# Patient Record
Sex: FEMALE | Race: WHITE | NOT HISPANIC OR LATINO | Employment: OTHER | URBAN - METROPOLITAN AREA
[De-identification: names, ages, dates, MRNs, and addresses within clinical notes are randomized per-mention and may not be internally consistent; named-entity substitution may affect disease eponyms.]

---

## 2017-01-11 ENCOUNTER — TELEPHONE (OUTPATIENT)
Dept: NEUROSURGERY | Facility: CLINIC | Age: 62
End: 2017-01-11

## 2017-01-11 DIAGNOSIS — Z98.1 STATUS POST LUMBAR SPINAL FUSION: Primary | ICD-10-CM

## 2017-01-12 RX ORDER — GABAPENTIN 300 MG/1
300 CAPSULE ORAL 3 TIMES DAILY
Qty: 90 CAPSULE | Refills: 3 | Status: SHIPPED | OUTPATIENT
Start: 2017-01-12 | End: 2017-07-13

## 2017-01-23 ENCOUNTER — HOSPITAL ENCOUNTER (OUTPATIENT)
Dept: GENERAL RADIOLOGY | Facility: CLINIC | Age: 62
Discharge: HOME | End: 2017-01-23
Attending: NURSE PRACTITIONER | Admitting: NURSE PRACTITIONER
Payer: COMMERCIAL

## 2017-01-23 DIAGNOSIS — Z98.1 STATUS POST LUMBAR SPINAL FUSION: ICD-10-CM

## 2017-01-23 PROCEDURE — 72100 X-RAY EXAM L-S SPINE 2/3 VWS: CPT

## 2017-01-30 ENCOUNTER — OFFICE VISIT (OUTPATIENT)
Dept: NEUROSURGERY | Facility: CLINIC | Age: 62
End: 2017-01-30
Attending: NURSE PRACTITIONER
Payer: COMMERCIAL

## 2017-01-30 VITALS
WEIGHT: 179 LBS | SYSTOLIC BLOOD PRESSURE: 142 MMHG | HEART RATE: 68 BPM | TEMPERATURE: 98 F | BODY MASS INDEX: 31.72 KG/M2 | DIASTOLIC BLOOD PRESSURE: 78 MMHG | OXYGEN SATURATION: 94 %

## 2017-01-30 DIAGNOSIS — Z98.1 STATUS POST LUMBAR SPINAL FUSION: Primary | ICD-10-CM

## 2017-01-30 PROCEDURE — 99214 OFFICE O/P EST MOD 30 MIN: CPT | Performed by: NURSE PRACTITIONER

## 2017-01-30 PROCEDURE — 99211 OFF/OP EST MAY X REQ PHY/QHP: CPT | Performed by: NURSE PRACTITIONER

## 2017-01-30 ASSESSMENT — PAIN SCALES - GENERAL: PAINLEVEL: MILD PAIN (3)

## 2017-01-30 NOTE — NURSING NOTE
"Mckayla Grady is a 61 year old female who presents for:  Chief Complaint   Patient presents with     Neurologic Problem     6 month follow upstatus post lumbar fusion DOS 8/4/16, still having the left leg numbing and stabbing in the ankle         Initial Vitals:  Wt 179 lb (81.194 kg)  LMP 05/21/2002 Estimated body mass index is 31.72 kg/(m^2) as calculated from the following:    Height as of 10/26/16: 5' 3\" (1.6 m).    Weight as of this encounter: 179 lb (81.194 kg).. There is no height on file to calculate BSA. BP completed using cuff size: large  Mild Pain (3)    Do you feel safe in your environment?  Yes  Do you need any refills today? No    Nursing Comments: 6 month follow upstatus post lumbar fusion DOS 8/4/16, still having the left leg numbing and stabbing in the ankle.  Patient rates her pain today as 3      5 min. nursing intake time  Licha Barros MA       Discharge plan: - Increase activity using pain as your guide  - followup in 6 months with xray prior   - Call the clinic at [phone] for increased pain or any other questions and concerns.  2 min. nursing discharge time  Licha Barros MA          "

## 2017-01-30 NOTE — MR AVS SNAPSHOT
"              After Visit Summary   1/30/2017    Mckayla Grady    MRN: [id number]           Patient Information     Date Of Birth          1955        Visit Information        Provider Department      1/30/2017 2:00 PM Carmen Cerda APRN CNP [facility]        Today's Diagnoses     Status post lumbar spinal fusion    -  1       Care Instructions    Plan:     - Increase activity using pain as your guide  - followup in 6 months with xray prior   - Call the clinic at [phone] for increased pain or any other questions and concerns.            Follow-ups after your visit        Future tests that were ordered for you today     Open Future Orders        Priority Expected Expires Ordered    XR Lumbar Spine 2/3 Views Routine 7/28/2017 1/30/2018 1/30/2017            Who to contact     If you have questions or need follow up information about today's clinic visit or your schedule please contact [facility] directly at [phone].  Normal or non-critical lab and imaging results will be communicated to you by [employer], letter or phone within 4 business days after the clinic has received the results. If you do not hear from us within 7 days, please contact the clinic through [employer] or phone. If you have a critical or abnormal lab result, we will notify you by phone as soon as possible.  Submit refill requests through [employer] or call your pharmacy and they will forward the refill request to us. Please allow 3 business days for your refill to be completed.          Additional Information About Your Visit        [employer] Information     [employer] lets you send messages to your doctor, view your test results, renew your prescriptions, schedule appointments and more. To sign up, go to [email or web address] . Click on \"Log in\" on the left side of the screen, which will take you to the Welcome page. Then click on \"Sign up Now\" on the right side of the page.     You will be asked " to enter the access code listed below, as well as some personal information. Please follow the directions to create your username and password.     Your access code is: [email or web address]  Expires: 2017  2:14 PM     Your access code will  in 90 days. If you need help or a new code, please call your [facility] clinic or [phone].        Care EveryWhere ID     This is your Care EveryWhere ID. This could be used by other organizations to access your [facility] medical records  [id number]        Your Vitals Were     Pulse Temperature Pulse Oximetry Last Period           98  F (36.7  C) (Oral) 94% 2002         Blood Pressure from Last 3 Encounters:   17 142/78   10/26/16 128/83   16 121/68    Weight from Last 3 Encounters:   17 179 lb (81.194 kg)   10/26/16 170 lb 3.2 oz (77.202 kg)   16 172 lb (78.019 kg)               Primary Care Provider Office Phone # Fax #    Lucille Garcia PA-C [phone]       [facility] [place] [address]  [place] [address]        Thank you!     Thank you for choosing [facility]  for your care. Our goal is always to provide you with excellent care. Hearing back from our patients is one way we can continue to improve our services. Please take a few minutes to complete the written survey that you may receive in the mail after your visit with us. Thank you!             Your Updated Medication List - Protect others around you: Learn how to safely use, store and throw away your medicines at www.disposemymeds.org.          This list is accurate as of: 17  2:14 PM.  Always use your most recent med list.                   Brand Name Dispense Instructions for use    gabapentin 300 MG capsule    NEURONTIN    90 capsule    Take 1 capsule (300 mg) by mouth 3 times daily       omeprazole 20 MG tablet    priLOSEC OTC     Take 20 mg by mouth as needed       oxyCODONE 5 MG IR tablet    ROXICODONE     60 tablet    Take 1-2 tablets (5-10 mg) by mouth every 4 hours as needed for moderate to severe pain       TRAZODONE HCL PO

## 2017-01-30 NOTE — PATIENT INSTRUCTIONS
Plan:     - Increase activity using pain as your guide  - followup in 6 months with xray prior   - Call the clinic at [phone] for increased pain or any other questions and concerns.

## 2017-01-30 NOTE — PROGRESS NOTES
[facility]  Neurosurgery followup:    HPI: Mckayla is a 62yo who is s/p L4-S1 decompression; TLIF  with Dr. Smith on 8/4/16 .  She is 6 months post op.  She continues to note some numbness to her left LE. Post surgery she had a lot of pain. She feels that when she does more activity the pain is worse.  She notes increased symptoms with walking more than 25 minutes makes it worse.  Overall, she is better. She was educated that this may or may not fully resolve and it is recommended that she return to PT.     Exam:  Constitutional:  Alert, well nourished, NAD.  HEENT: Normocephalic, atraumatic.   Pulm:  Without shortness of breath   CV:  No pitting edema of BLE.      Neurological:  Awake  Alert  Oriented x 3  Motor exam:        IP Q DF PF EHL  R   5  5   5   5    5  L   5  5   5   5    5     Able to spontaneously move L/E bilaterally  Sensation intact throughout all L/E dermatomes     Imaging: lumbar xray shows fusion intact  A/P: Mckayla is a 62yo who is s/p L4-S1 decompression; TLIF  with Dr. Smiht on 8/4/16 .  She is 6 months post op.  She continues to note some numbness to her left LE. Post surgery she had a lot of pain. She feels that when she does more activity the pain is worse.  She notes increased symptoms with walking more than 25 minutes makes it worse.  Overall, she is better. She was educated that this may or may not fully resolve and it is recommended that she return to PT.   She noted that when she ran out of the Neurontin she had increased pain. She takes 2 at bedtime. It was explained that it is ok to continue this.      Plan:     - Increase activity using pain as your guide  - followup in 6 months with xray prior   - Call the clinic at [phone] for increased pain or any other questions and concerns.      Carmen Cerda [facility]  [facility]  [address]  [address] [place] [address]    Tel [phone]  Pager [phone]

## 2017-05-19 ENCOUNTER — OFFICE VISIT (OUTPATIENT)
Dept: FAMILY MEDICINE | Facility: CLINIC | Age: 62
End: 2017-05-19
Payer: COMMERCIAL

## 2017-05-19 VITALS
OXYGEN SATURATION: 98 % | DIASTOLIC BLOOD PRESSURE: 84 MMHG | TEMPERATURE: 98.3 F | HEART RATE: 55 BPM | SYSTOLIC BLOOD PRESSURE: 155 MMHG | HEIGHT: 67 IN | WEIGHT: 179 LBS | BODY MASS INDEX: 28.09 KG/M2

## 2017-05-19 DIAGNOSIS — L91.8 SKIN TAG: ICD-10-CM

## 2017-05-19 DIAGNOSIS — Z11.59 NEED FOR HEPATITIS C SCREENING TEST: ICD-10-CM

## 2017-05-19 DIAGNOSIS — R03.0 ELEVATED BLOOD PRESSURE READING WITHOUT DIAGNOSIS OF HYPERTENSION: ICD-10-CM

## 2017-05-19 DIAGNOSIS — Z02.89 HEALTH EXAMINATION OF DEFINED SUBPOPULATION: Primary | ICD-10-CM

## 2017-05-19 DIAGNOSIS — Z12.11 COLON CANCER SCREENING: ICD-10-CM

## 2017-05-19 DIAGNOSIS — Z80.0 FAMILY HISTORY OF COLON CANCER: ICD-10-CM

## 2017-05-19 DIAGNOSIS — Z12.31 ENCOUNTER FOR SCREENING MAMMOGRAM FOR BREAST CANCER: ICD-10-CM

## 2017-05-19 DIAGNOSIS — K63.5 COLON POLYPS: ICD-10-CM

## 2017-05-19 PROCEDURE — 99213 OFFICE O/P EST LOW 20 MIN: CPT | Mod: 25 | Performed by: PHYSICIAN ASSISTANT

## 2017-05-19 PROCEDURE — 86762 RUBELLA ANTIBODY: CPT | Performed by: PHYSICIAN ASSISTANT

## 2017-05-19 PROCEDURE — 11200 RMVL SKIN TAGS UP TO&INC 15: CPT | Performed by: PHYSICIAN ASSISTANT

## 2017-05-19 PROCEDURE — 36415 COLL VENOUS BLD VENIPUNCTURE: CPT | Performed by: PHYSICIAN ASSISTANT

## 2017-05-19 PROCEDURE — 86765 RUBEOLA ANTIBODY: CPT | Performed by: PHYSICIAN ASSISTANT

## 2017-05-19 PROCEDURE — 86735 MUMPS ANTIBODY: CPT | Performed by: PHYSICIAN ASSISTANT

## 2017-05-19 PROCEDURE — 86803 HEPATITIS C AB TEST: CPT | Performed by: PHYSICIAN ASSISTANT

## 2017-05-19 PROCEDURE — 86787 VARICELLA-ZOSTER ANTIBODY: CPT | Performed by: PHYSICIAN ASSISTANT

## 2017-05-19 NOTE — LETTER
[address] [place] [address]   [phone]      May 22, 2017    Mckayla Grady  [address] [facility] [address]              Jos Block,    I have reviewed your recent labs. Here are the results:    -Hepatitis C antibody screen test shows no signs of a previous hepatitis C infection.    -Labs show you ARE immune to - Varicella, mumps, measles (rubeloa), rubella.     If you have any questions please do not hesitate to contact our office via phone ([phone]) or [employer]zzui by clicking the contact my Care Team link.    If you have further questions about the interpretation of your lab results, [email or web address] is a great website to check out.    Thank you for allowing me to participate in your care!    YESSI Lynn, PA-C  [facility]

## 2017-05-19 NOTE — PROGRESS NOTES
SUBJECTIVE:                                                    Mckayla Grady is a 61 year old female who presents to clinic today for the following health issues:      HPI:  1- Mckayla needs titers for varicella. Was told d/t her age she doesn't need MMR titers, but she would like this run. UTD on tetanus.   2- skin tag right arm pit, gets irritated, would like removed.    3- Due for mammo and colonoscopy.     -------------------------------------    Problem list and histories reviewed & adjusted, as indicated.  Additional history: as documented    Patient Active Problem List   Diagnosis     Leiomyoma of uterus     Calculus of kidney     Esophageal reflux     Family history of colon cancer     Colon polyps     CARDIOVASCULAR SCREENING; LDL GOAL LESS THAN 160     Advanced directives, counseling/discussion     Hip pain     DDD (degenerative disc disease), lumbar     Lumbar disc herniation     Lumbar foraminal stenosis     Central spinal stenosis     Overweight     S/P complete hysterectomy     Anxiety state     Dense breasts     Left breast mass     Lumbago     Aftercare following surgery of the musculoskeletal system     Elevated TSH     S/P lumbar fusion     Chronic bilateral low back pain with left-sided sciatica     Elevated blood pressure reading without diagnosis of hypertension     Past Surgical History:   Procedure Laterality Date     APPENDECTOMY       C EXPLORE/TREAT ANKLE JOINT  1989     C TOTAL ABDOM HYSTERECTOMY  2002    with bilat ovary removal     COLONOSCOPY  4/2009     COLONOSCOPY  4/2012    Repeat in 5 years     DISCECTOMY LUMBAR POSTERIOR MICROSCOPIC ONE LEVEL Right 1/8/2015    Procedure: DISCECTOMY LUMBAR POSTERIOR MICROSCOPIC ONE LEVEL;  Surgeon: Román Smith MD;  Location: [facility] OR     DISKECTOMY, LUMBAR, SINGLE SP  2001    X 2     DISKECTOMY, LUMBAR, SINGLE SP  2006      HC REVISE MEDIAN N/CARPAL TUNNEL SURG  2002     HYSTERECTOMY, PAP NO LONGER INDICATED       OPTICAL TRACKING SYSTEM  FUSION SPINE POSTERIOR LUMBAR TWO LEVELS N/A 8/4/2016    Procedure: OPTICAL TRACKING SYSTEM FUSION SPINE POSTERIOR LUMBAR TWO LEVELS;  Surgeon: Román Smith MD;  Location: [facility] OR     ORTHOPEDIC SURGERY  9/2015    GT TOE JOINT FUSION - BILATERAL     SURGICAL HISTORY OF -       C-secs x3     SURGICAL HISTORY OF -   1976    laparoscopy benign enlarged lymph node     SURGICAL HISTORY OF -   1963    appendectomy       Social History   Substance Use Topics     Smoking status: Never Smoker     Smokeless tobacco: Never Used     Alcohol use 0.0 oz/week     0 Standard drinks or equivalent per week      Comment: 6/week     Family History   Problem Relation Age of Onset     HEART DISEASE Father      CHF     Arthritis Father      Eye Disorder Father      glaucoma     C.A.D. Father      CABG     Genetic Disorder Father      Cancer - colorectal Mother      CEREBROVASCULAR DISEASE Maternal Grandmother      Eye Disorder Paternal Grandmother      glaucoma     DIABETES No family hx of      Hypertension No family hx of      Breast Cancer No family hx of      Alzheimer Disease No family hx of      Anesthesia Reaction No family hx of      Blood Disease No family hx of      Neurologic Disorder No family hx of      Thyroid Disease No family hx of      OSTEOPOROSIS No family hx of          Current Outpatient Prescriptions   Medication Sig Dispense Refill     TRAZODONE HCL PO Reported on 5/19/2017       gabapentin (NEURONTIN) 300 MG capsule Take 1 capsule (300 mg) by mouth 3 times daily 90 capsule 3     omeprazole (PRILOSEC OTC) 20 MG tablet Take 20 mg by mouth as needed        Allergies   Allergen Reactions     Ampicillin      GI upset     BP Readings from Last 3 Encounters:   05/19/17 155/84   01/30/17 142/78   10/26/16 128/83    Wt Readings from Last 3 Encounters:   05/19/17 179 lb (81.2 kg)   01/30/17 179 lb (81.2 kg)   10/26/16 170 lb 3.2 oz (77.2 kg)                  Labs reviewed in EPIC    Reviewed and updated as needed  "this visit by clinical staff       Reviewed and updated as needed this visit by Provider         Social History     Social History     Marital status:      Spouse name: Carlos     Number of children: 3     Years of education: 14     Occupational History      Other     Social History Main Topics     Smoking status: Never Smoker     Smokeless tobacco: Never Used     Alcohol use 0.0 oz/week     0 Standard drinks or equivalent per week      Comment: 6/week     Drug use: No     Sexual activity: Yes     Partners: Male     Other Topics Concern      Service No     Blood Transfusions No     Caffeine Concern No     Occupational Exposure No     Hobby Hazards No     Sleep Concern Yes     insomnia and interrupted sleep     Stress Concern Yes     personal     Weight Concern Yes     Special Diet No     Back Care No     Exercise No     Bike Helmet No     Seat Belt Yes     Self-Exams Yes     Social History Narrative    Healthy diet: eats fruits and vegetables every day. The patient takes calcium supplement.       10 point review of systems negative other than symptoms noted above.   Constitutional, HEENT, CV, pulmonary, GI, , MS, Endo, Psych systems are all negative, except as otherwise noted.       OBJECTIVE:  /84 (BP Location: Left arm, Cuff Size: Adult Regular)  Pulse 55  Temp 98.3  F (36.8  C) (Oral)  Ht 5' 7\" (1.702 m)  Wt 179 lb (81.2 kg)  LMP 05/21/2002  SpO2 98%  BMI 28.04 kg/m2  CONSTITUTIONAL: Alert, well-nourished, well-groomed, NAD  RESP: Lungs CTA. No wheeze, rhonchi, rales. Normal effort on room air. Equal lung sounds bilaterally.   CV: HRRR, normal S1, S2. No MRG. No peripheral edema.  DERM: Right armpit with one large fleshy skin tag.   PSYCH: Alert and oriented. Thought process is clear and goal-directed. Adequate insight and judgement. Mood - normal. Affect - normal.    PROCEDURE: Verbal consent obtained. Skin tag cleansed with alcohol prep. With cautery pen and " forceps, skin tag was removed without trouble. Patient tolerated well. Discussed home cares.     Diagnostic Tests:  PENDING-    ASSESSMENT/PLAN:  (Z02.89) Health examination of defined subpopulation  (primary encounter diagnosis)  Comment:   Plan: Varicella Zoster Virus Antibody IgG, Rubella         Antibody IgG Quantitative, Rubeola Antibody         IgG, Mumps Antibody IgG            (Z12.11) Colon cancer screening  Comment:   Plan: GASTROENTEROLOGY ADULT REF PROCEDURE ONLY            (Z80.0) Family history of colon cancer  Comment:   Plan: GASTROENTEROLOGY ADULT REF PROCEDURE ONLY            (K63.5) Colon polyps  Comment:   Plan: GASTROENTEROLOGY ADULT REF PROCEDURE ONLY            (Z12.31) Encounter for screening mammogram for breast cancer  Comment:   Plan: MA Screening Digital Bilateral            (Z11.59) Need for hepatitis C screening test  Comment:   Plan: Hepatitis C antibody            (L91.8) Skin tag  Comment:   Plan: REMOVAL OF SKIN TAGS, FIRST 15            (R03.0) Elevated blood pressure reading without diagnosis of hypertension  Comment:   Plan: Has been high last couple visits. High still on recheck. Advised LSMs and she can check at work. She has been walking a lot for exercise - limited on what she can do , d/t spinal fusion last yr. Will bike this summer. Diet can be improved, so she will work on that. Also, drinks a lot of coffee. She will come soon for px and we can recheck. If still high, we will start medication. She is in agreement with plan.       FOLLOW-UP: Soon for px.   The patient agrees with this assessment and plan and agrees to call or return to the clinic with any questions or concerns or if their condition worsens.    YESSI Sheikh, PA-C  [facility]

## 2017-05-19 NOTE — MR AVS SNAPSHOT
After Visit Summary   5/19/2017    Mckayla Grady    MRN: [id number]           Patient Information     Date Of Birth          1955        Visit Information        Provider Department      5/19/2017 3:40 PM Lucille Garcia PA-C [facility] [place]        Today's Diagnoses     Health examination of defined subpopulation    -  1    Colon cancer screening        Family history of colon cancer        Colon polyps        Encounter for screening mammogram for breast cancer        Need for hepatitis C screening test           Follow-ups after your visit        Additional Services     GASTROENTEROLOGY ADULT REF PROCEDURE ONLY       Last Lab Result: Creatinine (mg/dL)       Date                     Value                 07/12/2016               0.70             ----------  Body mass index is 28.04 kg/(m^2).      Patient will be contacted to schedule procedure.     Please be aware that coverage of these services is subject to the terms and limitations of your health insurance plan.  Call member services at your health plan with any benefit or coverage questions.  Any procedures must be performed at a [facility] facility OR coordinated by your clinic's referral office.    Please bring the following with you to your appointment:    (1) Any X-Rays, CTs or MRIs which have been performed.  Contact the facility where they were done to arrange for  prior to your scheduled appointment.    (2) List of current medications   (3) This referral request   (4) Any documents/labs given to you for this referral                  Future tests that were ordered for you today     Open Future Orders        Priority Expected Expires Ordered    MA Screening Digital Bilateral Routine  5/19/2018 5/19/2017            Who to contact     If you have questions or need follow up information about today's clinic visit or your schedule please contact [facility] [place] directly at [phone].  Normal or  "non-critical lab and imaging results will be communicated to you by MyChart, letter or phone within 4 business days after the clinic has received the results. If you do not hear from us within 7 days, please contact the clinic through [employer] or phone. If you have a critical or abnormal lab result, we will notify you by phone as soon as possible.  Submit refill requests through [employer] or call your pharmacy and they will forward the refill request to us. Please allow 3 business days for your refill to be completed.          Additional Information About Your Visit        [employer] Information     [employer] lets you send messages to your doctor, view your test results, renew your prescriptions, schedule appointments and more. To sign up, go to [email or web address] . Click on \"Log in\" on the left side of the screen, which will take you to the Welcome page. Then click on \"Sign up Now\" on the right side of the page.     You will be asked to enter the access code listed below, as well as some personal information. Please follow the directions to create your username and password.     Your access code is: [email or web address]  Expires: 2017  4:03 PM     Your access code will  in 90 days. If you need help or a new code, please call your [facility] clinic or [phone].        Care EveryWhere ID     This is your Care EveryWhere ID. This could be used by other organizations to access your [facility] medical records  [id number]        Your Vitals Were     Pulse Temperature Height Last Period Pulse Oximetry BMI (Body Mass Index)    55 98.3  F (36.8  C) (Oral) 5' 7\" (1.702 m) 2002 98% 28.04 kg/m2       Blood Pressure from Last 3 Encounters:   17 155/84   17 142/78   10/26/16 128/83    Weight from Last 3 Encounters:   17 179 lb (81.2 kg)   17 179 lb (81.2 kg)   10/26/16 170 lb 3.2 oz (77.2 kg)              We Performed the Following     GASTROENTEROLOGY ADULT REF PROCEDURE ONLY     Hepatitis C " antibody     Mumps Antibody IgG     Rubella Antibody IgG Quantitative     Rubeola Antibody IgG     Varicella Zoster Virus Antibody IgG        Primary Care Provider Office Phone # Fax #    Lucille Garcia PA-C [phone]       [facility] [place] [address] [place] [address] [address]  [place] [address]        Thank you!     Thank you for choosing [facility]  for your care. Our goal is always to provide you with excellent care. Hearing back from our patients is one way we can continue to improve our services. Please take a few minutes to complete the written survey that you may receive in the mail after your visit with us. Thank you!             Your Updated Medication List - Protect others around you: Learn how to safely use, store and throw away your medicines at www.disposemymeds.org.          This list is accurate as of: 5/19/17  4:03 PM.  Always use your most recent med list.                   Brand Name Dispense Instructions for use    gabapentin 300 MG capsule    NEURONTIN    90 capsule    Take 1 capsule (300 mg) by mouth 3 times daily       omeprazole 20 MG tablet    priLOSEC OTC     Take 20 mg by mouth as needed       TRAZODONE HCL PO      Reported on 5/19/2017

## 2017-05-22 LAB
HCV AB SERPL QL IA: NORMAL
MEV IGG SER QL IA: 3.6 AI (ref 0–0.8)
MUV IGG SER QL IA: 3.8 AI (ref 0–0.8)
RUBV IGG SERPL IA-ACNC: 52 IU/ML
VZV IGG SER QL IA: 3.4 AI (ref 0–0.8)

## 2017-05-22 ASSESSMENT — ANXIETY QUESTIONNAIRES
7. FEELING AFRAID AS IF SOMETHING AWFUL MIGHT HAPPEN: NOT AT ALL
6. BECOMING EASILY ANNOYED OR IRRITABLE: NOT AT ALL
IF YOU CHECKED OFF ANY PROBLEMS ON THIS QUESTIONNAIRE, HOW DIFFICULT HAVE THESE PROBLEMS MADE IT FOR YOU TO DO YOUR WORK, TAKE CARE OF THINGS AT HOME, OR GET ALONG WITH OTHER PEOPLE: NOT DIFFICULT AT ALL
GAD7 TOTAL SCORE: 2
2. NOT BEING ABLE TO STOP OR CONTROL WORRYING: NOT AT ALL
3. WORRYING TOO MUCH ABOUT DIFFERENT THINGS: SEVERAL DAYS
5. BEING SO RESTLESS THAT IT IS HARD TO SIT STILL: NOT AT ALL
1. FEELING NERVOUS, ANXIOUS, OR ON EDGE: NOT AT ALL

## 2017-05-22 ASSESSMENT — PATIENT HEALTH QUESTIONNAIRE - PHQ9: 5. POOR APPETITE OR OVEREATING: SEVERAL DAYS

## 2017-05-23 ASSESSMENT — ANXIETY QUESTIONNAIRES: GAD7 TOTAL SCORE: 2

## 2017-05-23 ASSESSMENT — PATIENT HEALTH QUESTIONNAIRE - PHQ9: SUM OF ALL RESPONSES TO PHQ QUESTIONS 1-9: 5

## 2017-07-01 ENCOUNTER — HEALTH MAINTENANCE LETTER (OUTPATIENT)
Age: 62
End: 2017-07-01

## 2017-07-13 DIAGNOSIS — Z98.1 STATUS POST LUMBAR SPINAL FUSION: ICD-10-CM

## 2017-07-13 RX ORDER — GABAPENTIN 300 MG/1
300 CAPSULE ORAL 3 TIMES DAILY
Qty: 90 CAPSULE | Refills: 3 | Status: SHIPPED | OUTPATIENT
Start: 2017-07-13 | End: 2018-01-12

## 2017-07-26 ENCOUNTER — HOSPITAL ENCOUNTER (OUTPATIENT)
Dept: MAMMOGRAPHY | Facility: CLINIC | Age: 62
Discharge: HOME | End: 2017-07-26
Attending: PHYSICIAN ASSISTANT | Admitting: PHYSICIAN ASSISTANT
Payer: COMMERCIAL

## 2017-07-26 DIAGNOSIS — Z12.31 ENCOUNTER FOR SCREENING MAMMOGRAM FOR BREAST CANCER: ICD-10-CM

## 2017-07-26 PROCEDURE — G0202 SCR MAMMO BI INCL CAD: HCPCS

## 2017-07-26 PROCEDURE — 77063 BREAST TOMOSYNTHESIS BI: CPT

## 2017-09-11 ENCOUNTER — TELEPHONE (OUTPATIENT)
Dept: FAMILY MEDICINE | Facility: CLINIC | Age: 62
End: 2017-09-11

## 2017-09-11 NOTE — TELEPHONE ENCOUNTER
Patient went to [facility] on Saturday.  She was Dx'd with Pneumonia. She was wheezing and she had crackles.   She is on an antibiotic, inhaler, steroids.  She is wondering if she should stay home from work.  She had a fever Friday night.  But fever gone now.  Ok to go back to work when she doesn't have a fever and she has been on antibiotics x 24 hours and is able to drink/eat and have energy.  There is some construction at work and dust and smells.  I said this may trigger coughing and she will contact her work about if she can sit somewhere dust free etc.   Call back prn  Berna Price RN- Triage [employer]

## 2017-11-06 ENCOUNTER — TRANSFERRED RECORDS (OUTPATIENT)
Dept: HEALTH INFORMATION MANAGEMENT | Facility: CLINIC | Age: 62
End: 2017-11-06

## 2017-11-14 ENCOUNTER — TELEPHONE (OUTPATIENT)
Dept: FAMILY MEDICINE | Facility: CLINIC | Age: 62
End: 2017-11-14

## 2017-11-14 NOTE — TELEPHONE ENCOUNTER
"11/14/17    Patient is due for a Colonoscopy screening.    Patient Communication Preferences indicate  Do not contact  and/or communication by \"Phone\" is not preferred. Call not required per Outreach team.    Carrol Jackson    "

## 2017-11-29 DIAGNOSIS — F51.04 PSYCHOPHYSIOLOGICAL INSOMNIA: ICD-10-CM

## 2017-11-29 DIAGNOSIS — F41.9 ANXIETY: ICD-10-CM

## 2017-11-29 RX ORDER — TRAZODONE HYDROCHLORIDE 50 MG/1
TABLET, FILM COATED ORAL
Qty: 90 TABLET | Refills: 0 | Status: SHIPPED | OUTPATIENT
Start: 2017-11-29 | End: 2018-02-21

## 2017-11-29 NOTE — TELEPHONE ENCOUNTER
Requested Prescriptions   Pending Prescriptions Disp Refills     traZODone (DESYREL) 50 MG tablet 90 tablet 0    Serotonin Modulators Failed    11/29/2017 11:03 AM       Failed - Recent or future visit with authorizing provider's specialty    Patient had office visit in the last year or has a visit in the next 30 days with authorizing provider.  See chart review.              Passed - Patient is age 18 or older       Passed - No active pregnancy on record       Passed - No positive pregnancy test in past 12 months

## 2017-11-29 NOTE — TELEPHONE ENCOUNTER
traZODone (DESYREL) 50 MG       Last Written Prescription Date: 8/21/17  Last Fill Quantity: 90; # refills: 0  Last Office Visit with [facility], [facility] or [facility] prescribing provider:  5/19/17        Last PHQ-9 score on record=   PHQ-9 SCORE 5/22/2017   Total Score -   Total Score 5       Lab Results   Component Value Date    AST 27 06/22/2011     Lab Results   Component Value Date    ALT 24 06/22/2011

## 2018-01-12 DIAGNOSIS — Z98.1 STATUS POST LUMBAR SPINAL FUSION: ICD-10-CM

## 2018-01-12 RX ORDER — GABAPENTIN 300 MG/1
300 CAPSULE ORAL 3 TIMES DAILY
Qty: 90 CAPSULE | Refills: 0 | Status: SHIPPED | OUTPATIENT
Start: 2018-01-12 | End: 2018-02-22

## 2018-02-21 DIAGNOSIS — F51.04 PSYCHOPHYSIOLOGICAL INSOMNIA: ICD-10-CM

## 2018-02-21 DIAGNOSIS — F41.9 ANXIETY: ICD-10-CM

## 2018-02-21 RX ORDER — TRAZODONE HYDROCHLORIDE 50 MG/1
TABLET, FILM COATED ORAL
Qty: 90 TABLET | Refills: 0 | Status: SHIPPED | OUTPATIENT
Start: 2018-02-21 | End: 2018-06-07

## 2018-02-21 NOTE — TELEPHONE ENCOUNTER
Prescription approved per [facility], [facility] or [facility] refill protocol.  Bharti Resendiz RN - Triage  [facility]

## 2018-02-21 NOTE — TELEPHONE ENCOUNTER
"Requested Prescriptions   Pending Prescriptions Disp Refills     traZODone (DESYREL) 50 MG tablet [Pharmacy Med Name: TRAZODONE 50MG      TAB]  Last Written Prescription Date:  11/29/17  Last Fill Quantity: 90 TABLET,  # refills: 0   Last office visit: 5/19/2017 with prescribing provider:  5/19/17   Future Office Visit:     90 tablet 0     Sig: TAKE 1-2 TABLETS BY MOUTH BEFORE BED FOR SLEEP AS NEEDED    Serotonin Modulators Passed    2/21/2018 12:06 PM       Passed - Recent or future visit with authorizing provider's specialty    Patient had office visit in the last year or has a visit in the next 30 days with authorizing provider.  See \"Patient Info\" tab in inbasket, or \"Choose Columns\" in Meds & Orders section of the refill encounter.            Passed - Patient is age 18 or older       Passed - No active pregnancy on record       Passed - No positive pregnancy test in past 12 months          "

## 2018-02-22 DIAGNOSIS — Z98.1 STATUS POST LUMBAR SPINAL FUSION: ICD-10-CM

## 2018-02-22 RX ORDER — GABAPENTIN 300 MG/1
300 CAPSULE ORAL 3 TIMES DAILY
Qty: 90 CAPSULE | Refills: 3 | Status: SHIPPED | OUTPATIENT
Start: 2018-02-22 | End: 2018-02-22

## 2018-02-22 RX ORDER — GABAPENTIN 300 MG/1
300 CAPSULE ORAL 3 TIMES DAILY
Qty: 90 CAPSULE | Refills: 3 | Status: SHIPPED | OUTPATIENT
Start: 2018-02-22 | End: 2018-09-11

## 2018-03-21 ENCOUNTER — OFFICE VISIT (OUTPATIENT)
Dept: FAMILY MEDICINE | Facility: CLINIC | Age: 63
End: 2018-03-21
Payer: COMMERCIAL

## 2018-03-21 VITALS
WEIGHT: 175 LBS | HEART RATE: 53 BPM | DIASTOLIC BLOOD PRESSURE: 80 MMHG | OXYGEN SATURATION: 97 % | BODY MASS INDEX: 27.41 KG/M2 | SYSTOLIC BLOOD PRESSURE: 130 MMHG | TEMPERATURE: 98.8 F

## 2018-03-21 DIAGNOSIS — Z01.818 PREOP GENERAL PHYSICAL EXAM: Primary | ICD-10-CM

## 2018-03-21 PROCEDURE — 99214 OFFICE O/P EST MOD 30 MIN: CPT | Performed by: INTERNAL MEDICINE

## 2018-03-21 NOTE — NURSING NOTE
"Chief Complaint   Patient presents with     Pre-Op Exam       Initial /80 (BP Location: Left arm, Patient Position: Chair, Cuff Size: Adult Regular)  Pulse 53  Temp 98.8  F (37.1  C) (Tympanic)  Wt 175 lb (79.4 kg)  LMP 05/21/2002  SpO2 97%  BMI 27.41 kg/m2 Estimated body mass index is 27.41 kg/(m^2) as calculated from the following:    Height as of 5/19/17: 5' 7\" (1.702 m).    Weight as of this encounter: 175 lb (79.4 kg).  Medication Reconciliation: complete  "

## 2018-03-21 NOTE — MR AVS SNAPSHOT
After Visit Summary   3/21/2018    Mckayla Grady    MRN: [id number]           Patient Information     Date Of Birth          1955        Visit Information        Provider Department      3/21/2018 4:40 PM Jennifer Vargas MD [facility]        Today's Diagnoses     Preop general physical exam    -  1      Care Instructions      Before Your Surgery      Call your surgeon if there is any change in your health. This includes signs of a cold or flu (such as a sore throat, runny nose, cough, rash or fever).    Do not smoke, drink alcohol or take over the counter medicine (unless your surgeon or primary care doctor tells you to) for the 24 hours before and after surgery.    If you take prescribed drugs: Follow your doctor s orders about which medicines to take and which to stop until after surgery.    Eating and drinking prior to surgery: follow the instructions from your surgeon    Take a shower or bath the night before surgery. Use the soap your surgeon gave you to gently clean your skin. If you do not have soap from your surgeon, use your regular soap. Do not shave or scrub the surgery site.  Wear clean pajamas and have clean sheets on your bed.           Follow-ups after your visit        Follow-up notes from your care team     Return if symptoms worsen or fail to improve.      Who to contact     If you have questions or need follow up information about today's clinic visit or your schedule please contact [facility] directly at [phone].  Normal or non-critical lab and imaging results will be communicated to you by MyChart, letter or phone within 4 business days after the clinic has received the results. If you do not hear from us within 7 days, please contact the clinic through MyChart or phone. If you have a critical or abnormal lab result, we will notify you by phone as soon as possible.  Submit refill requests through [employer] or call your pharmacy  and they will forward the refill request to us. Please allow 3 business days for your refill to be completed.          Additional Information About Your Visit        [employer] Information     [employer] gives you secure access to your electronic health record. If you see a primary care provider, you can also send messages to your care team and make appointments. If you have questions, please call your primary care clinic.  If you do not have a primary care provider, please call [phone] and they will assist you.        Care EveryWhere ID     This is your Care EveryWhere ID. This could be used by other organizations to access your [place] medical records  [id number]        Your Vitals Were     Pulse Temperature Last Period Pulse Oximetry BMI (Body Mass Index)       53 98.8  F (37.1  C) (Tympanic) 05/21/2002 97% 27.41 kg/m2        Blood Pressure from Last 3 Encounters:   03/21/18 130/80   05/19/17 155/84   01/30/17 142/78    Weight from Last 3 Encounters:   03/21/18 175 lb (79.4 kg)   05/19/17 179 lb (81.2 kg)   01/30/17 179 lb (81.2 kg)              Today, you had the following     No orders found for display         Today's Medication Changes          These changes are accurate as of 3/21/18  4:59 PM.  If you have any questions, ask your nurse or doctor.               These medicines have changed or have updated prescriptions.        Dose/Directions    gabapentin 300 MG capsule   Commonly known as:  NEURONTIN   This may have changed:    - how much to take  - when to take this   Used for:  Status post lumbar spinal fusion        Dose:  300 mg   Take 1 capsule (300 mg) by mouth 3 times daily   Quantity:  90 capsule   Refills:  3       traZODone 50 MG tablet   Commonly known as:  DESYREL   This may have changed:  Another medication with the same name was removed. Continue taking this medication, and follow the directions you see here.   Used for:  Anxiety, Psychophysiological insomnia   Changed by:  Jennifer Vargas  MD Yuliana        TAKE 1-2 TABLETS BY MOUTH BEFORE BED FOR SLEEP AS NEEDED   Quantity:  90 tablet   Refills:  0                Primary Care Provider Fax #    Physician No Ref-Primary [phone]       No address on file        Equal Access to Services     SHELLI CARYN : Christen kellie lee simone Bhakta, waaxda luqadaha, qaybta kaalmada adegiovannayaemleyn, [place] andujardennis lamar. So [facility] [phone].    ATENCIÓN: Si habla español, tiene a patterson disposición servicios gratuitos de asistencia lingüística. Llame al [phone].    We comply with applicable federal civil rights laws and [place] laws. We do not discriminate on the basis of race, color, national origin, age, disability, sex, sexual orientation, or gender identity.            Thank you!     Thank you for choosing [facility]  for your care. Our goal is always to provide you with excellent care. Hearing back from our patients is one way we can continue to improve our services. Please take a few minutes to complete the written survey that you may receive in the mail after your visit with us. Thank you!             Your Updated Medication List - Protect others around you: Learn how to safely use, store and throw away your medicines at www.disposemymeds.org.          This list is accurate as of 3/21/18  4:59 PM.  Always use your most recent med list.                   Brand Name Dispense Instructions for use Diagnosis    gabapentin 300 MG capsule    NEURONTIN    90 capsule    Take 1 capsule (300 mg) by mouth 3 times daily    Status post lumbar spinal fusion       omeprazole 20 MG tablet    priLOSEC OTC     Take 20 mg by mouth as needed        traZODone 50 MG tablet    DESYREL    90 tablet    TAKE 1-2 TABLETS BY MOUTH BEFORE BED FOR SLEEP AS NEEDED    Anxiety, Psychophysiological insomnia

## 2018-06-07 DIAGNOSIS — F41.9 ANXIETY: ICD-10-CM

## 2018-06-07 DIAGNOSIS — F51.04 PSYCHOPHYSIOLOGICAL INSOMNIA: ICD-10-CM

## 2018-06-07 RX ORDER — TRAZODONE HYDROCHLORIDE 50 MG/1
TABLET, FILM COATED ORAL
Qty: 90 TABLET | Refills: 2 | Status: SHIPPED | OUTPATIENT
Start: 2018-06-07 | End: 2019-03-08

## 2018-06-07 NOTE — TELEPHONE ENCOUNTER
"Requested Prescriptions   Pending Prescriptions Disp Refills     traZODone (DESYREL) 50 MG tablet [Pharmacy Med Name: TRAZODONE 50MG      TAB]  Last Written Prescription Date:  2/21/18  Last Fill Quantity: 90,  # refills: 0   Last office visit: 3/21/2018 with prescribing provider:  Alicia   Future Office Visit:     90 tablet 0     Sig: TAKE 1-2 TABLETS BY MOUTH BEFORE BED FOR SLEEP AS NEEDED    Serotonin Modulators Passed    6/7/2018  6:59 AM       Passed - Recent (12 mo) or future (30 days) visit within the authorizing provider's specialty    Patient had office visit in the last 12 months or has a visit in the next 30 days with authorizing provider or within the authorizing provider's specialty.  See \"Patient Info\" tab in inbasket, or \"Choose Columns\" in Meds & Orders section of the refill encounter.           Passed - Patient is age 18 or older       Passed - No active pregnancy on record       Passed - No positive pregnancy test in past 12 months          "

## 2018-06-25 ENCOUNTER — TELEPHONE (OUTPATIENT)
Dept: FAMILY MEDICINE | Facility: CLINIC | Age: 63
End: 2018-06-25

## 2018-06-25 DIAGNOSIS — J30.1 SEASONAL ALLERGIC RHINITIS DUE TO POLLEN, UNSPECIFIED CHRONICITY: Primary | ICD-10-CM

## 2018-06-25 RX ORDER — ALBUTEROL SULFATE 0.83 MG/ML
SOLUTION RESPIRATORY (INHALATION) EVERY 6 HOURS PRN
Qty: 25 VIAL | Refills: 0 | Status: CANCELLED | OUTPATIENT
Start: 2018-06-25

## 2018-06-25 NOTE — TELEPHONE ENCOUNTER
Reason for Call:  Medication or medication refill:    Do you use a [facility]?  Name of the pharmacy and phone number for the current request:      [id number]  [place] [facility] [place] [place] [facility] [address] [facility]             Name of the medication requested: ALBUTEROL SULFATE (2.5 MG/3ML) 0.083% IN NEBU    Other request: Patient went to [facility] and wanted a refill from us.    Can we leave a detailed message on this number? NO    Phone number patient can be reached at: Work number on file:  [phone] (work)    Best Time: anytime    Call taken on 6/25/2018 at 11:33 AM by Jade Olivarez

## 2018-06-25 NOTE — TELEPHONE ENCOUNTER
"Requested Prescriptions   Pending Prescriptions Disp Refills     albuterol (2.5 MG/3ML) 0.083% neb solution  Last Written Prescription Date:  ?? Don't see previous RX  Last Fill Quantity: ,  # refills:    Last office visit: 3/21/2018 with prescribing provider:  Alicia   Future Office Visit:     25 vial 0     Sig: Take by nebulization every 6 hours as needed for shortness of breath / dyspnea or wheezing    Asthma Maintenance Inhalers - Anticholinergics Failed    6/25/2018 11:35 AM       Failed - Recent (12 mo) or future (30 days) visit within the authorizing provider's specialty    Patient had office visit in the last 12 months or has a visit in the next 30 days with authorizing provider or within the authorizing provider's specialty.  See \"Patient Info\" tab in inbasket, or \"Choose Columns\" in Meds & Orders section of the refill encounter.           Passed - Patient is age 12 years or older          "

## 2018-06-26 RX ORDER — ALBUTEROL SULFATE 0.83 MG/ML
2.5 SOLUTION RESPIRATORY (INHALATION) EVERY 6 HOURS PRN
Qty: 25 VIAL | Refills: 1 | Status: SHIPPED | OUTPATIENT
Start: 2018-06-26 | End: 2019-07-19

## 2018-06-26 NOTE — TELEPHONE ENCOUNTER
"Pt received  this new ALBUTEROL SULFATE (2.5 MG/3ML) [facility]083[facility] and was wondering if we could refill it at [facility].  Called patient: [facility] - had wheezing/pneumonia last year and they \"might have said it was asthma induced\"   she has noticed that when the pollen counts get high or grass/trees she will wheeze  Advised that she really needs to be seen to establish care and to be evaluated for asthma  Patient states that she will just use her husbands inhaler if needed-states that she still has the albuterol neb solution but did not want to run out  Advised to schedule an appointment - refuses to make an appointment for this  Said to just forget about it and not send this to anyone.  Advised that I still need to send to a provider      Routing refill request to provider for review/approval because:  Medication is reported/historical    Анна JohnsonRN BSN  [facility]  [phone]              "

## 2018-06-26 NOTE — TELEPHONE ENCOUNTER
Patient notified with information noted below from provider and agrees with plan.  Bharti Resendiz RN - Triage  [facility]

## 2018-06-28 RX ORDER — ALBUTEROL SULFATE 90 UG/1
2 AEROSOL, METERED RESPIRATORY (INHALATION) EVERY 4 HOURS PRN
Qty: 1 INHALER | Refills: 3 | Status: SHIPPED | OUTPATIENT
Start: 2018-06-28 | End: 2019-07-05

## 2018-06-28 NOTE — TELEPHONE ENCOUNTER
[facility] EP states the pt does not have a nebulizer machine and she would like an order for the inhaler. Please advise or send in the rx to [facility] - CONNIE Garicas,

## 2018-09-07 ENCOUNTER — TELEPHONE (OUTPATIENT)
Dept: NEUROSURGERY | Facility: CLINIC | Age: 63
End: 2018-09-07

## 2018-09-07 DIAGNOSIS — Z98.1 STATUS POST LUMBAR SPINAL FUSION: ICD-10-CM

## 2018-09-07 NOTE — TELEPHONE ENCOUNTER
Refill on Gabapentin requested a refill through pharmacy. She does use the [facility] at [place]. DOS 08/04/16 s/p lumbar fusion

## 2018-09-10 NOTE — TELEPHONE ENCOUNTER
LVM requesting a call back to discuss how she is taking the gabapentin and if it is still effective.

## 2018-09-11 DIAGNOSIS — Z98.1 STATUS POST LUMBAR SPINAL FUSION: ICD-10-CM

## 2018-09-11 RX ORDER — GABAPENTIN 300 MG/1
600 CAPSULE ORAL AT BEDTIME
Qty: 60 CAPSULE | Refills: 3 | Status: SHIPPED | OUTPATIENT
Start: 2018-09-11 | End: 2019-01-15

## 2018-09-11 NOTE — PROGRESS NOTES
Spoke with patient she is taking 600 mg of gabapentin at night, she has been unable to tolerate taking it during the day. She does report that it does help with her LE N/T, Rx escribed to pharmacy.

## 2019-01-15 DIAGNOSIS — Z98.1 STATUS POST LUMBAR SPINAL FUSION: ICD-10-CM

## 2019-01-15 RX ORDER — GABAPENTIN 300 MG/1
600 CAPSULE ORAL AT BEDTIME
Qty: 60 CAPSULE | Refills: 3 | Status: SHIPPED | OUTPATIENT
Start: 2019-01-15 | End: 2019-07-05

## 2019-03-08 DIAGNOSIS — F41.9 ANXIETY: ICD-10-CM

## 2019-03-08 DIAGNOSIS — F51.04 PSYCHOPHYSIOLOGICAL INSOMNIA: ICD-10-CM

## 2019-03-08 RX ORDER — TRAZODONE HYDROCHLORIDE 50 MG/1
TABLET, FILM COATED ORAL
Qty: 90 TABLET | Refills: 0 | Status: SHIPPED | OUTPATIENT
Start: 2019-03-08 | End: 2019-07-05

## 2019-03-08 NOTE — TELEPHONE ENCOUNTER
"Requested Prescriptions   Pending Prescriptions Disp Refills     traZODone (DESYREL) 50 MG tablet [Pharmacy Med Name: TRAZODONE 50MG      TAB]  Last Written Prescription Date:  6/7/18  Last Fill Quantity: 90,  # refills: 2   Last office visit: 3/21/2018 with prescribing provider:  Alicia   Future Office Visit:     90 tablet 2     Sig: TAKE 1-2 TABLETS BY MOUTH BEFORE BED FOR SLEEP AS NEEDED    Serotonin Modulators Passed - 3/8/2019  7:03 AM       Passed - Recent (12 mo) or future (30 days) visit within the authorizing provider's specialty    Patient had office visit in the last 12 months or has a visit in the next 30 days with authorizing provider or within the authorizing provider's specialty.  See \"Patient Info\" tab in inbasket, or \"Choose Columns\" in Meds & Orders section of the refill encounter.             Passed - Medication is active on med list       Passed - Patient is age 18 or older       Passed - No active pregnancy on record       Passed - No positive pregnancy test in past 12 months          "

## 2019-03-08 NOTE — TELEPHONE ENCOUNTER
Medication is being filled for 1 time refill only due to:  due for px   Berna Price RN- Triage [employer]

## 2019-05-22 ENCOUNTER — TELEPHONE (OUTPATIENT)
Dept: NEUROSURGERY | Facility: CLINIC | Age: 64
End: 2019-05-22

## 2019-05-22 NOTE — TELEPHONE ENCOUNTER
Received a fax for a refill request on Neurontin 300 mg 2 tablets at bedtime # 60 prescribed by Carmen Cerda CNP.    LVM with patient requesting a call back to discuss. Per Amanda MONAHAN okay to fill one more time then would have patient get further refills through PCP. Will await return call to discuss above plan.

## 2019-07-05 ENCOUNTER — OFFICE VISIT (OUTPATIENT)
Dept: FAMILY MEDICINE | Facility: CLINIC | Age: 64
End: 2019-07-05
Payer: COMMERCIAL

## 2019-07-05 VITALS
OXYGEN SATURATION: 96 % | RESPIRATION RATE: 16 BRPM | TEMPERATURE: 97.7 F | HEART RATE: 70 BPM | HEIGHT: 63 IN | DIASTOLIC BLOOD PRESSURE: 76 MMHG | BODY MASS INDEX: 31.89 KG/M2 | SYSTOLIC BLOOD PRESSURE: 134 MMHG | WEIGHT: 180 LBS

## 2019-07-05 DIAGNOSIS — F51.04 PSYCHOPHYSIOLOGICAL INSOMNIA: ICD-10-CM

## 2019-07-05 DIAGNOSIS — R06.2 WHEEZING: ICD-10-CM

## 2019-07-05 DIAGNOSIS — Z98.1 STATUS POST LUMBAR SPINAL FUSION: ICD-10-CM

## 2019-07-05 DIAGNOSIS — L29.9 PRURITIC DISORDER: Primary | ICD-10-CM

## 2019-07-05 PROCEDURE — 99214 OFFICE O/P EST MOD 30 MIN: CPT | Performed by: INTERNAL MEDICINE

## 2019-07-05 RX ORDER — HYDROXYZINE HYDROCHLORIDE 25 MG/1
25-50 TABLET, FILM COATED ORAL 3 TIMES DAILY PRN
Qty: 60 TABLET | Refills: 3 | Status: SHIPPED | OUTPATIENT
Start: 2019-07-05 | End: 2019-08-26

## 2019-07-05 RX ORDER — GABAPENTIN 300 MG/1
600 CAPSULE ORAL AT BEDTIME
Qty: 180 CAPSULE | Refills: 3 | Status: SHIPPED | OUTPATIENT
Start: 2019-07-05 | End: 2019-08-26

## 2019-07-05 RX ORDER — TRAZODONE HYDROCHLORIDE 50 MG/1
TABLET, FILM COATED ORAL
Qty: 90 TABLET | Refills: 1 | Status: SHIPPED | OUTPATIENT
Start: 2019-07-05 | End: 2019-08-26

## 2019-07-05 RX ORDER — ALBUTEROL SULFATE 90 UG/1
2 AEROSOL, METERED RESPIRATORY (INHALATION) EVERY 4 HOURS PRN
Qty: 1 INHALER | Refills: 3 | Status: SHIPPED | OUTPATIENT
Start: 2019-07-05

## 2019-07-05 ASSESSMENT — MIFFLIN-ST. JEOR: SCORE: 1325.6

## 2019-07-05 NOTE — PROGRESS NOTES
"Subjective     Mckayla Grady is a 64 year old female who presents to clinic today for the following health issues:    HPI   Itchy Skin      Duration: 3 days    Description (location/character/radiation): palms itching after taking prednisone    Intensity:  severe    Accompanying signs and symptoms:     History (similar episodes/previous evaluation): None    Precipitating or alleviating factors: None    Therapies tried and outcome: None     Mckayla was seen at an urgent care last week for URI and persistent cough.  She was treated with a 5-day course of prednisone.  The past 2 days she developed itching on her palms, abdomen, and soles of her feet.  There has been no rash.  Itching is pretty intense, was keeping her up last night.  She recalls having some similar itching in her legs after she was treated with prednisone for back pain.        Medication Followup of trazodone, gabapentin, albuterol    Taking Medication as prescribed: yes    Side Effects:  None    Medication Helping Symptoms:  yes     Uses trazodone most every night to help her fall sleep.  Takes gabapentin which helps her sleep and helps control the pain from her lumbar disc disease.  Got a prescription a while ago for albuterol to use with a respiratory infection.  She has been using it with this current respiratory infection, needs a refill.          Reviewed and updated as needed this visit by Provider         Review of Systems   Const, HENT, pulm, gi, msk, skin reviewed,  otherwise negative unless noted above.        Objective    /76   Pulse 70   Temp 97.7  F (36.5  C) (Tympanic)   Resp 16   Ht 1.6 m (5' 3\")   Wt 81.6 kg (180 lb)   LMP 05/21/2002   SpO2 96%   BMI 31.89 kg/m    Body mass index is 31.89 kg/m .  Physical Exam   Gen: well appearing, pleasant woman, no distress  HEENT: PERRL, sclera nonicteric, MMM  Neck: supple, no LAD  Pulm: breathing comfortably, CTAB, no wheezes or rales  CV: RRR, normal S1 and S2, no murmurs  Skin: no " apparent rashes              Assessment & Plan     1. Pruritic disorder  Pruritis not a common or anticipated side effect from prednisone, but it sounds like she has maybe had this reaction before.  Recommended hydroxyzine as needed for bad itching, start daily cetirizine or Allegra.  If symptoms not improving over the next week or 2, consider evaluation for underlying disorders which might cause pruritus.  - hydrOXYzine (ATARAX) 25 MG tablet; Take 1-2 tablets (25-50 mg) by mouth 3 times daily as needed for itching  Dispense: 60 tablet; Refill: 3    2. Status post lumbar spinal fusion  Refill ordered.  This is helping her pain.  - gabapentin (NEURONTIN) 300 MG capsule; Take 2 capsules (600 mg) by mouth At Bedtime  Dispense: 180 capsule; Refill: 3    3. Psychophysiological insomnia  Refill ordered.  - traZODone (DESYREL) 50 MG tablet; TAKE 1-2 TABLETS BY MOUTH BEFORE BED FOR SLEEP AS NEEDED  Dispense: 90 tablet; Refill: 1    4. Wheezing  Refill ordered.  She is still using this for her current illness.  The cough has improved after the prednisone.  She has no wheezing on exam.  - albuterol (PROAIR HFA/PROVENTIL HFA/VENTOLIN HFA) 108 (90 Base) MCG/ACT inhaler; Inhale 2 puffs into the lungs every 4 hours as needed for shortness of breath / dyspnea or wheezing  Dispense: 1 Inhaler; Refill: 3           Return in about 2 months (around 9/5/2019) for Physical Exam.    Jennifer Vargas MD  [facility]

## 2019-07-10 ENCOUNTER — OFFICE VISIT (OUTPATIENT)
Dept: FAMILY MEDICINE | Facility: CLINIC | Age: 64
End: 2019-07-10
Payer: COMMERCIAL

## 2019-07-10 VITALS
HEART RATE: 71 BPM | SYSTOLIC BLOOD PRESSURE: 130 MMHG | DIASTOLIC BLOOD PRESSURE: 72 MMHG | OXYGEN SATURATION: 97 % | TEMPERATURE: 98.6 F | BODY MASS INDEX: 31.18 KG/M2 | WEIGHT: 176 LBS

## 2019-07-10 DIAGNOSIS — R19.7 DIARRHEA OF PRESUMED INFECTIOUS ORIGIN: ICD-10-CM

## 2019-07-10 DIAGNOSIS — G47.01 INSOMNIA DUE TO MEDICAL CONDITION: Primary | ICD-10-CM

## 2019-07-10 DIAGNOSIS — L29.9 PRURITIC DISORDER: ICD-10-CM

## 2019-07-10 LAB
ALBUMIN UR-MCNC: 30 MG/DL
AMORPH CRY #/AREA URNS HPF: ABNORMAL /HPF
APPEARANCE UR: CLEAR
BACTERIA #/AREA URNS HPF: ABNORMAL /HPF
BASOPHILS # BLD AUTO: 0 10E9/L (ref 0–0.2)
BASOPHILS NFR BLD AUTO: 0.4 %
BILIRUB UR QL STRIP: ABNORMAL
CAOX CRY #/AREA URNS HPF: ABNORMAL /HPF
COLOR UR AUTO: ABNORMAL
DIFFERENTIAL METHOD BLD: ABNORMAL
EOSINOPHIL # BLD AUTO: 0.1 10E9/L (ref 0–0.7)
EOSINOPHIL NFR BLD AUTO: 1.5 %
ERYTHROCYTE [DISTWIDTH] IN BLOOD BY AUTOMATED COUNT: 12.7 % (ref 10–15)
ERYTHROCYTE [SEDIMENTATION RATE] IN BLOOD BY WESTERGREN METHOD: 16 MM/H (ref 0–30)
GLUCOSE UR STRIP-MCNC: NEGATIVE MG/DL
HCT VFR BLD AUTO: 39.5 % (ref 35–47)
HGB BLD-MCNC: 13.2 G/DL (ref 11.7–15.7)
HGB UR QL STRIP: ABNORMAL
KETONES UR STRIP-MCNC: NEGATIVE MG/DL
LEUKOCYTE ESTERASE UR QL STRIP: NEGATIVE
LYMPHOCYTES # BLD AUTO: 1.6 10E9/L (ref 0.8–5.3)
LYMPHOCYTES NFR BLD AUTO: 23.8 %
MCH RBC QN AUTO: 33.2 PG (ref 26.5–33)
MCHC RBC AUTO-ENTMCNC: 33.4 G/DL (ref 31.5–36.5)
MCV RBC AUTO: 99 FL (ref 78–100)
MONOCYTES # BLD AUTO: 1.2 10E9/L (ref 0–1.3)
MONOCYTES NFR BLD AUTO: 17.1 %
MUCOUS THREADS #/AREA URNS LPF: PRESENT /LPF
NEUTROPHILS # BLD AUTO: 3.9 10E9/L (ref 1.6–8.3)
NEUTROPHILS NFR BLD AUTO: 57.2 %
NITRATE UR QL: NEGATIVE
NON-SQ EPI CELLS #/AREA URNS LPF: ABNORMAL /LPF
PH UR STRIP: 5.5 PH (ref 5–7)
PLATELET # BLD AUTO: 257 10E9/L (ref 150–450)
RBC # BLD AUTO: 3.98 10E12/L (ref 3.8–5.2)
RBC #/AREA URNS AUTO: ABNORMAL /HPF
SOURCE: ABNORMAL
SP GR UR STRIP: 1.02 (ref 1–1.03)
UROBILINOGEN UR STRIP-ACNC: 0.2 EU/DL (ref 0.2–1)
WBC # BLD AUTO: 6.9 10E9/L (ref 4–11)
WBC #/AREA URNS AUTO: ABNORMAL /HPF

## 2019-07-10 PROCEDURE — 84443 ASSAY THYROID STIM HORMONE: CPT | Performed by: NURSE PRACTITIONER

## 2019-07-10 PROCEDURE — 81001 URINALYSIS AUTO W/SCOPE: CPT | Performed by: NURSE PRACTITIONER

## 2019-07-10 PROCEDURE — 36415 COLL VENOUS BLD VENIPUNCTURE: CPT | Performed by: NURSE PRACTITIONER

## 2019-07-10 PROCEDURE — 80053 COMPREHEN METABOLIC PANEL: CPT | Performed by: NURSE PRACTITIONER

## 2019-07-10 PROCEDURE — 85025 COMPLETE CBC W/AUTO DIFF WBC: CPT | Performed by: NURSE PRACTITIONER

## 2019-07-10 PROCEDURE — 99214 OFFICE O/P EST MOD 30 MIN: CPT | Performed by: NURSE PRACTITIONER

## 2019-07-10 PROCEDURE — 85652 RBC SED RATE AUTOMATED: CPT | Performed by: NURSE PRACTITIONER

## 2019-07-10 RX ORDER — LORAZEPAM 1 MG/1
1 TABLET ORAL EVERY 6 HOURS PRN
Qty: 20 TABLET | Refills: 0 | Status: ON HOLD | OUTPATIENT
Start: 2019-07-10 | End: 2019-07-24

## 2019-07-10 NOTE — PATIENT INSTRUCTIONS
Try Atarax .    Keep going with Claritin    Use lorazepam sparingly for sleep    I'll follow up with any leads based on our labs today    Keep your skin moisturized well    Could try capsaicin on problematic area

## 2019-07-10 NOTE — PROGRESS NOTES
Subjective     Mckayla Grady is a 64 year old female who presents to clinic today for the following health issues:    HPI   Concern - Pt is here to f/u from her OV on 7/5. States that her sx's have not  Resolved at all. Is not sleeping. Diarrhea is lasting, notes it started after she completed the Prednisone ( a week ago). Has tried Atarax, claritin, benadryl, nyquil, ice pack, trazadone, immodium.     HPI: Mckayla presents today with the complaint of whole body pruritis (without rash / hives) and diarrhea. She notes that her diarrhea has been ongoing for about three weeks. She notes that it is quite watery, and she continues to have frequent BMs throughout the day (especially after meals). Pruritis seems to have started after taking a course of prednisone (prescribed for URI on 6/28). She reports that she has developed itch after prednisone therapy in the past, as well. She states that she is unable to sleep due to the incessant itching. She was seen here last week and prescribed Atarax. She was also asked to use Claritin or Allegra in addition to Atarax. She notes that she has experienced no improvement. The itchiness is present over her hands (dorsum and palms), arms, back, abdomen, belt line, legs, and feet. No face or scalp involvement. Denies fever, chills, night sweats, unintentional weight loss. Denies palpable swollen lymph node(s). Has not had labs drawn in quite some time. No known kidney, liver, or endocrine disorders. Family history of colon cancer.     Patient Active Problem List   Diagnosis     Calculus of kidney     Esophageal reflux     Family history of colon cancer     Colon polyps     CARDIOVASCULAR SCREENING; LDL GOAL LESS THAN 160     Hip pain     DDD (degenerative disc disease), lumbar     Lumbar disc herniation     Lumbar foraminal stenosis     Central spinal stenosis     Overweight     S/P complete hysterectomy     Anxiety state     Dense breasts     Left breast mass     Elevated TSH     S/P  lumbar fusion     Chronic bilateral low back pain with left-sided sciatica     Elevated blood pressure reading without diagnosis of hypertension     Past Surgical History:   Procedure Laterality Date     APPENDECTOMY       C EXPLORE/TREAT ANKLE JOINT  1989     C TOTAL ABDOM HYSTERECTOMY  2002    with bilat ovary removal     COLONOSCOPY  4/2009     COLONOSCOPY  4/2012    Repeat in 5 years     DISCECTOMY LUMBAR POSTERIOR MICROSCOPIC ONE LEVEL Right 1/8/2015    Procedure: DISCECTOMY LUMBAR POSTERIOR MICROSCOPIC ONE LEVEL;  Surgeon: Román Smith MD;  Location: [facility]     DISKECTOMY, LUMBAR, SINGLE SP  2001    X 2     DISKECTOMY, LUMBAR, SINGLE SP  2006      HC REVISE MEDIAN N/CARPAL TUNNEL SURG  2002     HYSTERECTOMY, PAP NO LONGER INDICATED       OPTICAL TRACKING SYSTEM FUSION SPINE POSTERIOR LUMBAR TWO LEVELS N/A 8/4/2016    Procedure: OPTICAL TRACKING SYSTEM FUSION SPINE POSTERIOR LUMBAR TWO LEVELS;  Surgeon: Román Smith MD;  Location:  OR     ORTHOPEDIC SURGERY  9/2015    GT TOE JOINT FUSION - BILATERAL     SURGICAL HISTORY OF -       C-secs x3     SURGICAL HISTORY OF -   1976    laparoscopy benign enlarged lymph node     SURGICAL HISTORY OF -   1963    appendectomy       Social History     Tobacco Use     Smoking status: Never Smoker     Smokeless tobacco: Never Used   Substance Use Topics     Alcohol use: Yes     Alcohol/week: 0.0 oz     Comment: 6/week     Family History   Problem Relation Age of Onset     Heart Disease Father         CHF     Arthritis Father      Eye Disorder Father         glaucoma     C.A.D. Father         CABG     Genetic Disorder Father      Cancer - colorectal Mother      Cerebrovascular Disease Maternal Grandmother      Eye Disorder Paternal Grandmother         glaucoma     Diabetes No family hx of      Hypertension No family hx of      Breast Cancer No family hx of      Alzheimer Disease No family hx of      Anesthesia Reaction No family hx of      Blood  Disease No family hx of      Neurologic Disorder No family hx of      Thyroid Disease No family hx of      Osteoporosis No family hx of            Reviewed and updated as needed this visit by Provider  Tobacco  Allergies  Meds  Problems  Med Hx  Surg Hx  Fam Hx         Review of Systems   ROS COMP: Constitutional, HEENT, GI, musculoskeletal, neuro, skin, endocrine and psych systems are negative, except as otherwise noted.      Objective    /72 (BP Location: Right arm, Patient Position: Chair, Cuff Size: Adult Regular)   Pulse 71   Temp 98.6  F (37  C) (Tympanic)   Wt 79.8 kg (176 lb)   LMP 05/21/2002   SpO2 97%   BMI 31.18 kg/m    Body mass index is 31.18 kg/m .  Physical Exam   GENERAL: healthy, alert and no distress  RESP: lungs clear to auscultation - no rales, rhonchi or wheezes  CV: regular rate and rhythm, normal S1 S2, no S3 or S4, no murmur, click or rub, no peripheral edema and peripheral pulses strong  SKIN: Excoriative stigmata present over areas of complaint, but skin rash / lesions not evident.   NEURO: Normal strength and tone, mentation intact and speech normal    Diagnostic Test Results:  Results for orders placed or performed in visit on 07/10/19 (from the past 24 hour(s))   CBC with platelets and differential   Result Value Ref Range    WBC 6.9 4.0 - 11.0 10e9/L    RBC Count 3.98 3.8 - 5.2 10e12/L    Hemoglobin 13.2 11.7 - 15.7 g/dL    Hematocrit 39.5 35.0 - 47.0 %    MCV 99 78 - 100 fl    MCH 33.2 (H) 26.5 - 33.0 pg    MCHC 33.4 31.5 - 36.5 g/dL    RDW 12.7 10.0 - 15.0 %    Platelet Count 257 150 - 450 10e9/L    % Neutrophils 57.2 %    % Lymphocytes 23.8 %    % Monocytes 17.1 %    % Eosinophils 1.5 %    % Basophils 0.4 %    Absolute Neutrophil 3.9 1.6 - 8.3 10e9/L    Absolute Lymphocytes 1.6 0.8 - 5.3 10e9/L    Absolute Monocytes 1.2 0.0 - 1.3 10e9/L    Absolute Eosinophils 0.1 0.0 - 0.7 10e9/L    Absolute Basophils 0.0 0.0 - 0.2 10e9/L    Diff Method Automated Method    ESR:  Erythrocyte sedimentation rate   Result Value Ref Range    Sed Rate 16 0 - 30 mm/h   *UA reflex to Microscopic   Result Value Ref Range    Color Urine Brown     Appearance Urine Clear     Glucose Urine Negative NEG^Negative mg/dL    Bilirubin Urine Large (A) NEG^Negative    Ketones Urine Negative NEG^Negative mg/dL    Specific Gravity Urine 1.025 1.003 - 1.035    Blood Urine Small (A) NEG^Negative    pH Urine 5.5 5.0 - 7.0 pH    Protein Albumin Urine 30 (A) NEG^Negative mg/dL    Urobilinogen Urine 0.2 0.2 - 1.0 EU/dL    Nitrite Urine Negative NEG^Negative    Leukocyte Esterase Urine Negative NEG^Negative    Source Midstream Urine    Urine Microscopic   Result Value Ref Range    WBC Urine 5-10 (A) OTO5^0 - 5 /HPF    RBC Urine 2-5 (A) OTO2^O - 2 /HPF    Squamous Epithelial /LPF Urine Many (A) FEW^Few /LPF    Bacteria Urine Few (A) NEG^Negative /HPF    Amorphous Crystals Many (A) NEG^Negative /HPF    Calcium Oxalate Many (A) NEG^Negative /HPF    Mucous Urine Present (A) NEG^Negative /LPF           Assessment & Plan     Mckayla was seen today for recheck. Concerning that there is no obvious etiology for her symptoms (both diarrhea and itch). No apparent primary skin issue. The differential for her pruritic disorder at this point is broad and includes: kidney disease, liver disease, lymphoma, thyroid dysfunction, polycythemia, diabetes, carcinoid syndrome (especially given diarrhea), autoimmune disorder, and MS. Will start with CMP, UA, TSH, ESR, and CBC. Ideally, one or more of these will help guide further workup. Will also check stool for Cryptosporidium / Cyclospora, as this has become prevalent in the community recently. In the meantime, while we wait for results of these studies, I will order lorazepam to help her sleep (this has worked well for her in the past). Suggest continuing Atarax (use  mg) and Claritin. Also recommended capsaicin and attention to good skin moisturizing practices. Discussed reasons to  call or return to clinic. Mckayla acknowledges and demonstrates understanding of circumstances under which care should be sought urgently or emergently. Follow up as discussed. Discussed risks, benefits, alternatives, potential side effects, and proper administration of new medication / treatment. Agrees with plan of care. All questions answered.     Diagnoses and all orders for this visit:    Insomnia due to medical condition  -     LORazepam (ATIVAN) 1 MG tablet; Take 1 tablet (1 mg) by mouth every 6 hours as needed for anxiety  -     Urine Microscopic    Pruritic disorder  -     Comprehensive metabolic panel  -     TSH with free T4 reflex  -     *UA reflex to Microscopic  -     CBC with platelets and differential  -     LORazepam (ATIVAN) 1 MG tablet; Take 1 tablet (1 mg) by mouth every 6 hours as needed for anxiety  -     ESR: Erythrocyte sedimentation rate    Diarrhea of presumed infectious origin  -     Cryptosporidium in stool, stain; Future         See Patient Instructions    Return in about 3 days (around 7/13/2019) for persistent or worsening symptoms.    Gael Jacobson, JAYJAY  [facility] [place]

## 2019-07-11 ENCOUNTER — TELEPHONE (OUTPATIENT)
Dept: FAMILY MEDICINE | Facility: CLINIC | Age: 64
End: 2019-07-11

## 2019-07-11 DIAGNOSIS — R79.89 ELEVATED LFTS: Primary | ICD-10-CM

## 2019-07-11 LAB
ALBUMIN SERPL-MCNC: 3.8 G/DL (ref 3.4–5)
ALP SERPL-CCNC: 328 U/L (ref 40–150)
ALT SERPL W P-5'-P-CCNC: 214 U/L (ref 0–50)
ANION GAP SERPL CALCULATED.3IONS-SCNC: 9 MMOL/L (ref 3–14)
AST SERPL W P-5'-P-CCNC: 54 U/L (ref 0–45)
BILIRUB SERPL-MCNC: 4.6 MG/DL (ref 0.2–1.3)
BUN SERPL-MCNC: 10 MG/DL (ref 7–30)
CALCIUM SERPL-MCNC: 9 MG/DL (ref 8.5–10.1)
CHLORIDE SERPL-SCNC: 110 MMOL/L (ref 94–109)
CO2 SERPL-SCNC: 22 MMOL/L (ref 20–32)
CREAT SERPL-MCNC: 0.63 MG/DL (ref 0.52–1.04)
GFR SERPL CREATININE-BSD FRML MDRD: >90 ML/MIN/{1.73_M2}
GLUCOSE SERPL-MCNC: 159 MG/DL (ref 70–99)
POTASSIUM SERPL-SCNC: 3.8 MMOL/L (ref 3.4–5.3)
PROT SERPL-MCNC: 7.1 G/DL (ref 6.8–8.8)
SODIUM SERPL-SCNC: 141 MMOL/L (ref 133–144)
TSH SERPL DL<=0.005 MIU/L-ACNC: 1.85 MU/L (ref 0.4–4)

## 2019-07-17 ENCOUNTER — HOSPITAL ENCOUNTER (OUTPATIENT)
Dept: ULTRASOUND IMAGING | Facility: CLINIC | Age: 64
Discharge: HOME | End: 2019-07-17
Attending: NURSE PRACTITIONER | Admitting: NURSE PRACTITIONER
Payer: COMMERCIAL

## 2019-07-17 ENCOUNTER — TELEPHONE (OUTPATIENT)
Dept: FAMILY MEDICINE | Facility: CLINIC | Age: 64
End: 2019-07-17

## 2019-07-17 DIAGNOSIS — R79.89 ELEVATED LFTS: ICD-10-CM

## 2019-07-17 DIAGNOSIS — L29.9 PRURITIC DISORDER: ICD-10-CM

## 2019-07-17 DIAGNOSIS — R79.89 ELEVATED LFTS: Primary | ICD-10-CM

## 2019-07-17 DIAGNOSIS — K83.8 DILATED BILE DUCT: ICD-10-CM

## 2019-07-17 PROCEDURE — 76705 ECHO EXAM OF ABDOMEN: CPT

## 2019-07-17 NOTE — TELEPHONE ENCOUNTER
Called patient and related results of ultrasound study.    Ordered MRCP (recommended by reading radiologist).    Will call and schedule this imminently.     Gael Jacobson NP on 7/17/2019 at 11:35 AM

## 2019-07-18 ENCOUNTER — TELEPHONE (OUTPATIENT)
Dept: FAMILY MEDICINE | Facility: CLINIC | Age: 64
End: 2019-07-18

## 2019-07-18 NOTE — TELEPHONE ENCOUNTER
Reason for Call:   call back    Detailed comments:  Tomorrow PT is having a MRCP @ 7 am . She is wondering why she is not having the ERCP done.  Still in lots of pain.   Phone Number Patient can be reached at: Work number on file:  [phone] (work)    Best Time: any    Can we leave a detailed message on this number?   Yes    Call taken on 7/18/2019 at 11:12 AM by Loly Correa

## 2019-07-19 ENCOUNTER — TELEPHONE (OUTPATIENT)
Dept: FAMILY MEDICINE | Facility: CLINIC | Age: 64
End: 2019-07-19

## 2019-07-19 ENCOUNTER — APPOINTMENT (OUTPATIENT)
Dept: GENERAL RADIOLOGY | Facility: CLINIC | Age: 64
DRG: 445 | End: 2019-07-19
Attending: EMERGENCY MEDICINE
Payer: COMMERCIAL

## 2019-07-19 ENCOUNTER — HOSPITAL ENCOUNTER (OUTPATIENT)
Dept: MRI IMAGING | Facility: CLINIC | Age: 64
Discharge: HOME | DRG: 445 | End: 2019-07-19
Attending: NURSE PRACTITIONER | Admitting: NURSE PRACTITIONER
Payer: COMMERCIAL

## 2019-07-19 ENCOUNTER — HOSPITAL ENCOUNTER (INPATIENT)
Facility: CLINIC | Age: 64
LOS: 2 days | Discharge: HOME | DRG: 445 | End: 2019-07-21
Attending: EMERGENCY MEDICINE | Admitting: HOSPITALIST
Payer: COMMERCIAL

## 2019-07-19 DIAGNOSIS — L29.9 PRURITIC DISORDER: ICD-10-CM

## 2019-07-19 DIAGNOSIS — K80.50 CHOLEDOCHOLITHIASIS: Primary | ICD-10-CM

## 2019-07-19 DIAGNOSIS — K80.50 CHOLEDOCHOLITHIASIS: ICD-10-CM

## 2019-07-19 DIAGNOSIS — K83.8 DILATED BILE DUCT: ICD-10-CM

## 2019-07-19 DIAGNOSIS — R79.89 ELEVATED LFTS: ICD-10-CM

## 2019-07-19 LAB
ALBUMIN SERPL-MCNC: 3.6 G/DL (ref 3.4–5)
ALP SERPL-CCNC: 382 U/L (ref 40–150)
ALT SERPL W P-5'-P-CCNC: 64 U/L (ref 0–50)
ANION GAP SERPL CALCULATED.3IONS-SCNC: 5 MMOL/L (ref 3–14)
AST SERPL W P-5'-P-CCNC: 37 U/L (ref 0–45)
BASOPHILS # BLD AUTO: 0 10E9/L (ref 0–0.2)
BASOPHILS NFR BLD AUTO: 0.5 %
BILIRUB SERPL-MCNC: 6.2 MG/DL (ref 0.2–1.3)
BUN SERPL-MCNC: 19 MG/DL (ref 7–30)
CALCIUM SERPL-MCNC: 9.5 MG/DL (ref 8.5–10.1)
CHLORIDE SERPL-SCNC: 109 MMOL/L (ref 94–109)
CO2 SERPL-SCNC: 27 MMOL/L (ref 20–32)
CREAT SERPL-MCNC: 0.71 MG/DL (ref 0.52–1.04)
DIFFERENTIAL METHOD BLD: ABNORMAL
EOSINOPHIL # BLD AUTO: 0.1 10E9/L (ref 0–0.7)
EOSINOPHIL NFR BLD AUTO: 1.2 %
ERYTHROCYTE [DISTWIDTH] IN BLOOD BY AUTOMATED COUNT: 13.4 % (ref 10–15)
GFR SERPL CREATININE-BSD FRML MDRD: 89 ML/MIN/{1.73_M2}
GLUCOSE SERPL-MCNC: 126 MG/DL (ref 70–99)
HCT VFR BLD AUTO: 37.1 % (ref 35–47)
HGB BLD-MCNC: 12.8 G/DL (ref 11.7–15.7)
IMM GRANULOCYTES # BLD: 0 10E9/L (ref 0–0.4)
IMM GRANULOCYTES NFR BLD: 0.3 %
LIPASE SERPL-CCNC: 506 U/L (ref 73–393)
LYMPHOCYTES # BLD AUTO: 1.8 10E9/L (ref 0.8–5.3)
LYMPHOCYTES NFR BLD AUTO: 25 %
MCH RBC QN AUTO: 33.2 PG (ref 26.5–33)
MCHC RBC AUTO-ENTMCNC: 34.5 G/DL (ref 31.5–36.5)
MCV RBC AUTO: 96 FL (ref 78–100)
MONOCYTES # BLD AUTO: 1 10E9/L (ref 0–1.3)
MONOCYTES NFR BLD AUTO: 13.7 %
NEUTROPHILS # BLD AUTO: 4.4 10E9/L (ref 1.6–8.3)
NEUTROPHILS NFR BLD AUTO: 59.3 %
NRBC # BLD AUTO: 0 10*3/UL
NRBC BLD AUTO-RTO: 0 /100
PLATELET # BLD AUTO: 253 10E9/L (ref 150–450)
POTASSIUM SERPL-SCNC: 3.8 MMOL/L (ref 3.4–5.3)
PROT SERPL-MCNC: 7.5 G/DL (ref 6.8–8.8)
RBC # BLD AUTO: 3.85 10E12/L (ref 3.8–5.2)
SODIUM SERPL-SCNC: 141 MMOL/L (ref 133–144)
WBC # BLD AUTO: 7.4 10E9/L (ref 4–11)

## 2019-07-19 PROCEDURE — 99285 EMERGENCY DEPT VISIT HI MDM: CPT | Mod: 25

## 2019-07-19 PROCEDURE — 96374 THER/PROPH/DIAG INJ IV PUSH: CPT

## 2019-07-19 PROCEDURE — 25000132 ZZH RX MED GY IP 250 OP 250 PS 637: Performed by: HOSPITALIST

## 2019-07-19 PROCEDURE — 25500064 ZZH RX 255 OP 636: Performed by: NURSE PRACTITIONER

## 2019-07-19 PROCEDURE — 85025 COMPLETE CBC W/AUTO DIFF WBC: CPT | Performed by: EMERGENCY MEDICINE

## 2019-07-19 PROCEDURE — 99222 1ST HOSP IP/OBS MODERATE 55: CPT | Mod: AI | Performed by: HOSPITALIST

## 2019-07-19 PROCEDURE — 83690 ASSAY OF LIPASE: CPT | Performed by: EMERGENCY MEDICINE

## 2019-07-19 PROCEDURE — 74183 MRI ABD W/O CNTR FLWD CNTR: CPT

## 2019-07-19 PROCEDURE — 25800030 ZZH RX IP 258 OP 636: Performed by: EMERGENCY MEDICINE

## 2019-07-19 PROCEDURE — 71046 X-RAY EXAM CHEST 2 VIEWS: CPT

## 2019-07-19 PROCEDURE — A9585 GADOBUTROL INJECTION: HCPCS | Performed by: NURSE PRACTITIONER

## 2019-07-19 PROCEDURE — 25000128 H RX IP 250 OP 636: Performed by: HOSPITALIST

## 2019-07-19 PROCEDURE — 80053 COMPREHEN METABOLIC PANEL: CPT | Performed by: EMERGENCY MEDICINE

## 2019-07-19 PROCEDURE — 25000128 H RX IP 250 OP 636: Performed by: EMERGENCY MEDICINE

## 2019-07-19 PROCEDURE — 96376 TX/PRO/DX INJ SAME DRUG ADON: CPT

## 2019-07-19 PROCEDURE — 25800030 ZZH RX IP 258 OP 636: Performed by: HOSPITALIST

## 2019-07-19 PROCEDURE — 12000000 ZZH R&B MED SURG/OB

## 2019-07-19 RX ORDER — MULTIPLE VITAMINS W/ MINERALS TAB 9MG-400MCG
1 TAB ORAL DAILY
COMMUNITY

## 2019-07-19 RX ORDER — NALOXONE HYDROCHLORIDE 0.4 MG/ML
.1-.4 INJECTION, SOLUTION INTRAMUSCULAR; INTRAVENOUS; SUBCUTANEOUS
Status: DISCONTINUED | OUTPATIENT
Start: 2019-07-19 | End: 2019-07-21 | Stop reason: HOSPADM

## 2019-07-19 RX ORDER — ONDANSETRON 2 MG/ML
4 INJECTION INTRAMUSCULAR; INTRAVENOUS EVERY 30 MIN PRN
Status: DISCONTINUED | OUTPATIENT
Start: 2019-07-19 | End: 2019-07-19

## 2019-07-19 RX ORDER — SODIUM CHLORIDE 9 MG/ML
1000 INJECTION, SOLUTION INTRAVENOUS CONTINUOUS
Status: DISCONTINUED | OUTPATIENT
Start: 2019-07-19 | End: 2019-07-19

## 2019-07-19 RX ORDER — DIPHENHYDRAMINE HYDROCHLORIDE 50 MG/ML
25-50 INJECTION INTRAMUSCULAR; INTRAVENOUS EVERY 6 HOURS PRN
Status: DISCONTINUED | OUTPATIENT
Start: 2019-07-19 | End: 2019-07-21 | Stop reason: HOSPADM

## 2019-07-19 RX ORDER — ONDANSETRON 4 MG/1
4 TABLET, ORALLY DISINTEGRATING ORAL EVERY 6 HOURS PRN
Status: DISCONTINUED | OUTPATIENT
Start: 2019-07-19 | End: 2019-07-21 | Stop reason: HOSPADM

## 2019-07-19 RX ORDER — ALBUTEROL SULFATE 90 UG/1
2 AEROSOL, METERED RESPIRATORY (INHALATION) EVERY 4 HOURS PRN
Status: DISCONTINUED | OUTPATIENT
Start: 2019-07-19 | End: 2019-07-21 | Stop reason: HOSPADM

## 2019-07-19 RX ORDER — GADOBUTROL 604.72 MG/ML
8 INJECTION INTRAVENOUS ONCE
Status: COMPLETED | OUTPATIENT
Start: 2019-07-19 | End: 2019-07-19

## 2019-07-19 RX ORDER — TRAZODONE HYDROCHLORIDE 50 MG/1
50-100 TABLET, FILM COATED ORAL
Status: DISCONTINUED | OUTPATIENT
Start: 2019-07-19 | End: 2019-07-21 | Stop reason: HOSPADM

## 2019-07-19 RX ORDER — ONDANSETRON 2 MG/ML
4 INJECTION INTRAMUSCULAR; INTRAVENOUS EVERY 6 HOURS PRN
Status: DISCONTINUED | OUTPATIENT
Start: 2019-07-19 | End: 2019-07-21 | Stop reason: HOSPADM

## 2019-07-19 RX ORDER — GABAPENTIN 300 MG/1
600 CAPSULE ORAL AT BEDTIME
Status: DISCONTINUED | OUTPATIENT
Start: 2019-07-19 | End: 2019-07-21 | Stop reason: HOSPADM

## 2019-07-19 RX ORDER — SODIUM CHLORIDE, SODIUM LACTATE, POTASSIUM CHLORIDE, CALCIUM CHLORIDE 600; 310; 30; 20 MG/100ML; MG/100ML; MG/100ML; MG/100ML
INJECTION, SOLUTION INTRAVENOUS CONTINUOUS
Status: ACTIVE | OUTPATIENT
Start: 2019-07-19 | End: 2019-07-20

## 2019-07-19 RX ORDER — LIDOCAINE 40 MG/G
CREAM TOPICAL
Status: DISCONTINUED | OUTPATIENT
Start: 2019-07-19 | End: 2019-07-21 | Stop reason: HOSPADM

## 2019-07-19 RX ADMIN — SODIUM CHLORIDE 1000 ML: 9 INJECTION, SOLUTION INTRAVENOUS at 18:39

## 2019-07-19 RX ADMIN — GADOBUTROL 8 ML: 604.72 INJECTION INTRAVENOUS at 07:06

## 2019-07-19 RX ADMIN — TRAZODONE HYDROCHLORIDE 100 MG: 50 TABLET ORAL at 22:02

## 2019-07-19 RX ADMIN — SODIUM CHLORIDE, POTASSIUM CHLORIDE, SODIUM LACTATE AND CALCIUM CHLORIDE: 600; 310; 30; 20 INJECTION, SOLUTION INTRAVENOUS at 20:09

## 2019-07-19 RX ADMIN — DIPHENHYDRAMINE HYDROCHLORIDE 50 MG: 50 INJECTION, SOLUTION INTRAMUSCULAR; INTRAVENOUS at 18:39

## 2019-07-19 RX ADMIN — SODIUM CHLORIDE 1000 ML: 9 INJECTION, SOLUTION INTRAVENOUS at 16:48

## 2019-07-19 RX ADMIN — Medication 1 MG: at 22:02

## 2019-07-19 ASSESSMENT — MIFFLIN-ST. JEOR: SCORE: 1302.92

## 2019-07-19 ASSESSMENT — ENCOUNTER SYMPTOMS
CHILLS: 0
APPETITE CHANGE: 1
NAUSEA: 0
DIARRHEA: 1
COUGH: 1
FEVER: 0
COLOR CHANGE: 1
VOMITING: 0

## 2019-07-19 ASSESSMENT — ACTIVITIES OF DAILY LIVING (ADL): ADLS_ACUITY_SCORE: 11

## 2019-07-19 NOTE — TELEPHONE ENCOUNTER
Called patient at work and she stated she is itching and cant sleep, patient states coworkers say she is jaundice patient states she will ask one of the doctors at her work if she looks more jaundice than yesterday. Patient would like a nurse to contact her at her work number [phone]. TC will fax results and referral to patient as requested by patient and TC will fax referral to [facility]. TC gave patient number to [facility] and stated they would call the patient Monday.      .Yaneli MCGARRY    [facility] [place]

## 2019-07-19 NOTE — ED PROVIDER NOTES
History     Chief Complaint:  Pruritis      HPI   Mckayla Grady is a 64 year old female who presents to the emergency department for evaluation of pruritis. The patient reports that for the last couple of weeks she has had intense pruritis that made it difficult to sleep. She saw her PCP who then ordered an abdomen US that was abnormal so MRI was obtained today, findings below. After the scan, they told the patient to come to the ED if she noticed any jaundice, and her coworkers today told her she looked yellow and the patient noticed it earlier this week as well which prompted her evaluation. She has had a poor appetite all week and has therefore lost a few pounds. She's also had watery diarrhea for a few days and a cough for six weeks, and notes that her and her  were sick with URI symptoms last month. The patient denies chills, nausea, vomiting, shortness of breath, abdominal pain, or fever.    MAGNETIC RESONANCE CHOLANGIOPANCREATOGRAPHY  7/19/2019 8:01 AM   IMPRESSION: Obstructing choledocholithiasis with biliary and  pancreatic ductal dilatation. ERCP recommended.    Allergies:  Ampicillin      Medications:    Albuterol  Neurontin  Atarax  Ativan  Prilosec  Desyrel      Past Medical History:    Esophageal reflux  Hallux rigidus  Insomnia  Numbness   Chronic bilateral low back pain  Anxiety  DDD  Lumbar foraminal stenosis     Past Surgical History:    Appendectomy  Ankle joint exploration  Hysterectomy  Colonoscopy  Discectomy lumbar  Carpal tunnel revision  Bilateral toe fusion  C section x 3    Family History:    CHF: father  Arthritis  Glaucoma  CAD: father (CABG)  Genetic disorder  Colorectal cancer  Cerebrovascular disease     Social History:  Tobacco Use: Never  Alcohol Use: Yes  PCP: Physician No Ref-Primary  Marital Status:        Review of Systems   Constitutional: Positive for appetite change. Negative for chills and fever.   Respiratory: Positive for cough.    Gastrointestinal:  "Positive for diarrhea. Negative for nausea and vomiting.   Skin: Positive for color change.   All other systems reviewed and are negative.    Physical Exam     Patient Vitals for the past 24 hrs:   BP Temp Temp src Heart Rate Resp SpO2 Height Weight   07/19/19 1612 144/68 98.3  F (36.8  C) Oral 71 16 95 % 1.6 m (5' 3\") 79.4 kg (175 lb)     Physical Exam    General: Resting comfortably on the gurney  Eyes:  The pupils are equal and round    Scleral injection  ENT:    Moist mucous membranes  Neck:  Normal range of motion  CV:  Regular rate and rhythm    Skin warm and well perfused   Resp:  Lungs are clear    Non-labored    No rales    No wheezing   GI:  Abdomen is soft, there is no rigidity    No distension    No rebound tenderness     No abdominal tenderness  MS:  Normal muscular tone  Skin:  Jaundice  Neuro:   Awake, alert.      Speech is normal and fluent.    Face is symmetric.     Moves all extremities equally  Psych: Normal affect.  Appropriate interactions.    Emergency Department Course   Imaging:   XR Chest 2 Views    (Results Pending)     Laboratory:  CBC: WBC: 7.4, HGB: 12.8, PLT: 253  CMP: Glucose 126 (H), Bilirubin total: 6.2 (H), ALKPHOS: 382 (H), ALT: 64 (H), o/w WNL (Creatinine: 0.71)    Lipase: 506 (H)    Interventions:  1648 0.9% Sodium Chloride BOLUS 1000 mLs IV     Emergency Department Course:  1625 Nursing notes and vitals reviewed. I performed an exam of the patient as documented above.     IV inserted. Medicine administered as documented above. Blood drawn. This was sent to the lab for further testing, results above.    Patient was sent for a chest XR while here in the emergency department, findings above.     1730 I rechecked the patient and discussed the results of her workup thus far.     1743  I consulted with Dr. Steward of the hospitalist services. They are in agreement to accept the patient for admission.    Findings and plan explained to the Patient who consents to admission. Discussed the " patient with Dr. Steward, who will admit the patient to an inpatient medical bed for further monitoring, evaluation, and treatment.    Impression & Plan      Medical Decision Making:  Mckayla Grady is a 64 year old female who presented to the ED with pruritis.  Reviewed her ultrasound and MRCP that was done today.  Findings consistent with choledocholithiasis on MRCP.  Thought to have a stone at sphincter of Oddi.  She does appear mildly jaundiced in the room.  She has no abdominal tenderness and looks well and doubt ascending cholangitis.  Cholecystitis also seems unlikely.  Labs show worsening bilirubin, alk phos and lipase. Will admit patient to hospital for GI consult and surgical consult.     Diagnosis:    ICD-10-CM    1. Choledocholithiasis K80.50 CBC with platelets differential     Comprehensive metabolic panel     Lipase     Disposition:  Admitted to Dr. Steward    Scribe Disclosure:  Judd SHEIKH, am serving as a scribe on 7/19/2019 at 4:25 PM to personally document services performed by Tiesha Moffett MD based on my observations and the provider's statements to me.     Judd Barreto  7/19/2019    EMERGENCY DEPARTMENT       Tiesha Diallo MD  07/19/19 0462

## 2019-07-19 NOTE — PHARMACY-ADMISSION MEDICATION HISTORY
Admission medication history interview status for the 7/19/2019  admission is complete. See EPIC admission navigator for prior to admission medications     Medication history source reliability:Good    Actions taken by pharmacist (provider contacted, etc):Reviewed SureScripts     Additional medication history information not noted on PTA med list :  - Patient reported that she stopped taking Gabapentin 300 mg about 1 month ago because she did not want to take it while having a cold for more than 6 weeks and other health problems.     Medication reconciliation/reorder completed by provider prior to medication history? No    Time spent in this activity: 5 min    Prior to Admission medications    Medication Sig Last Dose Taking? Auth Provider   albuterol (PROAIR HFA/PROVENTIL HFA/VENTOLIN HFA) 108 (90 Base) MCG/ACT inhaler Inhale 2 puffs into the lungs every 4 hours as needed for shortness of breath / dyspnea or wheezing prn Yes Jennifer Vargas MD   gabapentin (NEURONTIN) 300 MG capsule Take 2 capsules (600 mg) by mouth At Bedtime Past Month at Unknown time Yes Jennifer Vargas MD   hydrOXYzine (ATARAX) 25 MG tablet Take 1-2 tablets (25-50 mg) by mouth 3 times daily as needed for itching prn Yes Jennifer Vargas MD   LORazepam (ATIVAN) 1 MG tablet Take 1 tablet (1 mg) by mouth every 6 hours as needed for anxiety prn Yes Gael Jacobson, NP   multivitamin w/minerals (THERA-VIT-M) tablet Take 1 tablet by mouth daily 7/18/2019 at Unknown time Yes Unknown, Entered By History   omeprazole (PRILOSEC OTC) 20 MG tablet Take 20 mg by mouth as needed  prn Yes Reported, Patient   traZODone (DESYREL) 50 MG tablet TAKE 1-2 TABLETS BY MOUTH BEFORE BED FOR SLEEP AS NEEDED prn Yes Jennifer Vargas MD

## 2019-07-19 NOTE — TELEPHONE ENCOUNTER
Routing to TC to advise, please read note below. Will call patient once we are able to find out if this is done by interventional radiology or gastroenterology.     Please route back to EC Triage when you have found out. Thanks.    Kellie Whitten RN, BSN  [facility] [place]

## 2019-07-19 NOTE — TELEPHONE ENCOUNTER
I called around and found out that gastroenterology does these. It sounds like it can be done as an outpatient (in the hospital) through [facility]. Unfortunately these don't normally get done over the weekend. If the patient is not having significant pain or vomiting then I think it would be safe to wait. [facility] will likely call her on Monday to schedule an outpatient procedure. If she is having significant pain, fever, jaundice, or any concerning symptoms she needs to go to the ER as she could be developing cholecystitis.    I've placed a referral to [facility]. TC - please fax referral to [phone]

## 2019-07-19 NOTE — H&P
[facility]    History and Physical - Hospitalist Service       Date of Admission:  7/19/2019    Assessment & Plan   Mckayla Grady is a 64 year old female with history of GERD, insomnia, numbness, lower back pain, and anxiety who presented to the ED with worsening itching and jaundice.  She saw her PCP earlier this week where her bilirubin and alkaline phosphatase was elevated and subsequently completed an ultrasound which was abnormal.  MRCP was completed today and indicated obstructing choledocholithiasis.  Patient will be admitted for GI evaluation and likely ERCP.    Obstructing choledocholithiasis  Ongoing pruritus for 1-2 weeks, worsening BR and increasing alkaline phosphatase - cholestatic picture.  No pain but does endorse loose BM and decreased appetite. She is afebrile without leukocytosis.   - NPO, IVF  - GI consultation for ERCP   - prn diphenhydramine for itch    Cough, nonproductive  Likely post viral cough  - cold symptoms about 6 weeks ago, was seen in urgent care, given short steroid course; remains afebrile, no leukocytosis  - CXR ordered; if concern of infiltrate, will add on procalcitonin  - monitor    GERD  - stable, no recent PPI use at home  - although unusual, will continue PPI prn as she does at home    Insomnia  - PTA regimen to continue    Anxiety  - stable  - PTA regimen of ativan po prn will be converted to IV prn for now    Diet: npo  DVT Prophylaxis: Pneumatic Compression Devices  Negro Catheter: not present  Code Status: Full code confirmed on admission    Disposition Plan   Expected discharge: 2 - 3 days, recommended to prior living arrangement pending clinical course and ERCP.   Entered: Guero Steward MD 07/19/2019, 6:20 PM     The patient's care was discussed with the Patient and ED MD.    Guero Steward MD  [facility]    ______________________________________________________________________    Chief Complaint   Itching    History is obtained  from the patient    History of Present Illness   Mckayla Grady is a 64 year old female with history of GERD, insomnia, numbness, lower back pain, and anxiety who presented to the ED with worsening itching and jaundice.  This is been going on for last couple weeks and patient had seen her regular doctor who ordered an abdominal ultrasound a few days ago.  Ultrasound was abnormal and MRCP was recommended.  MRCP was completed today indicating obstructing choledocholithiasis with biliary and pancreatic ductal dilation with the ERCP recommended.  Of note on the 10th of this month her total bilirubin was 4.6 which is increased today to 6.2, her alkaline phosphatase was 328 which is now 382 lipase today is 506.  She denies any fevers, chills, abdominal pain, vomiting but does endorse decreased appetite and some loose bowel movements.  Otherwise she denies any shortness of breath or chest pain.  She has had ongoing nonproductive cough for the past month and a half that initially started with cold symptoms.  Her  was treated for pneumonia at that time she was given short course of steroids.  Given this history chest x-ray was ordered in the ED will be completed soon.    In the ED patient was seen by Dr. Diallo with whom I discussed the case.  Patient is afebrile and him dynamically stable saturating normally on room air.  CMP is notable for bilirubin of 6.2, alk phos 382 ALT of 64 and a lipase of 506.  WBC 7.4 hemoglobin 12.8 platelets 253.  MRI completed today showed obstructing choledocholithiasis with biliary and pancreatic ductal dilatation.  Patient will be admitted for GI evaluation and likely ERCP.  She has not been covered with antibiotics at this time as she is afebrile, without leukocytosis, and her abdominal exam is benign.    Review of Systems    The 10 point Review of Systems is negative other than noted in the HPI or here.     Past Medical History    I have reviewed this patient's medical history and  updated it with pertinent information if needed.   Past Medical History:   Diagnosis Date     Esophageal reflux      Hallux rigidus     bilatateral     Insomnia, unspecified      Numbness and tingling     localized to LLE; > TOP OF FOOT & CALF AREAS       Past Surgical History   I have reviewed this patient's surgical history and updated it with pertinent information if needed.  Past Surgical History:   Procedure Laterality Date     APPENDECTOMY       C EXPLORE/TREAT ANKLE JOINT  1989     C TOTAL ABDOM HYSTERECTOMY  2002    with bilat ovary removal     COLONOSCOPY  4/2009     COLONOSCOPY  4/2012    Repeat in 5 years     DISCECTOMY LUMBAR POSTERIOR MICROSCOPIC ONE LEVEL Right 1/8/2015    Procedure: DISCECTOMY LUMBAR POSTERIOR MICROSCOPIC ONE LEVEL;  Surgeon: Román Smith MD;  Location: [facility]     DISKECTOMY, LUMBAR, SINGLE SP  2001    X 2     DISKECTOMY, LUMBAR, SINGLE SP  2006      HC REVISE MEDIAN N/CARPAL TUNNEL SURG  2002     HYSTERECTOMY, PAP NO LONGER INDICATED       OPTICAL TRACKING SYSTEM FUSION SPINE POSTERIOR LUMBAR TWO LEVELS N/A 8/4/2016    Procedure: OPTICAL TRACKING SYSTEM FUSION SPINE POSTERIOR LUMBAR TWO LEVELS;  Surgeon: Román Smith MD;  Location:  OR     ORTHOPEDIC SURGERY  9/2015    GT TOE JOINT FUSION - BILATERAL     SURGICAL HISTORY OF -       C-secs x3     SURGICAL HISTORY OF -   1976    laparoscopy benign enlarged lymph node     SURGICAL HISTORY OF -   1963    appendectomy       Social History   I have reviewed this patient's social history and updated it with pertinent information if needed.  Social History     Tobacco Use     Smoking status: Never Smoker     Smokeless tobacco: Never Used   Substance Use Topics     Alcohol use: Yes     Alcohol/week: 0.0 oz     Comment: 6/week     Drug use: No       Family History   I have reviewed this patient's family history and updated it with pertinent information if needed.   Family History   Problem Relation Age of Onset      Heart Disease Father         CHF     Arthritis Father      Eye Disorder Father         glaucoma     C.A.D. Father         CABG     Genetic Disorder Father      Cancer - colorectal Mother      Cerebrovascular Disease Maternal Grandmother      Eye Disorder Paternal Grandmother         glaucoma     Diabetes No family hx of      Hypertension No family hx of      Breast Cancer No family hx of      Alzheimer Disease No family hx of      Anesthesia Reaction No family hx of      Blood Disease No family hx of      Neurologic Disorder No family hx of      Thyroid Disease No family hx of      Osteoporosis No family hx of        Prior to Admission Medications   Prior to Admission Medications   Prescriptions Last Dose Informant Patient Reported? Taking?   LORazepam (ATIVAN) 1 MG tablet prn Self No Yes   Sig: Take 1 tablet (1 mg) by mouth every 6 hours as needed for anxiety   albuterol (PROAIR HFA/PROVENTIL HFA/VENTOLIN HFA) 108 (90 Base) MCG/ACT inhaler prn Self No Yes   Sig: Inhale 2 puffs into the lungs every 4 hours as needed for shortness of breath / dyspnea or wheezing   gabapentin (NEURONTIN) 300 MG capsule Past Month at Unknown time Self No Yes   Sig: Take 2 capsules (600 mg) by mouth At Bedtime   hydrOXYzine (ATARAX) 25 MG tablet prn Self No Yes   Sig: Take 1-2 tablets (25-50 mg) by mouth 3 times daily as needed for itching   multivitamin w/minerals (THERA-VIT-M) tablet 7/18/2019 at Unknown time Self Yes Yes   Sig: Take 1 tablet by mouth daily   omeprazole (PRILOSEC OTC) 20 MG tablet prn Self Yes Yes   Sig: Take 20 mg by mouth as needed    traZODone (DESYREL) 50 MG tablet prn Self No Yes   Sig: TAKE 1-2 TABLETS BY MOUTH BEFORE BED FOR SLEEP AS NEEDED      Facility-Administered Medications: None     Allergies   Allergies   Allergen Reactions     Ampicillin      GI upset       Physical Exam   Vital Signs: Temp: 98.3  F (36.8  C) Temp src: Oral BP: 144/68   Heart Rate: 71 Resp: 16 SpO2: 95 %      Weight: 175 lbs 0  oz    Gen: NAD, pleasant, jaundiced diffusely, scleral icterus noted  HEENT: Normocephalic, EOMI, MMM  Resp: no crackles,  no wheezes, no increased work of resp  CV: S1S2 heard, reg rhythm, reg rate, no pedal edema  Abdo: soft, nontender, nondistended, bowel sounds present  Ext: calves nontender, well perfused  Neuro: AAOx3, CN grossly intact, no facial asymmetry      Data   Data reviewed today: I reviewed all medications, new labs and imaging results over the last 24 hours. I personally reviewed no images or EKG's today.    Recent Labs   Lab 07/19/19  1649   WBC 7.4   HGB 12.8   MCV 96         POTASSIUM 3.8   CHLORIDE 109   CO2 27   BUN 19   CR 0.71   ANIONGAP 5   CARMEN 9.5   *   ALBUMIN 3.6   PROTTOTAL 7.5   BILITOTAL 6.2*   ALKPHOS 382*   ALT 64*   AST 37   LIPASE 506*     Recent Results (from the past 24 hour(s))   MR Abdomen MRCP w/o & w Contrast    Narrative    MAGNETIC RESONANCE CHOLANGIOPANCREATOGRAPHY  7/19/2019 8:01 AM     HISTORY: Abnormal liver function tests (LFTs), elevated. Symptomatic  (pruritis). Dilated bile duct by ultrasound.    COMPARISON: Ultrasound from July 17, 2019.    TECHNIQUE: Multiplanar, multisequence images of the abdomen acquired  before and after administration of 8 mL Gadavist intravenous contrast.  MRCP images and coronal 3-D thin section T2-weighted MRCP images  through the biliary tree. 3D image reformatting was performed on the  acquisition scanner.    FINDINGS: There are filling defects in the common bile duct measuring  up to 9 mm in diameter compatible with choledocholithiasis and/or  sludge. There is likely an obstructing stone at the sphincter of Oddi.  There is proximal intra- and extrahepatic biliary dilatation and  gallbladder distention. No definite gallbladder wall thickening.  Pancreatic duct is dilated up to 6 mm, likely related to an  obstructing stone at the sphincter of Oddi. Kidneys, adrenals, and  spleen unremarkable.      Impression     IMPRESSION: Obstructing choledocholithiasis with biliary and  pancreatic ductal dilatation. ERCP recommended.    PALAK NIETO MD

## 2019-07-19 NOTE — TELEPHONE ENCOUNTER
Please let Mckayla know that the MRCP that was ordered for her shows obstruction of her common bile duct with distal dilation and gallbladder distention. She needs an ERCP to have this corrected.     TC - I need to know if this is done by Interventional radiology or gastroenterology. Please call to clarify and then I can place the appropriate order.

## 2019-07-19 NOTE — ED NOTES
"[facility]  ED Nurse Handoff Report    ED Chief complaint: Pruritis      ED Diagnosis:   Final diagnoses:   Choledocholithiasis       Code Status: Full Code    Allergies:   Allergies   Allergen Reactions     Ampicillin      GI upset       Activity level - Baseline/Home:  Independent  Activity Level - Current:   Independent    Patient's Preferred language: English   Needed?: No    Isolation: No  Infection: Not Applicable  Bariatric?: No    Vital Signs:   Vitals:    07/19/19 1612   BP: 144/68   Resp: 16   Temp: 98.3  F (36.8  C)   TempSrc: Oral   SpO2: 95%   Weight: 79.4 kg (175 lb)   Height: 1.6 m (5' 3\")       Cardiac Rhythm: ,        Pain level:      Is this patient confused?: No   Does this patient have a guardian?  No         If yes, is there guardianship documents in the Epic \"Code/ACP\" activity?  N/A         Guardian Notified?  N/A  [place] - Suicide Severity Rating Scale Completed?  Yes  If yes, what color did the patient score?  White    Patient Report: Initial Complaint: Jaundice and itching throughout  Focused Assessment: Pt here after getting a MRCP and waiting for ERCP. Pt was at home but told if she had further symptoms to come to ED. Pt became very jaundice and increased itching. Pt denies nausea or stomach pain. Pt has a history of gull stones  Tests Performed: labs  Abnormal Results: see results  Treatments provided: fluids    Family Comments: no available    OBS brochure/video discussed/provided to patient/family: N/A              Name of person given brochure if not patient: na              Relationship to patient: na    ED Medications:   Medications   0.9% sodium chloride BOLUS (1,000 mLs Intravenous New Bag 7/19/19 9931)     Followed by   sodium chloride 0.9% infusion (has no administration in time range)   ondansetron (ZOFRAN) injection 4 mg (has no administration in time range)       Drips infusing?:  Yes    For the majority of the shift this patient was Green. "   Interventions performed were none.    Severe Sepsis OR Septic Shock Diagnosis Present: No    To be done/followed up on inpatient unit:  Monitor labs and itching. Waiting on ERCP.    ED NURSE PHONE NUMBER: [phone]

## 2019-07-19 NOTE — ED TRIAGE NOTES
Pt had MRCP today and was told needed to have ERCP but to go to ER if she was having other symptoms. Pt is itching all over, denies abd pain, and c/o of being jaundice.

## 2019-07-19 NOTE — TELEPHONE ENCOUNTER
"Pt called back and states she is in the car with her son and wondering if she can come to the clinic and have someone look at her real quick rather than go to the er?  Wondering what they will do for her in the er?    Advised we don't have any appts in the clinic or anyone that can see her \"real quick\".  Recommend the ER since they can evaluate her and will call in a GI specialist for evaluation.    Pt states will go to the er.    Cecy HOLLOWAY RN  EP Triage    "

## 2019-07-19 NOTE — PROGRESS NOTES
RECEIVING UNIT ED HANDOFF REVIEW    ED Nurse Handoff Report was reviewed by: Iraida Ross on July 19, 2019 at 6:25 PM

## 2019-07-19 NOTE — TELEPHONE ENCOUNTER
Patient given message from Dr. Dickerson. States that she is quite jaundice. States that her skin is yellow and itching is keeping her awake at night.  Advised the patient to go to the ER. She was hesitant initially but stressed that she go.  States that she will make a few calls and then go to ER.  Brittanie Jiang RN

## 2019-07-20 ENCOUNTER — APPOINTMENT (OUTPATIENT)
Dept: GENERAL RADIOLOGY | Facility: CLINIC | Age: 64
DRG: 445 | End: 2019-07-20
Attending: INTERNAL MEDICINE
Payer: COMMERCIAL

## 2019-07-20 ENCOUNTER — ANESTHESIA (OUTPATIENT)
Dept: SURGERY | Facility: CLINIC | Age: 64
DRG: 445 | End: 2019-07-20
Payer: COMMERCIAL

## 2019-07-20 ENCOUNTER — ANESTHESIA EVENT (OUTPATIENT)
Dept: SURGERY | Facility: CLINIC | Age: 64
DRG: 445 | End: 2019-07-20
Payer: COMMERCIAL

## 2019-07-20 LAB
ALBUMIN SERPL-MCNC: 2.9 G/DL (ref 3.4–5)
ALP SERPL-CCNC: 325 U/L (ref 40–150)
ALT SERPL W P-5'-P-CCNC: 48 U/L (ref 0–50)
ANION GAP SERPL CALCULATED.3IONS-SCNC: 4 MMOL/L (ref 3–14)
AST SERPL W P-5'-P-CCNC: 31 U/L (ref 0–45)
BILIRUB SERPL-MCNC: 5.8 MG/DL (ref 0.2–1.3)
BUN SERPL-MCNC: 12 MG/DL (ref 7–30)
CALCIUM SERPL-MCNC: 8.6 MG/DL (ref 8.5–10.1)
CHLORIDE SERPL-SCNC: 113 MMOL/L (ref 94–109)
CO2 SERPL-SCNC: 26 MMOL/L (ref 20–32)
CREAT SERPL-MCNC: 0.67 MG/DL (ref 0.52–1.04)
GFR SERPL CREATININE-BSD FRML MDRD: >90 ML/MIN/{1.73_M2}
GLUCOSE SERPL-MCNC: 93 MG/DL (ref 70–99)
INR PPP: 0.96 (ref 0.86–1.14)
POTASSIUM SERPL-SCNC: 3.7 MMOL/L (ref 3.4–5.3)
PROT SERPL-MCNC: 5.9 G/DL (ref 6.8–8.8)
SODIUM SERPL-SCNC: 143 MMOL/L (ref 133–144)

## 2019-07-20 PROCEDURE — BF141ZZ FLUOROSCOPY OF GALLBLADDER, BILE DUCTS AND PANCREATIC DUCTS USING LOW OSMOLAR CONTRAST: ICD-10-PCS | Performed by: INTERNAL MEDICINE

## 2019-07-20 PROCEDURE — 37000009 ZZH ANESTHESIA TECHNICAL FEE, EACH ADDTL 15 MIN: Performed by: INTERNAL MEDICINE

## 2019-07-20 PROCEDURE — 25000128 H RX IP 250 OP 636: Performed by: HOSPITALIST

## 2019-07-20 PROCEDURE — 74330 X-RAY BILE/PANC ENDOSCOPY: CPT

## 2019-07-20 PROCEDURE — 25000128 H RX IP 250 OP 636: Performed by: NURSE ANESTHETIST, CERTIFIED REGISTERED

## 2019-07-20 PROCEDURE — 37000008 ZZH ANESTHESIA TECHNICAL FEE, 1ST 30 MIN: Performed by: INTERNAL MEDICINE

## 2019-07-20 PROCEDURE — 25000125 ZZHC RX 250: Performed by: NURSE ANESTHETIST, CERTIFIED REGISTERED

## 2019-07-20 PROCEDURE — 12000000 ZZH R&B MED SURG/OB

## 2019-07-20 PROCEDURE — 25800030 ZZH RX IP 258 OP 636: Performed by: HOSPITALIST

## 2019-07-20 PROCEDURE — 25000132 ZZH RX MED GY IP 250 OP 250 PS 637: Performed by: HOSPITALIST

## 2019-07-20 PROCEDURE — 80053 COMPREHEN METABOLIC PANEL: CPT | Performed by: HOSPITALIST

## 2019-07-20 PROCEDURE — 36415 COLL VENOUS BLD VENIPUNCTURE: CPT | Performed by: INTERNAL MEDICINE

## 2019-07-20 PROCEDURE — 40000170 ZZH STATISTIC PRE-PROCEDURE ASSESSMENT II: Performed by: INTERNAL MEDICINE

## 2019-07-20 PROCEDURE — 85610 PROTHROMBIN TIME: CPT | Performed by: INTERNAL MEDICINE

## 2019-07-20 PROCEDURE — 0F798DZ DILATION OF COMMON BILE DUCT WITH INTRALUMINAL DEVICE, VIA NATURAL OR ARTIFICIAL OPENING ENDOSCOPIC: ICD-10-PCS | Performed by: INTERNAL MEDICINE

## 2019-07-20 PROCEDURE — 36000056 ZZH SURGERY LEVEL 3 1ST 30 MIN: Performed by: INTERNAL MEDICINE

## 2019-07-20 PROCEDURE — 36415 COLL VENOUS BLD VENIPUNCTURE: CPT | Performed by: HOSPITALIST

## 2019-07-20 PROCEDURE — 88305 TISSUE EXAM BY PATHOLOGIST: CPT | Performed by: INTERNAL MEDICINE

## 2019-07-20 PROCEDURE — 0FBC8ZX EXCISION OF AMPULLA OF VATER, VIA NATURAL OR ARTIFICIAL OPENING ENDOSCOPIC, DIAGNOSTIC: ICD-10-PCS | Performed by: INTERNAL MEDICINE

## 2019-07-20 PROCEDURE — 71000012 ZZH RECOVERY PHASE 1 LEVEL 1 FIRST HR: Performed by: INTERNAL MEDICINE

## 2019-07-20 PROCEDURE — 88305 TISSUE EXAM BY PATHOLOGIST: CPT | Mod: 26 | Performed by: INTERNAL MEDICINE

## 2019-07-20 PROCEDURE — 36000058 ZZH SURGERY LEVEL 3 EA 15 ADDTL MIN: Performed by: INTERNAL MEDICINE

## 2019-07-20 PROCEDURE — 99232 SBSQ HOSP IP/OBS MODERATE 35: CPT | Performed by: HOSPITALIST

## 2019-07-20 PROCEDURE — 25800030 ZZH RX IP 258 OP 636: Performed by: NURSE ANESTHETIST, CERTIFIED REGISTERED

## 2019-07-20 PROCEDURE — 25000132 ZZH RX MED GY IP 250 OP 250 PS 637: Performed by: INTERNAL MEDICINE

## 2019-07-20 RX ORDER — LIDOCAINE 40 MG/G
CREAM TOPICAL
Status: DISCONTINUED | OUTPATIENT
Start: 2019-07-20 | End: 2019-07-20 | Stop reason: HOSPADM

## 2019-07-20 RX ORDER — ONDANSETRON 4 MG/1
4 TABLET, ORALLY DISINTEGRATING ORAL EVERY 30 MIN PRN
Status: DISCONTINUED | OUTPATIENT
Start: 2019-07-20 | End: 2019-07-20 | Stop reason: HOSPADM

## 2019-07-20 RX ORDER — NALOXONE HYDROCHLORIDE 0.4 MG/ML
.1-.4 INJECTION, SOLUTION INTRAMUSCULAR; INTRAVENOUS; SUBCUTANEOUS
Status: DISCONTINUED | OUTPATIENT
Start: 2019-07-20 | End: 2019-07-20

## 2019-07-20 RX ORDER — NALOXONE HYDROCHLORIDE 0.4 MG/ML
.1-.4 INJECTION, SOLUTION INTRAMUSCULAR; INTRAVENOUS; SUBCUTANEOUS
Status: DISCONTINUED | OUTPATIENT
Start: 2019-07-20 | End: 2019-07-21

## 2019-07-20 RX ORDER — PROPOFOL 10 MG/ML
INJECTION, EMULSION INTRAVENOUS CONTINUOUS PRN
Status: DISCONTINUED | OUTPATIENT
Start: 2019-07-20 | End: 2019-07-20

## 2019-07-20 RX ORDER — INDOMETHACIN 50 MG/1
100 SUPPOSITORY RECTAL
Status: DISCONTINUED | OUTPATIENT
Start: 2019-07-20 | End: 2019-07-20 | Stop reason: HOSPADM

## 2019-07-20 RX ORDER — LORAZEPAM 1 MG/1
1 TABLET ORAL ONCE
Status: COMPLETED | OUTPATIENT
Start: 2019-07-20 | End: 2019-07-20

## 2019-07-20 RX ORDER — LORAZEPAM 0.5 MG/1
.5-1 TABLET ORAL 2 TIMES DAILY PRN
Status: DISCONTINUED | OUTPATIENT
Start: 2019-07-20 | End: 2019-07-21 | Stop reason: HOSPADM

## 2019-07-20 RX ORDER — HYDROXYZINE HYDROCHLORIDE 25 MG/1
25-50 TABLET, FILM COATED ORAL 3 TIMES DAILY PRN
Status: DISCONTINUED | OUTPATIENT
Start: 2019-07-20 | End: 2019-07-21 | Stop reason: HOSPADM

## 2019-07-20 RX ORDER — FLUMAZENIL 0.1 MG/ML
0.2 INJECTION, SOLUTION INTRAVENOUS
Status: DISCONTINUED | OUTPATIENT
Start: 2019-07-20 | End: 2019-07-20

## 2019-07-20 RX ORDER — FENTANYL CITRATE 50 UG/ML
INJECTION, SOLUTION INTRAMUSCULAR; INTRAVENOUS PRN
Status: DISCONTINUED | OUTPATIENT
Start: 2019-07-20 | End: 2019-07-20

## 2019-07-20 RX ORDER — GLYCOPYRROLATE 0.2 MG/ML
INJECTION, SOLUTION INTRAMUSCULAR; INTRAVENOUS PRN
Status: DISCONTINUED | OUTPATIENT
Start: 2019-07-20 | End: 2019-07-20

## 2019-07-20 RX ORDER — SODIUM CHLORIDE, SODIUM LACTATE, POTASSIUM CHLORIDE, CALCIUM CHLORIDE 600; 310; 30; 20 MG/100ML; MG/100ML; MG/100ML; MG/100ML
INJECTION, SOLUTION INTRAVENOUS CONTINUOUS
Status: DISCONTINUED | OUTPATIENT
Start: 2019-07-20 | End: 2019-07-20 | Stop reason: HOSPADM

## 2019-07-20 RX ORDER — PROPOFOL 10 MG/ML
INJECTION, EMULSION INTRAVENOUS PRN
Status: DISCONTINUED | OUTPATIENT
Start: 2019-07-20 | End: 2019-07-20

## 2019-07-20 RX ORDER — HYDROMORPHONE HYDROCHLORIDE 1 MG/ML
.3-.5 INJECTION, SOLUTION INTRAMUSCULAR; INTRAVENOUS; SUBCUTANEOUS EVERY 5 MIN PRN
Status: DISCONTINUED | OUTPATIENT
Start: 2019-07-20 | End: 2019-07-20 | Stop reason: HOSPADM

## 2019-07-20 RX ORDER — FENTANYL CITRATE 50 UG/ML
25-50 INJECTION, SOLUTION INTRAMUSCULAR; INTRAVENOUS
Status: DISCONTINUED | OUTPATIENT
Start: 2019-07-20 | End: 2019-07-20 | Stop reason: HOSPADM

## 2019-07-20 RX ORDER — ONDANSETRON 2 MG/ML
4 INJECTION INTRAMUSCULAR; INTRAVENOUS EVERY 30 MIN PRN
Status: DISCONTINUED | OUTPATIENT
Start: 2019-07-20 | End: 2019-07-20 | Stop reason: HOSPADM

## 2019-07-20 RX ORDER — FLUMAZENIL 0.1 MG/ML
0.2 INJECTION, SOLUTION INTRAVENOUS
Status: ACTIVE | OUTPATIENT
Start: 2019-07-20 | End: 2019-07-21

## 2019-07-20 RX ADMIN — TRAZODONE HYDROCHLORIDE 50 MG: 50 TABLET ORAL at 21:30

## 2019-07-20 RX ADMIN — DIPHENHYDRAMINE HYDROCHLORIDE 50 MG: 50 INJECTION, SOLUTION INTRAMUSCULAR; INTRAVENOUS at 11:22

## 2019-07-20 RX ADMIN — DEXMEDETOMIDINE HYDROCHLORIDE 8 MCG: 100 INJECTION, SOLUTION INTRAVENOUS at 13:21

## 2019-07-20 RX ADMIN — FENTANYL CITRATE 50 MCG: 50 INJECTION, SOLUTION INTRAMUSCULAR; INTRAVENOUS at 13:18

## 2019-07-20 RX ADMIN — PROPOFOL 200 MCG/KG/MIN: 10 INJECTION, EMULSION INTRAVENOUS at 13:23

## 2019-07-20 RX ADMIN — DEXMEDETOMIDINE HYDROCHLORIDE 8 MCG: 100 INJECTION, SOLUTION INTRAVENOUS at 13:23

## 2019-07-20 RX ADMIN — SODIUM CHLORIDE, POTASSIUM CHLORIDE, SODIUM LACTATE AND CALCIUM CHLORIDE: 600; 310; 30; 20 INJECTION, SOLUTION INTRAVENOUS at 06:16

## 2019-07-20 RX ADMIN — LORAZEPAM 1 MG: 1 TABLET ORAL at 05:05

## 2019-07-20 RX ADMIN — DIPHENHYDRAMINE HYDROCHLORIDE 50 MG: 50 INJECTION, SOLUTION INTRAMUSCULAR; INTRAVENOUS at 02:59

## 2019-07-20 RX ADMIN — PROPOFOL 50 MG: 10 INJECTION, EMULSION INTRAVENOUS at 13:23

## 2019-07-20 RX ADMIN — LORAZEPAM 1 MG: 0.5 TABLET ORAL at 21:30

## 2019-07-20 RX ADMIN — PROPOFOL 20 MG: 10 INJECTION, EMULSION INTRAVENOUS at 13:48

## 2019-07-20 RX ADMIN — PROPOFOL 10 MG: 10 INJECTION, EMULSION INTRAVENOUS at 13:50

## 2019-07-20 RX ADMIN — Medication 1 MG: at 21:31

## 2019-07-20 RX ADMIN — MIDAZOLAM 2 MG: 1 INJECTION INTRAMUSCULAR; INTRAVENOUS at 13:18

## 2019-07-20 RX ADMIN — GLYCOPYRROLATE 0.2 MG: 0.2 INJECTION, SOLUTION INTRAMUSCULAR; INTRAVENOUS at 13:25

## 2019-07-20 ASSESSMENT — ACTIVITIES OF DAILY LIVING (ADL)
ADLS_ACUITY_SCORE: 11

## 2019-07-20 ASSESSMENT — MIFFLIN-ST. JEOR: SCORE: 1303.83

## 2019-07-20 ASSESSMENT — LIFESTYLE VARIABLES: TOBACCO_USE: 0

## 2019-07-20 NOTE — PROGRESS NOTES
[facility]    Medicine Progress Note - Hospitalist Service       Date of Admission:  7/19/2019  Assessment & Plan    Mckayla Grady is a 64 year old female with history of GERD, insomnia, numbness, lower back pain, and anxiety who presented to the ED with worsening itching and jaundice.  She saw her PCP earlier this week where her bilirubin and alkaline phosphatase was elevated and subsequently completed an ultrasound which was abnormal.  MRCP was completed today and indicated obstructing choledocholithiasis.  Patient will be admitted for GI evaluation and likely ERCP.    Obstructing choledocholithiasis  Ongoing pruritus for 1-2 weeks, worsening BR and increasing alkaline phosphatase - cholestatic picture.  No pain but does endorse loose BM and decreased appetite. She is afebrile without leukocytosis.   - IVF can saline lock when taking adequate PO - clear liquid diet per GI  - GI consultation for ERCP - planning for today 7/20  - prn diphenhydramine for itch  - 7/20 transaminases normal, alk phos calming - 325 from 382, bilirubin 5.8 from 6.2    Cough, nonproductive  Likely post viral cough  - cold symptoms about 6 weeks ago, was seen in urgent care, given short steroid course; remains afebrile, no leukocytosis  - CXR showed no infiltrates/effusions 7/19  - monitor    GERD  - stable, no recent PPI use at home  - although unusual, will continue PPI prn as she does at home    Insomnia  - PTA regimen to continue    Anxiety  - stable  - PTA regimen of ativan po prn will be available at decreased regimen if possible      Diet: NPO for Medical/Clinical Reasons Except for: Meds, Ice Chips  -- after ERCP diet per GI/Surg - clear liquids started  DVT Prophylaxis: Pneumatic Compression Devices  Negro Catheter: not present  Code Status: Full Code      Disposition Plan   Expected discharge: likely 1-2 days, pending ERCP and GI recs.  Entered: Guero Steward MD 07/20/2019, 9:05 AM       The patient's care was  discussed with the Bedside Nurse and Patient.    Guero Steward MD  Hospitalist Service  [facility]    ______________________________________________________________________    Interval History   Seen and examined after ERCP completed. Uneventful night - continues to have significant itching diffusely. Denied f/c. No abdo pain. Ampulla biopsies taken - concern of mass in the distal duct noted. General surgery consulted. Sphincterotomy also completed and temp straight plastic stent in place.    Data reviewed today: I reviewed all medications, new labs and imaging results over the last 24 hours. I personally reviewed no images or EKG's today.    Physical Exam   Vital Signs: Temp: 97.5  F (36.4  C) Temp src: Oral BP: (!) 102/37   Heart Rate: 51 Resp: 18 SpO2: 91 % O2 Device: None (Room air)    Weight: 175 lbs 3.2 oz    Gen: NAD, pleasant  HEENT: Normocephalic, EOMI, MMM  Resp: no crackles,  no wheezes, no increased work of resp  CV: S1S2 heard, reg rhythm, reg rate, no pedal edema  Abdo: soft, nontender, nondistended, bowel sounds present  Ext: calves nontender, well perfused  Neuro: AAOx3, CN grossly intact, no facial asymmetry      Data   Recent Labs   Lab 07/20/19  0839 07/20/19  0645 07/19/19  1649   WBC  --   --  7.4   HGB  --   --  12.8   MCV  --   --  96   PLT  --   --  253   INR 0.96  --   --    NA  --  143 141   POTASSIUM  --  3.7 3.8   CHLORIDE  --  113* 109   CO2  --  26 27   BUN  --  12 19   CR  --  0.67 0.71   ANIONGAP  --  4 5   CARMEN  --  8.6 9.5   GLC  --  93 126*   ALBUMIN  --  2.9* 3.6   PROTTOTAL  --  5.9* 7.5   BILITOTAL  --  5.8* 6.2*   ALKPHOS  --  325* 382*   ALT  --  48 64*   AST  --  31 37   LIPASE  --   --  506*     Recent Results (from the past 24 hour(s))   XR Chest 2 Views    Narrative    XR CHEST TWO VIEWS   7/19/2019 5:51 PM     HISTORY: Cough.    COMPARISON: None.      Impression    IMPRESSION: PA and lateral views of the chest. Lungs are clear. Heart  is normal in  size. No effusions are evident. No pneumothorax.    CHRISTIANE MAYEN MD

## 2019-07-20 NOTE — OR NURSING
Dr.s Michelle Leahy and Hansel Tatum in to see patient.  No new orders.   at bedside.Wedding ring given to  in labeled bag.

## 2019-07-20 NOTE — ANESTHESIA POSTPROCEDURE EVALUATION
Patient: Mckayla Grady    Procedure(s):  ENDOSCOPIC RETROGRADE CHOLANGIOPANCREATPGRAPHY, SPINCTEROTOMY, BALLOON SWEEP, AMPILLARY BIOPSY, STENT PLACEMENT, SPHINCTEROTOMY    Diagnosis:UNKNOWN  Diagnosis Additional Information: No value filed.    Anesthesia Type:  MAC    Note:  Anesthesia Post Evaluation    Patient location during evaluation: PACU  Patient participation: Able to fully participate in evaluation  Level of consciousness: awake and alert  Pain management: adequate  Airway patency: patent  Cardiovascular status: acceptable  Respiratory status: acceptable  Hydration status: acceptable  PONV: none     Anesthetic complications: None          Last vitals:  Vitals:    07/20/19 1440 07/20/19 1450 07/20/19 1500   BP: 103/58 102/56 111/51   Pulse: 54 54 57   Resp: 15 15 15   Temp: 36.9  C (98.4  F) 37  C (98.6  F) 37.1  C (98.8  F)   SpO2: 97% 97% 97%         Electronically Signed By: Shane Messer MD  July 20, 2019  3:15 PM

## 2019-07-20 NOTE — ANESTHESIA PREPROCEDURE EVALUATION
Anesthesia Pre-Procedure Evaluation    Patient: Mckayla Grady   MRN: [id number] : 1955          Preoperative Diagnosis: UNKNOWN    Procedure(s):  ERCP FOR COMMON BILE DUCT STONES    Past Medical History:   Diagnosis Date     Esophageal reflux      Hallux rigidus     bilatateral     Insomnia, unspecified      Numbness and tingling     localized to LLE; > TOP OF FOOT & CALF AREAS     Past Surgical History:   Procedure Laterality Date     APPENDECTOMY       C EXPLORE/TREAT ANKLE JOINT       C TOTAL ABDOM HYSTERECTOMY      with bilat ovary removal     COLONOSCOPY  2009     COLONOSCOPY  2012    Repeat in 5 years     DISCECTOMY LUMBAR POSTERIOR MICROSCOPIC ONE LEVEL Right 2015    Procedure: DISCECTOMY LUMBAR POSTERIOR MICROSCOPIC ONE LEVEL;  Surgeon: Román Smith MD;  Location: [facility] OR     DISKECTOMY, LUMBAR, SINGLE SP  2001    X 2     DISKECTOMY, LUMBAR, SINGLE SP  2006      HC REVISE MEDIAN N/CARPAL TUNNEL SURG       HYSTERECTOMY, PAP NO LONGER INDICATED       OPTICAL TRACKING SYSTEM FUSION SPINE POSTERIOR LUMBAR TWO LEVELS N/A 2016    Procedure: OPTICAL TRACKING SYSTEM FUSION SPINE POSTERIOR LUMBAR TWO LEVELS;  Surgeon: Román Smith MD;  Location:  OR     ORTHOPEDIC SURGERY  2015    GT TOE JOINT FUSION - BILATERAL     SURGICAL HISTORY OF -       C-secs x3     SURGICAL HISTORY OF -       laparoscopy benign enlarged lymph node     SURGICAL HISTORY OF -       appendectomy       Anesthesia Evaluation     .             ROS/MED HX    ENT/Pulmonary:      (-) tobacco use and sleep apnea   Neurologic:     (+)neuropathy - LLE,     Cardiovascular:         METS/Exercise Tolerance:     Hematologic:         Musculoskeletal:         GI/Hepatic:     (+) GERD Asymptomatic on medication, cholecystitis/cholelithiasis,       Renal/Genitourinary:  - ROS Renal section negative    (-) renal disease   Endo:  - neg endo ROS       Psychiatric:         Infectious Disease:     "     Malignancy:         Other:                          Physical Exam  Normal systems: cardiovascular, pulmonary and dental    Airway   Mallampati: III  TM distance: >3 FB  Neck ROM: full    Dental     Cardiovascular       Pulmonary             Lab Results   Component Value Date    WBC 7.4 07/19/2019    HGB 12.8 07/19/2019    HCT 37.1 07/19/2019     07/19/2019    SED 16 07/10/2019     07/20/2019    POTASSIUM 3.7 07/20/2019    CHLORIDE 113 (H) 07/20/2019    CO2 26 07/20/2019    BUN 12 07/20/2019    CR 0.67 07/20/2019    GLC 93 07/20/2019    CARMEN 8.6 07/20/2019    ALBUMIN 2.9 (L) 07/20/2019    PROTTOTAL 5.9 (L) 07/20/2019    ALT 48 07/20/2019    AST 31 07/20/2019    ALKPHOS 325 (H) 07/20/2019    BILITOTAL 5.8 (H) 07/20/2019    LIPASE 506 (H) 07/19/2019    INR 0.96 07/20/2019    TSH 1.85 07/10/2019    T4 1.09 07/12/2016       Preop Vitals  BP Readings from Last 3 Encounters:   07/20/19 (!) 102/37   07/10/19 130/72   07/05/19 134/76    Pulse Readings from Last 3 Encounters:   07/10/19 71   07/05/19 70   03/21/18 53      Resp Readings from Last 3 Encounters:   07/20/19 18   07/05/19 16   08/09/16 16    SpO2 Readings from Last 3 Encounters:   07/20/19 91%   07/10/19 97%   07/05/19 96%      Temp Readings from Last 1 Encounters:   07/20/19 36.4  C (97.5  F) (Oral)    Ht Readings from Last 1 Encounters:   07/19/19 1.6 m (5' 3\")      Wt Readings from Last 1 Encounters:   07/20/19 79.5 kg (175 lb 3.2 oz)    Estimated body mass index is 31.04 kg/m  as calculated from the following:    Height as of this encounter: 1.6 m (5' 3\").    Weight as of this encounter: 79.5 kg (175 lb 3.2 oz).       Anesthesia Plan      History & Physical Review  History and physical reviewed and following examination; no interval change.    ASA Status:  2 .    NPO Status:  > 8 hours    Plan for MAC   PONV prophylaxis:  Ondansetron (or other 5HT-3) and Dexamethasone or Solumedrol       Postoperative Care  Postoperative pain management:  IV " analgesics.      Consents  Anesthetic plan, risks, benefits and alternatives discussed with:  Patient..                 Shane Messer MD

## 2019-07-20 NOTE — PROVIDER NOTIFICATION
MD Notification    Notified Person: MD    Notified Person Name: Dr. Jenkins    Notification Date/Time: 7/20/19 0500    Notification Interaction: Phone    Purpose of Notification: Pt requesting home PRN Ativan dose for anxiety d/t itching    Orders Received: One time dose 1 mg Ativan    Comments:

## 2019-07-20 NOTE — PROGRESS NOTES
GI    Will see formally later this am. OR control desk is working on scheduling ERCP today with Dr. Tatum.    Michelle Leahy MD  [facility]  Pager [phone]  Office [phone]

## 2019-07-20 NOTE — ANESTHESIA CARE TRANSFER NOTE
Patient: Mckayla Grady    Procedure(s):  ENDOSCOPIC RETROGRADE CHOLANGIOPANCREATPGRAPHY, SPINCTEROTOMY, BALLOON SWEEP, AMPILLARY BIOPSY, STENT PLACEMENT, SPHINCTEROTOMY    Diagnosis: UNKNOWN  Diagnosis Additional Information: No value filed.    Anesthesia Type:   MAC     Note:  Airway :Nasal Cannula  Patient transferred to:PACU  Handoff Report: Identifed the Patient, Identified the Reponsible Provider, Reviewed the pertinent medical history, Discussed the surgical course, Reviewed Intra-OP anesthesia mangement and issues during anesthesia, Set expectations for post-procedure period and Allowed opportunity for questions and acknowledgement of understanding      Vitals: (Last set prior to Anesthesia Care Transfer)    CRNA VITALS  7/20/2019 1331 - 7/20/2019 1407      7/20/2019             Resp Rate (set):  10    EKG:  Sinus rhythm                Electronically Signed By: CLAIR Peoples CRNA  July 20, 2019  2:07 PM

## 2019-07-20 NOTE — PROCEDURES
PROCEDURE:    The patient and  had the risks, benefits and alternatives carefully explained and gave informed consent.    ANESTHESIA:  MAC anesthesia -- see anesthesia notes.    Procedure:  After induction, a standard time out was performed.  Thereafter the  side-view scope was passed into the upper esophagus w/o difficulty and passed to the second portion of the duodenum.  The ampulla appeared very bulging.  Initial cannulation with a Tri-tome and JAG wire resulted in brief cannulation and filling of the PD.  Minimal dilatation was noted.  The common duct was then freely cannulated with the help of the wire.  Cholangiogram confirmed dilated duct with distal irregularity.  A 11 mm sphincterotomy was cut w/o heme production.  A tumor was seen to exude from the ampulla as the cut was made.   Several balloon pull-throughs were performed with additional adenomatous tissue pulled into the duodenum, suggesting distal ductal tumor.  A temporary straight plastic(10 Fr x 9 cm)  Stent was deployed in good position.  Multiple ampullary biopsies were obtained.    The patient tolerated this procedure w/o apparent complication.    PLAN:  Follow clinical course and pending biopsies  Probable EUS for further evaluation    Hansel Tatum MD  [phone]

## 2019-07-20 NOTE — PLAN OF CARE
A&Ox4. VSS. Denies pain and nausea. IV Benadryl given x2 for pruritis. ERCP and stent placement performed today. Clear liquid diet, well tolerated. Independent. Active BS, passing flatus. PIV inf LR @ 75mL/hr. Plan for surgical consult. Discharge pending. Nursing will continue to monitor.

## 2019-07-20 NOTE — CONSULTS
Consult Date:  07/20/2019      GASTROENTEROLOGY CONSULTATION      REASON FOR CONSULTATION:  Choledocholithiasis.      REQUESTING PHYSICIAN:  Guero Steward MD      HISTORY OF PRESENT ILLNESS:  This is a 64-year-old female with history of reflux, whom I saw in consultation for choledocholithiasis.  She has been having itching and fatigue since the beginning of the month.  Her urine has been darker.  She denies any abdominal pain, nausea, vomiting, fevers or chills.  She also had a poor appetite.  She was admitted to [facility] yesterday.  On admission, she was found to have a total bilirubin of 6.2, alkaline phosphatase 382, ALT 64, AST 37 and lipase 56.  White count was 7.4.  She had an ultrasound done on 07/17, which showed biliary dilatation and gallbladder wall thickening.  Subsequently, she had an MRCP done last night that showed multiple filling defects in the common bile duct measuring up to 9 mm in diameter consistent with sludge or stones.  She had proximal intrahepatic and extrahepatic biliary dilatation.  The pancreatic duct was 6 mm.      PAST MEDICAL HISTORY:   1.  Reflux.   2.  Anxiety.   3.  Insomnia.      FAMILY HISTORY:  No family history of GI disease.      SOCIAL HISTORY:  The patient is .  She drinks 6 alcoholic drinks per week.  She does not use tobacco or illicit drugs.      REVIEW OF SYSTEMS:  A 10-point review of systems was done and was positive for recent upper respiratory infection with a persistent cough that is nonproductive.  Otherwise, a full 10-point review of systems was negative.      PHYSICAL EXAMINATION:   VITAL SIGNS:  Temperature 97.5, respiratory rate 18, /37, heart rate 51, pulse ox 91% on room air.   GENERAL:  The patient is awake, alert and oriented, in no acute distress.   HEENT:  Normocephalic, atraumatic.  Pupils equal, round, reactive to light.  Extraocular eye muscles intact.  Sclerae mildly icteric.  Oropharynx is clear.  Mucous membranes  are moist.   NECK:  Supple without lymphadenopathy.  There is no thyromegaly.   CHEST:  Clear to auscultation bilaterally.   HEART:  Regular rate and rhythm.   ABDOMEN:  Soft, nontender, nondistended with normoactive bowel sounds.   EXTREMITIES:  Warm without lower extremity edema, no clubbing or cyanosis.   MUSCULOSKELETAL:  No obvious rashes.   PSYCHIATRIC:  No obvious anxiety or depression.   NEUROLOGIC:  Cranial nerves II-XII grossly intact.      ASSESSMENT:  The patient is a 64-year-old female with apparent choledocholithiasis without signs of cholangitis.      PLAN:   1.  ERCP today with Dr. Tatum.    2.  Surgical consult for consideration of cholecystectomy.     3.  Further management pending results of her ERCP.      Thank you for allowing me to participate in the care of this patient.  Please contact me with any questions or concerns.         SWETHA RESENDEZ MD             D: 2019   T: 2019   MT: CHAU      Name:     NISHI MULLEN   MRN:      [id number]        Account:       [id number]   :      1955           Consult Date:  2019      Document: [id number]

## 2019-07-20 NOTE — PLAN OF CARE
Pt meets criteria to go to the floor.  Ok to discharge from PACU per Dr. Trejo.   VSS.  Report called to the floor.  Pt will be transported via cart with 02@2 Liters, capnography set up.

## 2019-07-20 NOTE — PROVIDER NOTIFICATION
Brief update:    Pt requests ativan     Home dose 1 mg q6h prn    Single 1 mg oral dose ordered at this time. Continuing PTA dosing at discretion of rounding physician.    Hai Jenkins MD  5:00 AM

## 2019-07-20 NOTE — PLAN OF CARE
A&Ox4. VSS. Denies pain and nausea. Trazodone and melatonin given at bedtime. IV Benadryl given for itching. 1x dose of Ativan given for anxiety d/t itching. NPO+ice. Independent. Active BS, passing flatus. PIV inf LR @ 75mL/hr. Plan for GI consult and possible ERCP today.

## 2019-07-21 VITALS
HEART RATE: 57 BPM | BODY MASS INDEX: 30.87 KG/M2 | SYSTOLIC BLOOD PRESSURE: 134 MMHG | HEIGHT: 63 IN | RESPIRATION RATE: 16 BRPM | WEIGHT: 174.2 LBS | OXYGEN SATURATION: 93 % | TEMPERATURE: 98.5 F | DIASTOLIC BLOOD PRESSURE: 60 MMHG

## 2019-07-21 LAB
ALBUMIN SERPL-MCNC: 2.9 G/DL (ref 3.4–5)
ALP SERPL-CCNC: 356 U/L (ref 40–150)
ALT SERPL W P-5'-P-CCNC: 45 U/L (ref 0–50)
ANION GAP SERPL CALCULATED.3IONS-SCNC: 7 MMOL/L (ref 3–14)
AST SERPL W P-5'-P-CCNC: 34 U/L (ref 0–45)
BILIRUB DIRECT SERPL-MCNC: 5.4 MG/DL (ref 0–0.2)
BILIRUB SERPL-MCNC: 6.9 MG/DL (ref 0.2–1.3)
BUN SERPL-MCNC: 9 MG/DL (ref 7–30)
CALCIUM SERPL-MCNC: 8.8 MG/DL (ref 8.5–10.1)
CHLORIDE SERPL-SCNC: 107 MMOL/L (ref 94–109)
CO2 SERPL-SCNC: 28 MMOL/L (ref 20–32)
CREAT SERPL-MCNC: 0.68 MG/DL (ref 0.52–1.04)
GFR SERPL CREATININE-BSD FRML MDRD: >90 ML/MIN/{1.73_M2}
GLUCOSE SERPL-MCNC: 105 MG/DL (ref 70–99)
LIPASE SERPL-CCNC: 193 U/L (ref 73–393)
POTASSIUM SERPL-SCNC: 3.7 MMOL/L (ref 3.4–5.3)
PROT SERPL-MCNC: 6 G/DL (ref 6.8–8.8)
SODIUM SERPL-SCNC: 142 MMOL/L (ref 133–144)

## 2019-07-21 PROCEDURE — 99239 HOSP IP/OBS DSCHRG MGMT >30: CPT | Performed by: HOSPITALIST

## 2019-07-21 PROCEDURE — 86301 IMMUNOASSAY TUMOR CA 19-9: CPT | Performed by: HOSPITALIST

## 2019-07-21 PROCEDURE — 36415 COLL VENOUS BLD VENIPUNCTURE: CPT | Performed by: HOSPITALIST

## 2019-07-21 PROCEDURE — 25000128 H RX IP 250 OP 636: Performed by: HOSPITALIST

## 2019-07-21 PROCEDURE — 25000132 ZZH RX MED GY IP 250 OP 250 PS 637: Performed by: HOSPITALIST

## 2019-07-21 PROCEDURE — 82248 BILIRUBIN DIRECT: CPT | Performed by: HOSPITALIST

## 2019-07-21 PROCEDURE — 80053 COMPREHEN METABOLIC PANEL: CPT | Performed by: HOSPITALIST

## 2019-07-21 PROCEDURE — 83690 ASSAY OF LIPASE: CPT | Performed by: HOSPITALIST

## 2019-07-21 RX ORDER — DOCUSATE SODIUM 100 MG/1
100 CAPSULE, LIQUID FILLED ORAL 2 TIMES DAILY PRN
Qty: 14 CAPSULE | Refills: 0 | Status: SHIPPED | OUTPATIENT
Start: 2019-07-21 | End: 2019-10-11

## 2019-07-21 RX ORDER — CHOLESTYRAMINE 4 G/9G
1 POWDER, FOR SUSPENSION ORAL 2 TIMES DAILY PRN
Status: DISCONTINUED | OUTPATIENT
Start: 2019-07-21 | End: 2019-07-21 | Stop reason: HOSPADM

## 2019-07-21 RX ORDER — CHOLESTYRAMINE 4 G/9G
1 POWDER, FOR SUSPENSION ORAL 2 TIMES DAILY PRN
Qty: 14 PACKET | Refills: 0 | Status: SHIPPED | OUTPATIENT
Start: 2019-07-21 | End: 2019-08-26

## 2019-07-21 RX ORDER — DOCUSATE SODIUM 100 MG/1
100 CAPSULE, LIQUID FILLED ORAL 2 TIMES DAILY PRN
Status: DISCONTINUED | OUTPATIENT
Start: 2019-07-21 | End: 2019-07-21 | Stop reason: HOSPADM

## 2019-07-21 RX ADMIN — DIPHENHYDRAMINE HYDROCHLORIDE 50 MG: 50 INJECTION, SOLUTION INTRAMUSCULAR; INTRAVENOUS at 04:09

## 2019-07-21 RX ADMIN — HYDROXYZINE HYDROCHLORIDE 25 MG: 25 TABLET ORAL at 09:01

## 2019-07-21 RX ADMIN — LORAZEPAM 0.5 MG: 0.5 TABLET ORAL at 09:00

## 2019-07-21 RX ADMIN — TRAZODONE HYDROCHLORIDE 50 MG: 50 TABLET ORAL at 00:29

## 2019-07-21 RX ADMIN — HYDROXYZINE HYDROCHLORIDE 25 MG: 25 TABLET ORAL at 00:29

## 2019-07-21 ASSESSMENT — ACTIVITIES OF DAILY LIVING (ADL)
ADLS_ACUITY_SCORE: 11

## 2019-07-21 ASSESSMENT — MIFFLIN-ST. JEOR: SCORE: 1299.3

## 2019-07-21 NOTE — DISCHARGE INSTRUCTIONS
Will need EUS within 1 week, follow up with Dr. Leahy with [facility] at [phone].     Dr. Leahy will make referral to [facility]

## 2019-07-21 NOTE — PLAN OF CARE
A&Ox4. VSS. Denied pain and nausea. Active BS, soft abdomen. Tolerated liquid diet. Independent. PRN Trazodone and melatonin given at bedtime. PRN Benadryl IV and Atarax given for itching. Jaundice. PIV SL. Plan for surgical consult today.

## 2019-07-21 NOTE — DISCHARGE SUMMARY
[facility]  Hospitalist Discharge Summary       Date of Admission:  7/19/2019  Date of Discharge:  7/21/2019  Discharging Provider: Guero Steward MD      Discharge Diagnoses   1. Choledocholithiasis s/p ERCP with temporary stent placement  2. 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    Other medical problems as below in hospital course    Follow-ups Needed After Discharge   Follow-up Appointments     Follow-up and recommended labs and tests       PCP as needed  GI in 1 week ([facility]) will need EUS  Surgery - pancreatic; [facility] or [facility] next available           Unresulted Labs Ordered in the Past 30 Days of this Admission     Date and Time Order Name Status Description    7/21/2019 0000 Cancer antigen 19-9 In process       These results will be followed up by GI/Surgery    Discharge Disposition   Discharged to home  Condition at discharge: Stable    Hospital Course       Mckayla Grady is a 64 year old female with history of GERD, insomnia, numbness, lower back pain, and anxiety who presented to the ED with worsening itching and jaundice.  She saw her PCP earlier this week where her bilirubin and alkaline phosphatase was elevated and subsequently completed an ultrasound which was abnormal.  MRCP was completed today and indicated obstructing choledocholithiasis.  Patient will be admitted for GI evaluation and likely ERCP.    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  No pain but does endorse loose BM and decreased appetite. She is afebrile without leukocytosis.   - IVF can saline lock when taking adequate PO - clear liquid diet per GI  - GI consultation for ERCP - done 7/20, temp stent placed with good drainage. Mass seen at ampulla, biopsies pending.  - prn diphenhydramine for itch; GI added cholestyramine PRN at discharge  - 7/20  transaminases normal, alk phos calming - 325 from 382, bilirubin 5.8 from 6.2  - Prior to discharge, symptoms unchanged overall, and labs not surprisingly slightly elevated, as expected with stent placement. Stent draining well on placement per GI. Expected to clear bilirubin and therefore decrease jaundice/itching in next 1-2 weeks. Patient will follow up with Pancreatic surgery - likely [facility] ([facility] unavailable?).    - Endoscopic US in 1 week with [facility]  - ERCP ampullary mass biopsies pending    Cough, nonproductive  Likely post viral cough  - cold symptoms about 6 weeks ago, was seen in urgent care, given short steroid course; remains afebrile, no leukocytosis  - CXR showed no infiltrates/effusions 7/19  - monitor    GERD  - stable, no recent PPI use at home  - although unusual, will continue PPI prn as she does at home    Insomnia  - PTA regimen to continue    Anxiety  - stable  - PTA regimen of ativan po prn will be available at decreased regimen if possible    Consultations This Hospital Stay   GASTROENTEROLOGY IP CONSULT  SURGERY GENERAL IP CONSULT    Code Status   Full Code    Time Spent on this Encounter   I, Guero Steward, personally saw the patient today and spent greater than 30 minutes discharging this patient.       Guero Steward MD  [facility]  ______________________________________________________________________    Physical Exam   Vital Signs: Temp: 98.6  F (37  C) Temp src: Oral BP: 132/61 Pulse: 57 Heart Rate: 51 Resp: 16 SpO2: 94 % O2 Device: None (Room air) Oxygen Delivery: 2 LPM  Weight: 174 lbs 3.2 oz    Gen: NAD, pleasant  HEENT: Normocephalic, EOMI, MMM  Resp: no crackles,  no wheezes, no increased work of resp  CV: S1S2 heard, reg rhythm, reg rate, no pedal edema  Abdo: soft, nontender, nondistended, bowel sounds present  Ext: calves nontender, well perfused  Neuro: AAOx3, CN grossly intact, no facial asymmetry         Primary Care Physician   Physician No  Ref-Primary    Discharge Orders      Reason for your hospital stay    Itching and jaundice, found to have choledocholithiasis in need of ERCP and stenting     Follow-up and recommended labs and tests     PCP as needed  GI in 1 week ([facility]) will need EUS  Surgery - pancreatic; [facility] next available     Activity    Your activity upon discharge: activity as tolerated     Full Code     Diet    Follow this diet upon discharge: Orders Placed This Encounter      Regular Diet Adult       Significant Results and Procedures   Most Recent 3 CBC's:  Recent Labs   Lab Test 07/19/19  1649 07/10/19  0830 07/28/16  1123   WBC 7.4 6.9  --    HGB 12.8 13.2 14.1   MCV 96 99  --     257  --      Most Recent 3 BMP's:  Recent Labs   Lab Test 07/21/19  0701 07/20/19  0645 07/19/19  1649    143 141   POTASSIUM 3.7 3.7 3.8   CHLORIDE 107 113* 109   CO2 28 26 27   BUN 9 12 19   CR 0.68 0.67 0.71   ANIONGAP 7 4 5   CARMEN 8.8 8.6 9.5   * 93 126*     Most Recent 2 LFT's:  Recent Labs   Lab Test 07/21/19  0701 07/20/19  0645   AST 34 31   ALT 45 48   ALKPHOS 356* 325*   BILITOTAL 6.9* 5.8*   ,   Results for orders placed or performed during the hospital encounter of 07/19/19   XR Chest 2 Views    Narrative    XR CHEST TWO VIEWS   7/19/2019 5:51 PM     HISTORY: Cough.    COMPARISON: None.      Impression    IMPRESSION: PA and lateral views of the chest. Lungs are clear. Heart  is normal in size. No effusions are evident. No pneumothorax.    CHRISTIANE MAYEN MD   XR ERCP    Narrative    ERCP  7/20/2019 1:55 PM     HISTORY: Choledocholithiasis.    COMPARISON: None.      Impression    IMPRESSION: A total of 5 spot fluoroscopic images obtained during  ERCP. Intrahepatic and extrahepatic bile ducts are dilated. Total  fluoroscopic time was 2.1 minutes.     GONZALES CARVAJAL MD       Discharge Medications   Current Discharge Medication List      START taking these medications    Details   cholestyramine (QUESTRAN) 4 g packet Take 1  packet (4 g) by mouth 2 times daily as needed (itching)  Qty: 14 packet, Refills: 0    Associated Diagnoses: Choledocholithiasis      docusate sodium (COLACE) 100 MG capsule Take 1 capsule (100 mg) by mouth 2 times daily as needed for constipation  Qty: 14 capsule, Refills: 0    Comments: otc if cheaper please  Associated Diagnoses: Choledocholithiasis         CONTINUE these medications which have NOT CHANGED    Details   albuterol (PROAIR HFA/PROVENTIL HFA/VENTOLIN HFA) 108 (90 Base) MCG/ACT inhaler Inhale 2 puffs into the lungs every 4 hours as needed for shortness of breath / dyspnea or wheezing  Qty: 1 Inhaler, Refills: 3    Associated Diagnoses: Wheezing      gabapentin (NEURONTIN) 300 MG capsule Take 2 capsules (600 mg) by mouth At Bedtime  Qty: 180 capsule, Refills: 3    Associated Diagnoses: Status post lumbar spinal fusion      hydrOXYzine (ATARAX) 25 MG tablet Take 1-2 tablets (25-50 mg) by mouth 3 times daily as needed for itching  Qty: 60 tablet, Refills: 3    Associated Diagnoses: Pruritic disorder      LORazepam (ATIVAN) 1 MG tablet Take 1 tablet (1 mg) by mouth every 6 hours as needed for anxiety  Qty: 20 tablet, Refills: 0    Associated Diagnoses: Insomnia due to medical condition; Pruritic disorder      multivitamin w/minerals (THERA-VIT-M) tablet Take 1 tablet by mouth daily      omeprazole (PRILOSEC OTC) 20 MG tablet Take 20 mg by mouth as needed       traZODone (DESYREL) 50 MG tablet TAKE 1-2 TABLETS BY MOUTH BEFORE BED FOR SLEEP AS NEEDED  Qty: 90 tablet, Refills: 1    Associated Diagnoses: Psychophysiological insomnia           Allergies   Allergies   Allergen Reactions     Ampicillin      GI upset

## 2019-07-21 NOTE — DISCHARGE SUMMARY
Patient discharged at 1 PM to home. IV was discontinued. Pain at time of discharge 0/10. VSS. Tolerating regular diet, denies nausea/emesis. Belongings returned to patient.  Discharge instructions and medications reviewed with patient.  Patient verbalized understanding and all questions were answered.  Prescriptions given to patient.  At time of discharge, patient condition was stable and left the unit walking escorted by DICK.

## 2019-07-21 NOTE — PROGRESS NOTES
"GASTROENTEROLOGY PROGRESS NOTE    CC:     SUBJECTIVE:  Itching but no pain    OBJECTIVE:  General Appearance:  Awake alert NAD  /61 (BP Location: Right arm)   Pulse 57   Temp 98.6  F (37  C) (Oral)   Resp 16   Ht 1.6 m (5' 3\")   Wt 79 kg (174 lb 3.2 oz)   LMP 2002   SpO2 94%   BMI 30.86 kg/m    Temp (24hrs), Av.2  F (36.8  C), Min:96.8  F (36  C), Max:98.8  F (37.1  C)    Patient Vitals for the past 72 hrs:   Weight   19 0602 79 kg (174 lb 3.2 oz)   19 0503 79.5 kg (175 lb 3.2 oz)   19 1612 79.4 kg (175 lb)       Intake/Output Summary (Last 24 hours) at 2019 0856  Last data filed at 2019 0800  Gross per 24 hour   Intake 1270 ml   Output 2400 ml   Net -1130 ml       PHYSICAL EXAM    Lungs: CTA judi  Abd; S NT ND +BS        Additional Comments:  ROS, FH, SH: See initial GI consult for details.    I have reviewed the patient's new clinical lab results:    Recent Labs   Lab Test 19  0839 19  1649 07/10/19  0830 16  1123   WBC  --  7.4 6.9  --    HGB  --  12.8 13.2 14.1   MCV  --  96 99  --    PLT  --  253 257  --    INR 0.96  --   --   --      Recent Labs   Lab Test 19  0701 19  0645 19  1649   POTASSIUM 3.7 3.7 3.8   CHLORIDE 107 113* 109   CO2 28 26 27   BUN 9 12 19   ANIONGAP 7 4 5     Recent Labs   Lab Test 19  0701 19  0645 19  1649 07/10/19  0831   ALBUMIN 2.9* 2.9* 3.6  --    BILITOTAL 6.9* 5.8* 6.2*  --    ALT 45 48 64*  --    AST 34 31 37  --    PROTEIN  --   --   --  30*   LIPASE 193  --  506*  --          Active Problems:  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 Discussed ERCP findings and further management at length  -Bx pending  -Will need outpt EUS, will try to schedule for within 1 week  -Will refer to pancreas surgery at [facility] (Dr. Steward at the  out on medical leave)  -CA 19-9 pending  -Regular diet okay  -Cholestyramine PRN itching  -Colace PRN constipation  -Even though liver tests up slightly today with stent no " reason not to expect improvement in next 1-2 days. Therefore OK from GI standpoint to discharge home today if tolerates regular diet    Will sign off call with questions    Michelle Leahy MD  [facility]  Pager: [phone]  Office: [phone]

## 2019-07-22 LAB — CANCER AG19-9 SERPL-ACNC: 11 U/ML (ref 0–37)

## 2019-07-23 LAB — COPATH REPORT: NORMAL

## 2019-07-24 ENCOUNTER — TELEPHONE (OUTPATIENT)
Dept: FAMILY MEDICINE | Facility: CLINIC | Age: 64
End: 2019-07-24

## 2019-07-24 ENCOUNTER — HOSPITAL ENCOUNTER (OUTPATIENT)
Facility: CLINIC | Age: 64
Discharge: HOME | End: 2019-07-24
Attending: INTERNAL MEDICINE | Admitting: INTERNAL MEDICINE
Payer: COMMERCIAL

## 2019-07-24 ENCOUNTER — APPOINTMENT (OUTPATIENT)
Dept: GENERAL RADIOLOGY | Facility: CLINIC | Age: 64
End: 2019-07-24
Attending: INTERNAL MEDICINE
Payer: COMMERCIAL

## 2019-07-24 ENCOUNTER — ANESTHESIA EVENT (OUTPATIENT)
Dept: SURGERY | Facility: CLINIC | Age: 64
End: 2019-07-24
Payer: COMMERCIAL

## 2019-07-24 ENCOUNTER — ANESTHESIA (OUTPATIENT)
Dept: SURGERY | Facility: CLINIC | Age: 64
End: 2019-07-24
Payer: COMMERCIAL

## 2019-07-24 VITALS
SYSTOLIC BLOOD PRESSURE: 117 MMHG | TEMPERATURE: 97.5 F | DIASTOLIC BLOOD PRESSURE: 55 MMHG | WEIGHT: 174.4 LBS | HEIGHT: 63 IN | RESPIRATION RATE: 11 BRPM | HEART RATE: 52 BPM | OXYGEN SATURATION: 94 % | BODY MASS INDEX: 30.9 KG/M2

## 2019-07-24 DIAGNOSIS — L29.9 PRURITIC DISORDER: ICD-10-CM

## 2019-07-24 DIAGNOSIS — G47.01 INSOMNIA DUE TO MEDICAL CONDITION: ICD-10-CM

## 2019-07-24 LAB
ALBUMIN SERPL-MCNC: 3.3 G/DL (ref 3.4–5)
ALP SERPL-CCNC: 384 U/L (ref 40–150)
ALT SERPL W P-5'-P-CCNC: 51 U/L (ref 0–50)
AST SERPL W P-5'-P-CCNC: 48 U/L (ref 0–45)
BILIRUB DIRECT SERPL-MCNC: 5.9 MG/DL (ref 0–0.2)
BILIRUB SERPL-MCNC: 7.3 MG/DL (ref 0.2–1.3)
ERCP: NORMAL
PROT SERPL-MCNC: 6.9 G/DL (ref 6.8–8.8)
UPPER EUS: NORMAL

## 2019-07-24 PROCEDURE — 36000056 ZZH SURGERY LEVEL 3 1ST 30 MIN: Performed by: INTERNAL MEDICINE

## 2019-07-24 PROCEDURE — 71000013 ZZH RECOVERY PHASE 1 LEVEL 1 EA ADDTL HR: Performed by: INTERNAL MEDICINE

## 2019-07-24 PROCEDURE — 93010 ELECTROCARDIOGRAM REPORT: CPT | Performed by: INTERNAL MEDICINE

## 2019-07-24 PROCEDURE — 80076 HEPATIC FUNCTION PANEL: CPT | Performed by: INTERNAL MEDICINE

## 2019-07-24 PROCEDURE — 88173 CYTOPATH EVAL FNA REPORT: CPT | Mod: 26 | Performed by: INTERNAL MEDICINE

## 2019-07-24 PROCEDURE — 25000125 ZZHC RX 250: Performed by: NURSE ANESTHETIST, CERTIFIED REGISTERED

## 2019-07-24 PROCEDURE — 27210286 ZZH BALLOON ADDITIONAL: Performed by: INTERNAL MEDICINE

## 2019-07-24 PROCEDURE — 88112 CYTOPATH CELL ENHANCE TECH: CPT | Performed by: INTERNAL MEDICINE

## 2019-07-24 PROCEDURE — 88305 TISSUE EXAM BY PATHOLOGIST: CPT | Mod: 26 | Performed by: INTERNAL MEDICINE

## 2019-07-24 PROCEDURE — 25800030 ZZH RX IP 258 OP 636: Performed by: NURSE ANESTHETIST, CERTIFIED REGISTERED

## 2019-07-24 PROCEDURE — 71000012 ZZH RECOVERY PHASE 1 LEVEL 1 FIRST HR: Performed by: INTERNAL MEDICINE

## 2019-07-24 PROCEDURE — 71000027 ZZH RECOVERY PHASE 2 EACH 15 MINS: Performed by: INTERNAL MEDICINE

## 2019-07-24 PROCEDURE — 88305 TISSUE EXAM BY PATHOLOGIST: CPT | Performed by: INTERNAL MEDICINE

## 2019-07-24 PROCEDURE — 36415 COLL VENOUS BLD VENIPUNCTURE: CPT | Performed by: INTERNAL MEDICINE

## 2019-07-24 PROCEDURE — 25000566 ZZH SEVOFLURANE, EA 15 MIN: Performed by: INTERNAL MEDICINE

## 2019-07-24 PROCEDURE — 25000128 H RX IP 250 OP 636: Performed by: NURSE ANESTHETIST, CERTIFIED REGISTERED

## 2019-07-24 PROCEDURE — 93005 ELECTROCARDIOGRAM TRACING: CPT

## 2019-07-24 PROCEDURE — 88172 CYTP DX EVAL FNA 1ST EA SITE: CPT | Performed by: INTERNAL MEDICINE

## 2019-07-24 PROCEDURE — 36000058 ZZH SURGERY LEVEL 3 EA 15 ADDTL MIN: Performed by: INTERNAL MEDICINE

## 2019-07-24 PROCEDURE — 37000008 ZZH ANESTHESIA TECHNICAL FEE, 1ST 30 MIN: Performed by: INTERNAL MEDICINE

## 2019-07-24 PROCEDURE — 88112 CYTOPATH CELL ENHANCE TECH: CPT | Mod: 26 | Performed by: INTERNAL MEDICINE

## 2019-07-24 PROCEDURE — 25500064 ZZH RX 255 OP 636: Performed by: INTERNAL MEDICINE

## 2019-07-24 PROCEDURE — 88177 CYTP FNA EVAL EA ADDL: CPT | Performed by: INTERNAL MEDICINE

## 2019-07-24 PROCEDURE — 88173 CYTOPATH EVAL FNA REPORT: CPT | Performed by: INTERNAL MEDICINE

## 2019-07-24 PROCEDURE — 88172 CYTP DX EVAL FNA 1ST EA SITE: CPT | Mod: 26 | Performed by: INTERNAL MEDICINE

## 2019-07-24 PROCEDURE — C1769 GUIDE WIRE: HCPCS | Performed by: INTERNAL MEDICINE

## 2019-07-24 PROCEDURE — C2617 STENT, NON-COR, TEM W/O DEL: HCPCS | Performed by: INTERNAL MEDICINE

## 2019-07-24 PROCEDURE — 74330 X-RAY BILE/PANC ENDOSCOPY: CPT

## 2019-07-24 PROCEDURE — 40000065 ZZH STATISTIC EKG NON-CHARGEABLE

## 2019-07-24 PROCEDURE — 40000170 ZZH STATISTIC PRE-PROCEDURE ASSESSMENT II: Performed by: INTERNAL MEDICINE

## 2019-07-24 PROCEDURE — 37000009 ZZH ANESTHESIA TECHNICAL FEE, EACH ADDTL 15 MIN: Performed by: INTERNAL MEDICINE

## 2019-07-24 DEVICE — IMPLANTABLE DEVICE: Type: IMPLANTABLE DEVICE | Site: BILE DUCT | Status: FUNCTIONAL

## 2019-07-24 RX ORDER — ONDANSETRON 2 MG/ML
INJECTION INTRAMUSCULAR; INTRAVENOUS PRN
Status: DISCONTINUED | OUTPATIENT
Start: 2019-07-24 | End: 2019-07-24

## 2019-07-24 RX ORDER — FENTANYL CITRATE 50 UG/ML
INJECTION, SOLUTION INTRAMUSCULAR; INTRAVENOUS PRN
Status: DISCONTINUED | OUTPATIENT
Start: 2019-07-24 | End: 2019-07-24

## 2019-07-24 RX ORDER — LIDOCAINE 40 MG/G
CREAM TOPICAL
Status: DISCONTINUED | OUTPATIENT
Start: 2019-07-24 | End: 2019-07-24 | Stop reason: HOSPADM

## 2019-07-24 RX ORDER — PROPOFOL 10 MG/ML
INJECTION, EMULSION INTRAVENOUS PRN
Status: DISCONTINUED | OUTPATIENT
Start: 2019-07-24 | End: 2019-07-24

## 2019-07-24 RX ORDER — INDOMETHACIN 50 MG/1
100 SUPPOSITORY RECTAL
Status: DISCONTINUED | OUTPATIENT
Start: 2019-07-24 | End: 2019-07-24 | Stop reason: HOSPADM

## 2019-07-24 RX ORDER — LORATADINE 10 MG/1
10 TABLET ORAL DAILY PRN
COMMUNITY
End: 2019-08-08

## 2019-07-24 RX ORDER — LIDOCAINE HYDROCHLORIDE 20 MG/ML
INJECTION, SOLUTION INFILTRATION; PERINEURAL PRN
Status: DISCONTINUED | OUTPATIENT
Start: 2019-07-24 | End: 2019-07-24

## 2019-07-24 RX ORDER — NALOXONE HYDROCHLORIDE 0.4 MG/ML
.1-.4 INJECTION, SOLUTION INTRAMUSCULAR; INTRAVENOUS; SUBCUTANEOUS
Status: DISCONTINUED | OUTPATIENT
Start: 2019-07-24 | End: 2019-07-24 | Stop reason: HOSPADM

## 2019-07-24 RX ORDER — LORAZEPAM 1 MG/1
1 TABLET ORAL EVERY 6 HOURS PRN
Qty: 20 TABLET | Refills: 0 | Status: SHIPPED | OUTPATIENT
Start: 2019-07-24 | End: 2019-08-08

## 2019-07-24 RX ORDER — DEXAMETHASONE SODIUM PHOSPHATE 4 MG/ML
INJECTION, SOLUTION INTRA-ARTICULAR; INTRALESIONAL; INTRAMUSCULAR; INTRAVENOUS; SOFT TISSUE PRN
Status: DISCONTINUED | OUTPATIENT
Start: 2019-07-24 | End: 2019-07-24

## 2019-07-24 RX ORDER — FLUMAZENIL 0.1 MG/ML
0.2 INJECTION, SOLUTION INTRAVENOUS
Status: DISCONTINUED | OUTPATIENT
Start: 2019-07-24 | End: 2019-07-24 | Stop reason: HOSPADM

## 2019-07-24 RX ORDER — SODIUM CHLORIDE, SODIUM LACTATE, POTASSIUM CHLORIDE, CALCIUM CHLORIDE 600; 310; 30; 20 MG/100ML; MG/100ML; MG/100ML; MG/100ML
INJECTION, SOLUTION INTRAVENOUS CONTINUOUS PRN
Status: DISCONTINUED | OUTPATIENT
Start: 2019-07-24 | End: 2019-07-24

## 2019-07-24 RX ADMIN — PROPOFOL 150 MG: 10 INJECTION, EMULSION INTRAVENOUS at 15:10

## 2019-07-24 RX ADMIN — LIDOCAINE HYDROCHLORIDE 60 MG: 20 INJECTION, SOLUTION INFILTRATION; PERINEURAL at 15:10

## 2019-07-24 RX ADMIN — FENTANYL CITRATE 100 MCG: 50 INJECTION, SOLUTION INTRAMUSCULAR; INTRAVENOUS at 15:10

## 2019-07-24 RX ADMIN — ONDANSETRON 4 MG: 2 INJECTION INTRAMUSCULAR; INTRAVENOUS at 15:14

## 2019-07-24 RX ADMIN — DEXAMETHASONE SODIUM PHOSPHATE 4 MG: 4 INJECTION, SOLUTION INTRA-ARTICULAR; INTRALESIONAL; INTRAMUSCULAR; INTRAVENOUS; SOFT TISSUE at 15:14

## 2019-07-24 RX ADMIN — SODIUM CHLORIDE, POTASSIUM CHLORIDE, SODIUM LACTATE AND CALCIUM CHLORIDE: 600; 310; 30; 20 INJECTION, SOLUTION INTRAVENOUS at 15:06

## 2019-07-24 RX ADMIN — MIDAZOLAM 2 MG: 1 INJECTION INTRAMUSCULAR; INTRAVENOUS at 15:06

## 2019-07-24 RX ADMIN — SUCCINYLCHOLINE CHLORIDE 100 MG: 20 INJECTION, SOLUTION INTRAMUSCULAR; INTRAVENOUS; PARENTERAL at 15:10

## 2019-07-24 ASSESSMENT — MIFFLIN-ST. JEOR: SCORE: 1300.2

## 2019-07-24 NOTE — TELEPHONE ENCOUNTER
Patient was discharged from [facility] on 07/21/2019 after being treated for Choledocholithiasis. Triage please follow up with patient.      .Yaneli MCGARRY    [facility] [place]

## 2019-07-24 NOTE — ANESTHESIA POSTPROCEDURE EVALUATION
Patient: Mckayla Grady    Procedure(s):  ENDOSCOPIC ULTRASOUND, ESOPHAGOSCOPY / UPPER GASTROINTESTINAL TRACT, STENT REMOVAL, AND FNA / BIOPIES OF AMPULA  MASS  ENDOSCOPIC RETROGRADE CHOLANGIOPANCREATOGRAPHY, COMMON BILE DUCT BRUSHINGS AND 10f/7CM STENT PLACEMENT X 2 STENTS, BIOPSIES OF AMPULA  MASS AND    Diagnosis:COMMON BILE DUCT MASS  Diagnosis Additional Information: No value filed.    Anesthesia Type:  General, RSI, ETT    Note:  Anesthesia Post Evaluation    Patient location during evaluation: PACU  Patient participation: Able to fully participate in evaluation  Level of consciousness: awake  Pain management: adequate  Airway patency: patent  Cardiovascular status: acceptable  Respiratory status: acceptable  Hydration status: acceptable  PONV: none     Anesthetic complications: None          Last vitals:  Vitals:    07/24/19 1700 07/24/19 1715 07/24/19 1725   BP: 114/63 117/55    Pulse: 52 52    Resp: 10 11    Temp:      SpO2: 97% 91% 96%         Electronically Signed By: Jorge Richmond MD  July 24, 2019  6:07 PM

## 2019-07-24 NOTE — TELEPHONE ENCOUNTER
ED / Discharge Outreach Protocol    Patient Contact    Attempt # 1    Was call answered?  No.  Left non-detailed message to call the clinic back at [phone] and ask to speak with a  triage nurse.   Brittanie Jiang RN

## 2019-07-24 NOTE — ANESTHESIA CARE TRANSFER NOTE
Patient: Mckayla Grady    Procedure(s):  ENDOSCOPIC ULTRASOUND, ESOPHAGOSCOPY / UPPER GASTROINTESTINAL TRACT, STENT REMOVAL, AND FNA / BIOPIES OF AMPULA  MASS  ENDOSCOPIC RETROGRADE CHOLANGIOPANCREATOGRAPHY    Diagnosis: COMMON BILE DUCT MASS  Diagnosis Additional Information: No value filed.    Anesthesia Type:   No value filed.     Note:  Airway :Face Mask  Patient transferred to:PACU  Comments: Neuromuscular blockade not used after succinylcholine for intubation, spontaneous return of TOF 4/4 with sustained tetany, spontaneous respirations, adequate tidal volumes, followed commands to voice, oropharynx suctioned with soft flexible catheter, extubated atraumatically, extubated with suction, airway patent after extubation.  Oxygen via facemask at 6 liters per minute to PACU. Oxygen tubing connected to wall O2 in PACU, SpO2, NiBP, and EKG monitors and alarms on and functioning, Kimberly Hugger warmer connected to patient gown, report on patient's clinical status given to PACU RN, RN questions answered. Handoff Report: Identifed the Patient, Identified the Reponsible Provider, Reviewed the pertinent medical history, Discussed the surgical course, Reviewed Intra-OP anesthesia mangement and issues during anesthesia, Set expectations for post-procedure period and Allowed opportunity for questions and acknowledgement of understanding      Vitals: (Last set prior to Anesthesia Care Transfer)    CRNA VITALS  7/24/2019 1556 - 7/24/2019 1632      7/24/2019             Resp Rate (observed):  17    Resp Rate (set):  10                Electronically Signed By: CLAIR Lewis CRNA  July 24, 2019  4:32 PM

## 2019-07-24 NOTE — DISCHARGE INSTRUCTIONS
Same Day Surgery Discharge Instructions for  Sedation and General Anesthesia       It's not unusual to feel dizzy, light-headed or faint for up to 24 hours after surgery or while taking pain medication.  If you have these symptoms: sit for a few minutes before standing and have someone assist you when you get up to walk or use the bathroom.      You should rest and relax for the next 24 hours. We recommend you make arrangements to have an adult stay with you for at least 24 hours after your discharge.  Avoid hazardous and strenuous activity.      DO NOT DRIVE any vehicle or operate mechanical equipment for 24 hours following the end of your surgery.  Even though you may feel normal, your reactions may be affected by the medication you have received.      Do not drink alcoholic beverages for 24 hours following surgery.       Slowly progress to your regular diet as you feel able. It's not unusual to feel nauseated and/or vomit after receiving anesthesia.  If you develop these symptoms, drink clear liquids (apple juice, ginger ale, broth, 7-up, etc. ) until you feel better.  If your nausea and vomiting persists for 24 hours, please notify your surgeon.        All narcotic pain medications, along with inactivity and anesthesia, can cause constipation. Drinking plenty of liquids and increasing fiber intake will help.      For any questions of a medical nature, call your surgeon.      Do not make important decisions for 24 hours.      If you had general anesthesia, you may have a sore throat for a couple of days related to the breathing tube used during surgery.  You may use Cepacol lozenges to help with this discomfort.  If it worsens or if you develop a fever, contact your surgeon.       If you feel your pain is not well managed with the pain medications prescribed by your surgeon, please contact your surgeon's office to let them know so they can address your concerns.       **If you have questions or concerns about  your procedure,   call Dr. Leahy at [phone]**

## 2019-07-24 NOTE — ANESTHESIA PREPROCEDURE EVALUATION
Anesthesia Pre-Procedure Evaluation    Patient: Mckayla Grady   MRN: [id number] : 1955          Preoperative Diagnosis: COMMON BILE DUCT MASS    Procedure(s):  ENDOSCOPIC ULTRASOUND, ESOPHAGOSCOPY / UPPER GASTROINTESTINAL TRACT, STENT REMOVAL, AND FNA / BIOPIES OF AMPULA  MASS  ENDOSCOPIC RETROGRADE CHOLANGIOPANCREATOGRAPHY, COMMON BILE DUCT BRUSHINGS AND 10f/7CM STENT PLACEMENT X 2 STENTS, BIOPSIES OF AMPULA  MASS AND    Past Medical History:   Diagnosis Date     Esophageal reflux      Hallux rigidus     bilatateral     Insomnia, unspecified      Numbness and tingling     localized to LLE; > TOP OF FOOT & CALF AREAS     Past Surgical History:   Procedure Laterality Date     APPENDECTOMY       C EXPLORE/TREAT ANKLE JOINT       C TOTAL ABDOM HYSTERECTOMY      with bilat ovary removal     COLONOSCOPY  2009     COLONOSCOPY  2012    Repeat in 5 years     DISCECTOMY LUMBAR POSTERIOR MICROSCOPIC ONE LEVEL Right 2015    Procedure: DISCECTOMY LUMBAR POSTERIOR MICROSCOPIC ONE LEVEL;  Surgeon: Román Smith MD;  Location:  OR     DISKECTOMY, LUMBAR, SINGLE SP  2001    X 2     DISKECTOMY, LUMBAR, SINGLE SP  2006      HC REVISE MEDIAN N/CARPAL TUNNEL SURG  2002     HYSTERECTOMY, PAP NO LONGER INDICATED       OPTICAL TRACKING SYSTEM FUSION SPINE POSTERIOR LUMBAR TWO LEVELS N/A 2016    Procedure: OPTICAL TRACKING SYSTEM FUSION SPINE POSTERIOR LUMBAR TWO LEVELS;  Surgeon: Román Smith MD;  Location:  OR     ORTHOPEDIC SURGERY  2015    GT TOE JOINT FUSION - BILATERAL     SURGICAL HISTORY OF -       C-secs x3     SURGICAL HISTORY OF -       laparoscopy benign enlarged lymph node     SURGICAL HISTORY OF -       appendectomy       Anesthesia Evaluation     .             ROS/MED HX    ENT/Pulmonary: Comment: Albuterol PRN,     (+)Intermittent asthma , . .   (-) sleep apnea   Neurologic: Comment: insomnia      Cardiovascular:  - neg cardiovascular ROS      "  METS/Exercise Tolerance:     Hematologic:         Musculoskeletal: Comment: DJD  LBP with radiculopathy        GI/Hepatic: Comment: Biliary mass s/p stent    (+) GERD       Renal/Genitourinary:     (+) Nephrolithiasis ,       Endo:         Psychiatric:     (+) psychiatric history anxiety      Infectious Disease:         Malignancy:         Other:                          Physical Exam  Normal systems: dental    Airway   Mallampati: II  TM distance: >3 FB  Neck ROM: full    Dental     Cardiovascular   Rhythm and rate: regular and normal      Pulmonary    breath sounds clear to auscultation            Lab Results   Component Value Date    WBC 7.4 07/19/2019    HGB 12.8 07/19/2019    HCT 37.1 07/19/2019     07/19/2019    SED 16 07/10/2019     07/21/2019    POTASSIUM 3.7 07/21/2019    CHLORIDE 107 07/21/2019    CO2 28 07/21/2019    BUN 9 07/21/2019    CR 0.68 07/21/2019     (H) 07/21/2019    CARMEN 8.8 07/21/2019    ALBUMIN 3.3 (L) 07/24/2019    PROTTOTAL 6.9 07/24/2019    ALT 51 (H) 07/24/2019    AST 48 (H) 07/24/2019    ALKPHOS 384 (H) 07/24/2019    BILITOTAL 7.3 (H) 07/24/2019    LIPASE 193 07/21/2019    INR 0.96 07/20/2019    TSH 1.85 07/10/2019    T4 1.09 07/12/2016       Preop Vitals  BP Readings from Last 3 Encounters:   07/24/19 117/55   07/21/19 134/60   07/10/19 130/72    Pulse Readings from Last 3 Encounters:   07/24/19 52   07/20/19 57   07/10/19 71      Resp Readings from Last 3 Encounters:   07/24/19 11   07/21/19 16   07/05/19 16    SpO2 Readings from Last 3 Encounters:   07/24/19 96%   07/21/19 93%   07/10/19 97%      Temp Readings from Last 1 Encounters:   07/24/19 36.4  C (97.5  F) (Temporal)    Ht Readings from Last 1 Encounters:   07/24/19 1.6 m (5' 3\")      Wt Readings from Last 1 Encounters:   07/24/19 79.1 kg (174 lb 6.4 oz)    Estimated body mass index is 30.89 kg/m  as calculated from the following:    Height as of this encounter: 1.6 m (5' 3\").    Weight as of this encounter: " 79.1 kg (174 lb 6.4 oz).       Anesthesia Plan      History & Physical Review  History and physical reviewed and following examination; no interval change.    ASA Status:  2 .    NPO Status:  > 8 hours    Plan for General, RSI and ETT with Intravenous and Propofol induction. Maintenance will be Balanced.    PONV prophylaxis:  Ondansetron (or other 5HT-3) and Dexamethasone or Solumedrol       Postoperative Care      Consents  Anesthetic plan, risks, benefits and alternatives discussed with:  Patient..                 Jorge Rcihmond MD

## 2019-07-24 NOTE — OR NURSING
Procedure in OR 30 with Dr Leahy EUS with FNA of ampula, 3 specimens sent to lab via OR RN. Labeling confirmed by ENDO RNs. ERCP with stent removal, 2 stents placed. Biopsies, cytology brushing, cholangiogram. 2 specimens labeled and sent to lab via OR RN. Labeling confirmed with ENDO RNs.

## 2019-07-24 NOTE — TELEPHONE ENCOUNTER
Reason for Call:   medication refill:    Do you use a [facility]?  Name of the pharmacy and phone number for the current request:   at [facility]    Name of the medication requested:  LORazepam (ATIVAN) 1 MG tablet    Plz call her  when ready, Pt will be at a 's appt. Maybe fining out that she has cancer at this appt.     Can we leave a detailed message on this number?   Yes     Phone number patient can be reached at: Other phone number:  [phone] Yossi Grady    Best Time: any    Call taken on 7/24/2019 at 11:19 AM by Loly Correa

## 2019-07-24 NOTE — TELEPHONE ENCOUNTER
ativan      Last Written Prescription Date:  7/10/19  Last Fill Quantity: 20,   # refills: 0  Last Office Visit: 7/10/19  Future Office visit:       Routing refill request to provider for review/approval because:  Drug not on the [facility], UMP or  Health refill protocol or controlled substance    RX monitoring program ([place]) reviewed:  reviewed- no concerns    [place]:  [email or web address]    Trinh Rivera RN   [facility] - Triage

## 2019-07-25 ENCOUNTER — TELEPHONE (OUTPATIENT)
Dept: FAMILY MEDICINE | Facility: CLINIC | Age: 64
End: 2019-07-25

## 2019-07-25 NOTE — TELEPHONE ENCOUNTER
Patient was discharged from [facility] on 07/21/2019 after being treated for Choledocholithiasis. Triage please follow up with patient.        .Yaneli MCGARRY    [facility] [place]

## 2019-07-25 NOTE — TELEPHONE ENCOUNTER
"Patient would like to thank Gael Jacobson CNP for being a wonderful provider and starting the process of being diagnosed with pancreatic cancer. Routing to JODI DONG as FYI.       ED/Discharge Protocol    \"Hi, my name is Judah Rivera, a registered nurse, and I am calling on behalf of Dr. Jacobson's office at [place].  I am calling to follow up and see how things are going for you after your recent visit.\"    \"I see that you were in the (ER/UC/IP) on 7/19/19 and 7/24/19.    How are you doing now that you are home?\" doing okay, dx'd with pancreatic cancer. Will be getting a whipple and getting referred to [place] for treatment.     Is patient experiencing symptoms that may require a hospital visit?  no    Discharge Instructions    \"Let's review your discharge instructions.  What is/are the follow-up recommendations?  Pt. Response: f/u with oncology per above    \"Were you instructed to make a follow-up appointment?\"  Pt. Response: No.       \"When you see the provider, I would recommend that you bring your discharge instructions with you.    Medications    \"How many new medications are you on since your hospitalization/ED visit?\"    0-1  \"How many of your current medicines changed (dose, timing, name, etc.) while you were in the hospital/ED visit?\"   0-1  \"Do you have questions about your medications?\"   No  \"Were you newly diagnosed with heart failure, COPD, diabetes or did you have a heart attack?\"   No  For patients on insulin: \"Did you start on insulin in the hospital or did you have your insulin dose changed?\"   No    Medication reconciliation completed? Yes  Post Discharge Medication Reconciliation Status: discharge medications reconciled, continue medications without change.    Was MTM referral placed (*Make sure to put transitions as reason for referral)?   No    Call Summary    \"Do you have any questions or concerns about your condition or care plan at the moment?\"    No      \"If you have questions or " "things don't continue to improve, we encourage you contact us through the main clinic number,  [phone].  Even if the clinic is not open, triage nurses are available 24/7 to help you.     We would like you to know that our clinic has extended hours (provide information).  We also have urgent care (provide details on closest location and hours/contact info)\"      \"Thank you for your time and take care!\"      Trinh Rivera RN   [facility] - Triage     "

## 2019-07-25 NOTE — TELEPHONE ENCOUNTER
ED / Discharge Outreach Protocol    Patient Contact    Attempt # 2    Was call answered?  No.  Left message on voicemail with information to call me back.    There is another encounter already started.  This is the second attempt.    FRANKO Louie, RN  [employer] Triage

## 2019-07-26 LAB
COPATH REPORT: NORMAL
INTERPRETATION ECG - MUSE: NORMAL

## 2019-07-29 LAB — COPATH REPORT: NORMAL

## 2019-08-08 ENCOUNTER — OFFICE VISIT (OUTPATIENT)
Dept: FAMILY MEDICINE | Facility: CLINIC | Age: 64
End: 2019-08-08
Payer: COMMERCIAL

## 2019-08-08 VITALS
HEART RATE: 69 BPM | DIASTOLIC BLOOD PRESSURE: 62 MMHG | TEMPERATURE: 98 F | SYSTOLIC BLOOD PRESSURE: 108 MMHG | OXYGEN SATURATION: 98 % | WEIGHT: 166.8 LBS | BODY MASS INDEX: 29.55 KG/M2

## 2019-08-08 DIAGNOSIS — F41.9 ANXIETY: ICD-10-CM

## 2019-08-08 DIAGNOSIS — R79.89 ELEVATED LFTS: ICD-10-CM

## 2019-08-08 DIAGNOSIS — Z01.818 PREOP GENERAL PHYSICAL EXAM: Primary | ICD-10-CM

## 2019-08-08 DIAGNOSIS — C24.1 AMPULLARY CARCINOMA (H): ICD-10-CM

## 2019-08-08 PROBLEM — K80.50 CHOLEDOCHOLITHIASIS: Status: RESOLVED | Noted: 2019-07-19 | Resolved: 2019-08-08

## 2019-08-08 PROBLEM — R03.0 ELEVATED BLOOD PRESSURE READING WITHOUT DIAGNOSIS OF HYPERTENSION: Status: RESOLVED | Noted: 2017-05-19 | Resolved: 2019-08-08

## 2019-08-08 LAB
ALBUMIN SERPL-MCNC: 3.3 G/DL (ref 3.4–5)
ALP SERPL-CCNC: 296 U/L (ref 40–150)
ALT SERPL W P-5'-P-CCNC: 411 U/L (ref 0–50)
AST SERPL W P-5'-P-CCNC: 227 U/L (ref 0–45)
BILIRUB DIRECT SERPL-MCNC: 3.8 MG/DL (ref 0–0.2)
BILIRUB SERPL-MCNC: 4.9 MG/DL (ref 0.2–1.3)
PROT SERPL-MCNC: 6.8 G/DL (ref 6.8–8.8)

## 2019-08-08 PROCEDURE — 99214 OFFICE O/P EST MOD 30 MIN: CPT | Performed by: INTERNAL MEDICINE

## 2019-08-08 PROCEDURE — 80076 HEPATIC FUNCTION PANEL: CPT | Performed by: INTERNAL MEDICINE

## 2019-08-08 PROCEDURE — 36415 COLL VENOUS BLD VENIPUNCTURE: CPT | Performed by: INTERNAL MEDICINE

## 2019-08-08 RX ORDER — LORAZEPAM 1 MG/1
1-2 TABLET ORAL EVERY 6 HOURS PRN
Qty: 30 TABLET | Refills: 0 | Status: SHIPPED | OUTPATIENT
Start: 2019-08-08 | End: 2019-08-26

## 2019-08-08 RX ORDER — LORAZEPAM 1 MG/1
1 TABLET ORAL EVERY 6 HOURS PRN
Qty: 20 TABLET | Refills: 0 | Status: CANCELLED | OUTPATIENT
Start: 2019-08-08

## 2019-08-08 ASSESSMENT — ANXIETY QUESTIONNAIRES
1. FEELING NERVOUS, ANXIOUS, OR ON EDGE: NEARLY EVERY DAY
7. FEELING AFRAID AS IF SOMETHING AWFUL MIGHT HAPPEN: NEARLY EVERY DAY
GAD7 TOTAL SCORE: 17
5. BEING SO RESTLESS THAT IT IS HARD TO SIT STILL: NOT AT ALL
3. WORRYING TOO MUCH ABOUT DIFFERENT THINGS: NEARLY EVERY DAY
IF YOU CHECKED OFF ANY PROBLEMS ON THIS QUESTIONNAIRE, HOW DIFFICULT HAVE THESE PROBLEMS MADE IT FOR YOU TO DO YOUR WORK, TAKE CARE OF THINGS AT HOME, OR GET ALONG WITH OTHER PEOPLE: VERY DIFFICULT
2. NOT BEING ABLE TO STOP OR CONTROL WORRYING: NEARLY EVERY DAY
6. BECOMING EASILY ANNOYED OR IRRITABLE: MORE THAN HALF THE DAYS

## 2019-08-08 ASSESSMENT — PATIENT HEALTH QUESTIONNAIRE - PHQ9
5. POOR APPETITE OR OVEREATING: NEARLY EVERY DAY
SUM OF ALL RESPONSES TO PHQ QUESTIONS 1-9: 15

## 2019-08-08 NOTE — PROGRESS NOTES
[facility]  [address] [address]  [phone]  Dept: [phone]    PRE-OP EVALUATION:  Today's date: 2019    Mckayla Grady (: 1955) presents for pre-operative evaluation assessment as requested by Dr. Christie.  She requires evaluation and anesthesia risk assessment prior to undergoing surgery/procedure for treatment of ampullary adenoma and high grade dysplasia.    Proposed Surgery/ Procedure: Whipple procedure   Date of Surgery/ Procedure: 19  Time of Surgery/ Procedure: 9:00 a.m.  Hospital/Surgical Facility: [facility]   Fax number for surgical facility:   Primary Physician: [facility]  Type of Anesthesia Anticipated: General    Patient has a Health Care Directive or Living Will:  NO    1. NO - Do you have a history of heart attack, stroke, stent, bypass or surgery on an artery in the head, neck, heart or legs?  2. YES - DO YOU EVER HAVE ANY PAIN OR DISCOMFORT IN YOUR CHEST? heartburn  3. NO - Do you have a history of  Heart Failure?  4. NO - Are you troubled by shortness of breath when: walking on the level, up a slight hill or at night?  5. NO - Do you currently have a cold, bronchitis or other respiratory infection?  6. NO - Do you have a cough, shortness of breath or wheezing?  7. NO - Do you sometimes get pains in the calves of your legs when you walk?  8. NO - Do you or anyone in your family have previous history of blood clots?  9. NO - Do you or does anyone in your family have a serious bleeding problem such as prolonged bleeding following surgeries or cuts?  10. YES - HAVE YOU EVER HAD PROBLEMS WITH ANEMIA OR BEEN TOLD TO TAKE IRON PILLS? In past   11. NO - Have you had any abnormal blood loss such as black, tarry or bloody stools, or abnormal vaginal bleeding?  12. NO - Have you ever had a blood transfusion?  13. NO - Have you or any of your relatives ever had problems  with anesthesia?  14. NO - Do you have sleep apnea, excessive snoring or daytime drowsiness?  15. NO - Do you have any prosthetic heart valves?  16. NO - Do you have prosthetic joints?  17. NO - Is there any chance that you may be pregnant?      HPI:     HPI related to upcoming procedure: Mckayla had been sufferring from diffuse pruritis.  She had labs done revealing elevated bilirubin and alk phos.  She has undergone multiple imaging tests and ERCP and stent placements.  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 She is undergoing whipple procedure, which will hopefully be curative.     She is feeling okay, pretty anxious.  Appetite is so-so.  She has some constipation and abdominal bloating.  She denies any cardiopulmonary symptoms.  No recent illnesses.  Her itching is pretty mild.    See problem list for active medical problems.  Problems all longstanding and stable, except as noted/documented.  See ROS for pertinent symptoms related to these conditions.      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 Provider to review       S/P complete hysterectomy 01/06/2015     Priority: Medium     paps no longer indicated       Overweight 10/21/2014     Priority: Medium     Problem list name updated by automated process. Provider to review       DDD (degenerative disc disease), lumbar 01/08/2014     Priority: Medium     Central spinal stenosis 01/08/2014     Priority: Medium     Family history of colon cancer 05/07/2009     Priority: Medium     Colon polyps 05/07/2009     Priority: Medium     Adenomatous polyps       Esophageal reflux 06/16/2006     Priority: Medium     Calculus of kidney 10/05/2005     Priority: Medium       Past Medical History:   Diagnosis Date     Esophageal reflux      Hallux rigidus     bilatateral     Insomnia, unspecified      Numbness and tingling     localized to LLE; > TOP OF FOOT & CALF AREAS     Past Surgical History:   Procedure Laterality Date     APPENDECTOMY       C EXPLORE/TREAT ANKLE JOINT  1989     C TOTAL ABDOM HYSTERECTOMY  2002    with bilat ovary removal     COLONOSCOPY  4/2009     COLONOSCOPY  4/2012    Repeat in 5 years     DISCECTOMY LUMBAR POSTERIOR MICROSCOPIC ONE LEVEL Right 1/8/2015    Procedure: DISCECTOMY LUMBAR POSTERIOR MICROSCOPIC ONE LEVEL;  Surgeon: Román Smith MD;  Location:  OR     DISKECTOMY, LUMBAR, SINGLE SP  2001    X 2     DISKECTOMY, LUMBAR, SINGLE SP  2006      ENDOSCOPIC RETROGRADE CHOLANGIOPANCREATOGRAM N/A 7/20/2019    Procedure: ENDOSCOPIC RETROGRADE CHOLANGIOPANCREATPGRAPHY, SPINCTEROTOMY, BALLOON SWEEP, AMPILLARY BIOPSY, STENT PLACEMENT, SPHINCTEROTOMY;  Surgeon: Hansel Tatum MD;  Location:  OR     ENDOSCOPIC RETROGRADE CHOLANGIOPANCREATOGRAM N/A 7/24/2019    Procedure: ENDOSCOPIC RETROGRADE CHOLANGIOPANCREATOGRAPHY, COMMON BILE DUCT BRUSHINGS AND 10f/7CM STENT PLACEMENT X 2 STENTS, BIOPSIES OF AMPULA  MASS AND;  Surgeon: Michelle Leahy MD;  Location:  OR     ENDOSCOPIC ULTRASOUND UPPER GASTROINTESTINAL TRACT (GI) N/A 7/24/2019    Procedure: ENDOSCOPIC ULTRASOUND, ESOPHAGOSCOPY / UPPER GASTROINTESTINAL TRACT, STENT REMOVAL, AND FNA / BIOPIES OF AMPULA  MASS;  Surgeon: Michelle Leahy MD;  Location:  OR      REVISE MEDIAN N/CARPAL TUNNEL SURG  2002     HYSTERECTOMY, PAP NO LONGER INDICATED       OPTICAL TRACKING SYSTEM FUSION SPINE POSTERIOR LUMBAR TWO LEVELS N/A 8/4/2016    Procedure: OPTICAL TRACKING SYSTEM FUSION SPINE POSTERIOR LUMBAR TWO LEVELS;  Surgeon: Román Smith MD;  Location:  OR     ORTHOPEDIC SURGERY  9/2015    GT TOE JOINT FUSION - BILATERAL     SURGICAL HISTORY OF -       C-secs x3      SURGICAL HISTORY OF -   1976    laparoscopy benign enlarged lymph node     SURGICAL HISTORY OF -   1963    appendectomy     Current Outpatient Medications   Medication Sig Dispense Refill     albuterol (PROAIR HFA/PROVENTIL HFA/VENTOLIN HFA) 108 (90 Base) MCG/ACT inhaler Inhale 2 puffs into the lungs every 4 hours as needed for shortness of breath / dyspnea or wheezing 1 Inhaler 3     cholestyramine (QUESTRAN) 4 g packet Take 1 packet (4 g) by mouth 2 times daily as needed (itching) 14 packet 0     docusate sodium (COLACE) 100 MG capsule Take 1 capsule (100 mg) by mouth 2 times daily as needed for constipation 14 capsule 0     hydrOXYzine (ATARAX) 25 MG tablet Take 1-2 tablets (25-50 mg) by mouth 3 times daily as needed for itching 60 tablet 3     LORazepam (ATIVAN) 1 MG tablet Take 1-2 tablets (1-2 mg) by mouth every 6 hours as needed for anxiety 30 tablet 0     multivitamin w/minerals (THERA-VIT-M) tablet Take 1 tablet by mouth daily       traZODone (DESYREL) 50 MG tablet TAKE 1-2 TABLETS BY MOUTH BEFORE BED FOR SLEEP AS NEEDED 90 tablet 1     gabapentin (NEURONTIN) 300 MG capsule Take 2 capsules (600 mg) by mouth At Bedtime (Patient not taking: Reported on 8/8/2019) 180 capsule 3     omeprazole (PRILOSEC OTC) 20 MG tablet Take 20 mg by mouth as needed        OTC products: nighttime supplement     Allergies   Allergen Reactions     Ampicillin      GI upset      Latex Allergy: NO    Social History     Tobacco Use     Smoking status: Never Smoker     Smokeless tobacco: Never Used   Substance Use Topics     Alcohol use: Not Currently     Alcohol/week: 0.0 oz     Comment: 6/week     History   Drug Use No       REVIEW OF SYSTEMS:   Const, heent, cv, pulm, gi, heme, skin, psych reviewed,  otherwise negative unless noted above.      EXAM:   /62 (BP Location: Left arm, Patient Position: Chair, Cuff Size: Adult Regular)   Pulse 69   Temp 98  F (36.7  C) (Oral)   Wt 75.7 kg (166 lb 12.8 oz)   LMP 05/21/2002    SpO2 98%   BMI 29.55 kg/m       Gen: well appearing, pleasant woman, no distress  HEENT: PERRL, + scleral icterus, MMM  Neck: supple, no LAD  Pulm: breathing comfortably, CTAB, no wheezes or rales  CV: RRR, normal S1 and S2, no murmurs  Abd: BS present, soft, nontender, nondistended  Ext: 2+ distal pulses, no LE edema       DIAGNOSTICS:     Results for orders placed or performed in visit on 08/08/19   Hepatic panel (Albumin, ALT, AST, Bili, Alk Phos, TP)   Result Value Ref Range    Bilirubin Direct 3.8 (H) 0.0 - 0.2 mg/dL    Bilirubin Total 4.9 (H) 0.2 - 1.3 mg/dL    Albumin 3.3 (L) 3.4 - 5.0 g/dL    Protein Total 6.8 6.8 - 8.8 g/dL    Alkaline Phosphatase 296 (H) 40 - 150 U/L     (H) 0 - 50 U/L     (H) 0 - 45 U/L        EKG 7/24/2019 - sinus bradycardia, otherwise normal     Recent Labs   Lab Test 07/21/19  0701 07/20/19  0839 07/20/19  0645 07/19/19  1649 07/10/19  0830   HGB  --   --   --  12.8 13.2   PLT  --   --   --  253 257   INR  --  0.96  --   --   --      --  143 141 141   POTASSIUM 3.7  --  3.7 3.8 3.8   CR 0.68  --  0.67 0.71 0.63        IMPRESSION:   Reason for surgery/procedure: ampullary high grade dysplasia   Diagnosis/reason for consult: pre-op eval     The proposed surgical procedure is considered HIGH risk.    REVISED CARDIAC RISK INDEX  The patient has the following serious cardiovascular risks for perioperative complications such as (MI, PE, VFib and 3  AV Block):  High risk surgery (>5% cardiac complication risk)  INTERPRETATION: 1 risks: Class II (low risk - 0.9% complication rate)    The patient has the following additional risks for perioperative complications:  No identified additional risks      ICD-10-CM    1. Preop general physical exam Z01.818    2. Ampullary carcinoma (H) C24.1    3. Elevated LFTs R94.5 Hepatic panel (Albumin, ALT, AST, Bili, Alk Phos, TP)   4. Anxiety F41.9        RECOMMENDATIONS:             APPROVAL GIVEN to proceed with proposed procedure,  without further diagnostic evaluation       Signed Electronically by: Jennifer Vargas MD    Copy of this evaluation report is provided to requesting physician.    [place] Preop Guidelines    Revised Cardiac Risk Index

## 2019-08-09 ASSESSMENT — ANXIETY QUESTIONNAIRES: GAD7 TOTAL SCORE: 17

## 2019-08-14 ENCOUNTER — TRANSFERRED RECORDS (OUTPATIENT)
Dept: HEALTH INFORMATION MANAGEMENT | Facility: CLINIC | Age: 64
End: 2019-08-14

## 2019-08-18 LAB
ALT SERPL-CCNC: 64 IU/L (ref 8–45)
AST SERPL-CCNC: 51 IU/L (ref 2–40)
CREAT SERPL-MCNC: 0.54 MG/DL (ref 0.57–1.11)
GFR SERPL CREATININE-BSD FRML MDRD: >60 ML/MIN/1.73M2
GLUCOSE SERPL-MCNC: 124 MG/DL (ref 65–100)
POTASSIUM SERPL-SCNC: 3.8 MMOL/L (ref 3.5–5)

## 2019-08-26 ENCOUNTER — OFFICE VISIT (OUTPATIENT)
Dept: FAMILY MEDICINE | Facility: CLINIC | Age: 64
End: 2019-08-26
Payer: COMMERCIAL

## 2019-08-26 VITALS
DIASTOLIC BLOOD PRESSURE: 70 MMHG | TEMPERATURE: 97.5 F | WEIGHT: 164 LBS | SYSTOLIC BLOOD PRESSURE: 120 MMHG | BODY MASS INDEX: 29.05 KG/M2 | HEART RATE: 75 BPM

## 2019-08-26 DIAGNOSIS — Z09 HOSPITAL DISCHARGE FOLLOW-UP: Primary | ICD-10-CM

## 2019-08-26 DIAGNOSIS — C24.1 AMPULLARY CARCINOMA (H): ICD-10-CM

## 2019-08-26 LAB
BASOPHILS # BLD AUTO: 0.1 10E9/L (ref 0–0.2)
BASOPHILS NFR BLD AUTO: 1 %
DIFFERENTIAL METHOD BLD: ABNORMAL
EOSINOPHIL # BLD AUTO: 0.3 10E9/L (ref 0–0.7)
EOSINOPHIL NFR BLD AUTO: 3 %
ERYTHROCYTE [DISTWIDTH] IN BLOOD BY AUTOMATED COUNT: 14.5 % (ref 10–15)
HCT VFR BLD AUTO: 29.2 % (ref 35–47)
HGB BLD-MCNC: 9.5 G/DL (ref 11.7–15.7)
LYMPHOCYTES # BLD AUTO: 1.9 10E9/L (ref 0.8–5.3)
LYMPHOCYTES NFR BLD AUTO: 16 %
MCH RBC QN AUTO: 33 PG (ref 26.5–33)
MCHC RBC AUTO-ENTMCNC: 32.5 G/DL (ref 31.5–36.5)
MCV RBC AUTO: 101 FL (ref 78–100)
MICROCYTES BLD QL SMEAR: PRESENT
MONOCYTES # BLD AUTO: 1.3 10E9/L (ref 0–1.3)
MONOCYTES NFR BLD AUTO: 11 %
NEUTROPHILS # BLD AUTO: 8 10E9/L (ref 1.6–8.3)
NEUTROPHILS NFR BLD AUTO: 69 %
PLATELET # BLD AUTO: 607 10E9/L (ref 150–450)
PLATELET # BLD EST: ABNORMAL 10*3/UL
POLYCHROMASIA BLD QL SMEAR: ABNORMAL
RBC # BLD AUTO: 2.88 10E12/L (ref 3.8–5.2)
WBC # BLD AUTO: 11.6 10E9/L (ref 4–11)

## 2019-08-26 PROCEDURE — 85025 COMPLETE CBC W/AUTO DIFF WBC: CPT | Performed by: NURSE PRACTITIONER

## 2019-08-26 PROCEDURE — 80076 HEPATIC FUNCTION PANEL: CPT | Performed by: NURSE PRACTITIONER

## 2019-08-26 PROCEDURE — 99214 OFFICE O/P EST MOD 30 MIN: CPT | Performed by: NURSE PRACTITIONER

## 2019-08-26 PROCEDURE — 36415 COLL VENOUS BLD VENIPUNCTURE: CPT | Performed by: NURSE PRACTITIONER

## 2019-08-26 RX ORDER — PANTOPRAZOLE SODIUM 40 MG/1
40 TABLET, DELAYED RELEASE ORAL
Status: ON HOLD | COMMUNITY
Start: 2019-08-21 | End: 2019-10-14

## 2019-08-26 RX ORDER — OXYCODONE HYDROCHLORIDE 5 MG/1
TABLET ORAL
Status: ON HOLD | COMMUNITY
Start: 2019-08-21 | End: 2019-10-14

## 2019-08-26 RX ORDER — DIAZEPAM 5 MG
2.5 TABLET ORAL
Status: ON HOLD | COMMUNITY
Start: 2019-08-21 | End: 2019-10-14

## 2019-08-26 NOTE — PROGRESS NOTES
Subjective     Mckayla Grady is a 64 year old female who presents to clinic today for the following health issues:    HPI       Hospital Follow-up Visit:    Hospital/Nursing Home/IP Rehab Facility: [facility]   Date of Admission: 8/14  Date of Discharge: 8/22  Reason(s) for Admission: staging laparoscopy, open pancreaticoduodenectomy             Problems taking medications regularly:  None       Medication changes since discharge: lovenox this am and finished abx yesterday        Problems adhering to non-medication therapy:  None    Summary of hospitalization:  CareEverywhere information obtained and reviewed  Diagnostic Tests/Treatments reviewed.  Follow up needed: Oncology and surgery  Other Healthcare Providers Involved in Patient s Care:         None  Update since discharge: stable.     Post Discharge Medication Reconciliation: discharge medications reconciled, continue medications without change.  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 Feels better than pre-procedure. Had some anemia in the hospital. No transfusions needed. Will be seeing oncology soon to decide whether or not she needs to start chemotherapy.       Patient Active Problem List   Diagnosis     Calculus of kidney     Esophageal reflux     Family history of colon cancer     Colon polyps     DDD (degenerative disc disease), lumbar     Central spinal stenosis     Overweight     S/P complete hysterectomy     Anxiety state     Elevated TSH     S/P lumbar fusion     Ampullary carcinoma (H)     Past Surgical History:   Procedure Laterality Date     APPENDECTOMY       C EXPLORE/TREAT ANKLE JOINT  1989     C TOTAL ABDOM HYSTERECTOMY  2002     with bilat ovary removal     COLONOSCOPY  4/2009     COLONOSCOPY  4/2012    Repeat in 5 years     DISCECTOMY LUMBAR POSTERIOR MICROSCOPIC ONE LEVEL Right 1/8/2015    Procedure: DISCECTOMY LUMBAR POSTERIOR MICROSCOPIC ONE LEVEL;  Surgeon: Román Smith MD;  Location:  OR     DISKECTOMY, LUMBAR, SINGLE SP  2001    X 2     DISKECTOMY, LUMBAR, SINGLE SP  2006      ENDOSCOPIC RETROGRADE CHOLANGIOPANCREATOGRAM N/A 7/20/2019    Procedure: ENDOSCOPIC RETROGRADE CHOLANGIOPANCREATPGRAPHY, SPINCTEROTOMY, BALLOON SWEEP, AMPILLARY BIOPSY, STENT PLACEMENT, SPHINCTEROTOMY;  Surgeon: Hansel Tatum MD;  Location:  OR     ENDOSCOPIC RETROGRADE CHOLANGIOPANCREATOGRAM N/A 7/24/2019    Procedure: ENDOSCOPIC RETROGRADE CHOLANGIOPANCREATOGRAPHY, COMMON BILE DUCT BRUSHINGS AND 10f/7CM STENT PLACEMENT X 2 STENTS, BIOPSIES OF AMPULA  MASS AND;  Surgeon: Michelle Leahy MD;  Location:  OR     ENDOSCOPIC ULTRASOUND UPPER GASTROINTESTINAL TRACT (GI) N/A 7/24/2019    Procedure: ENDOSCOPIC ULTRASOUND, ESOPHAGOSCOPY / UPPER GASTROINTESTINAL TRACT, STENT REMOVAL, AND FNA / BIOPIES OF AMPULA  MASS;  Surgeon: Michelle Leahy MD;  Location:  OR     HC REVISE MEDIAN N/CARPAL TUNNEL SURG  2002     HYSTERECTOMY, PAP NO LONGER INDICATED       OPTICAL TRACKING SYSTEM FUSION SPINE POSTERIOR LUMBAR TWO LEVELS N/A 8/4/2016    Procedure: OPTICAL TRACKING SYSTEM FUSION SPINE POSTERIOR LUMBAR TWO LEVELS;  Surgeon: Román Smith MD;  Location:  OR     ORTHOPEDIC SURGERY  9/2015    GT TOE JOINT FUSION - BILATERAL     SURGICAL HISTORY OF -       C-secs x3     SURGICAL HISTORY OF -   1976    laparoscopy benign enlarged lymph node     SURGICAL HISTORY OF -   1963    appendectomy       Social History     Tobacco Use     Smoking status: Never Smoker     Smokeless tobacco: Never Used   Substance Use Topics     Alcohol use: Not Currently     Alcohol/week: 0.0 oz     Comment: 6/week     Family History    Problem Relation Age of Onset     Heart Disease Father         CHF     Arthritis Father      Eye Disorder Father         glaucoma     C.A.D. Father         CABG     Genetic Disorder Father      Cancer - colorectal Mother      Cerebrovascular Disease Maternal Grandmother      Eye Disorder Paternal Grandmother         glaucoma     Diabetes No family hx of      Hypertension No family hx of      Breast Cancer No family hx of      Alzheimer Disease No family hx of      Anesthesia Reaction No family hx of      Blood Disease No family hx of      Neurologic Disorder No family hx of      Thyroid Disease No family hx of      Osteoporosis No family hx of            Reviewed and updated as needed this visit by Provider  Tobacco  Allergies  Meds  Problems  Med Hx  Surg Hx  Fam Hx         Review of Systems   ROS COMP: Constitutional, GI, neuro, skin systems are negative, except as otherwise noted.      Objective    /70 (BP Location: Right arm, Patient Position: Chair, Cuff Size: Adult Regular)   Pulse 75   Temp 97.5  F (36.4  C) (Tympanic)   Wt 74.4 kg (164 lb)   LMP 05/21/2002   BMI 29.05 kg/m    Body mass index is 29.05 kg/m .  Physical Exam   GENERAL: alert and no distress  RESP: lungs clear to auscultation - no rales, rhonchi or wheezes  CV: regular rate and rhythm, normal S1 S2, no S3 or S4, no murmur, click or rub, no peripheral edema and peripheral pulses strong  ABDOMEN: Vertical incision with staples in mid-abdomen. No evidence of wound complication / infection / dehiscence. Abdomen soft, no hepatosplenomegaly, no masses and bowel sounds normal  SKIN: no suspicious lesions or rashes; No jaundice.  NEURO: Normal strength and tone, mentation intact and speech normal  PSYCH: mentation appears normal, affect normal/bright    Diagnostic Test Results:  No results found for this or any previous visit (from the past 24 hour(s)).        Assessment & Plan     Mckayla was seen today for hospital f/u. Doing fair,  "subjectively. Plan in place for follow up with surgery and oncology. Will check hemoglobin today to ensure rebound. Will check LFTs, as well, so she can alert surgery team if unexpectedly abnormal. Incision intact and without concerns. No other acute issues. Follow up as needed.     Diagnoses and all orders for this visit:    Hospital discharge follow-up  -     CBC with platelets and differential  -     Hepatic panel (Albumin, ALT, AST, Bili, Alk Phos, TP)    Ampullary carcinoma (H)  -     Hepatic panel (Albumin, ALT, AST, Bili, Alk Phos, TP)         BMI:   Estimated body mass index is 29.05 kg/m  as calculated from the following:    Height as of 7/24/19: 1.6 m (5' 3\").    Weight as of this encounter: 74.4 kg (164 lb).           See Patient Instructions    Return in about 4 weeks (around 9/23/2019) for persistent or worsening symptoms.    Gael Jacobson NP  [facility] [place]      "

## 2019-08-27 LAB
ALBUMIN SERPL-MCNC: 3.5 G/DL (ref 3.4–5)
ALP SERPL-CCNC: 225 U/L (ref 40–150)
ALT SERPL W P-5'-P-CCNC: 47 U/L (ref 0–50)
AST SERPL W P-5'-P-CCNC: 43 U/L (ref 0–45)
BILIRUB DIRECT SERPL-MCNC: 0.8 MG/DL (ref 0–0.2)
BILIRUB SERPL-MCNC: 1.3 MG/DL (ref 0.2–1.3)
PROT SERPL-MCNC: 6.9 G/DL (ref 6.8–8.8)

## 2019-09-12 ENCOUNTER — OFFICE VISIT (OUTPATIENT)
Dept: FAMILY MEDICINE | Facility: CLINIC | Age: 64
End: 2019-09-12
Payer: COMMERCIAL

## 2019-09-12 ENCOUNTER — ANCILLARY PROCEDURE (OUTPATIENT)
Dept: GENERAL RADIOLOGY | Facility: CLINIC | Age: 64
End: 2019-09-12
Attending: NURSE PRACTITIONER
Payer: COMMERCIAL

## 2019-09-12 VITALS
TEMPERATURE: 97.9 F | DIASTOLIC BLOOD PRESSURE: 58 MMHG | SYSTOLIC BLOOD PRESSURE: 120 MMHG | OXYGEN SATURATION: 97 % | HEART RATE: 88 BPM | BODY MASS INDEX: 28.34 KG/M2 | WEIGHT: 160 LBS

## 2019-09-12 DIAGNOSIS — R50.9 FEVER, UNSPECIFIED FEVER CAUSE: ICD-10-CM

## 2019-09-12 DIAGNOSIS — N30.01 ACUTE CYSTITIS WITH HEMATURIA: Primary | ICD-10-CM

## 2019-09-12 DIAGNOSIS — C24.1 AMPULLARY CARCINOMA (H): ICD-10-CM

## 2019-09-12 LAB
ALBUMIN UR-MCNC: 100 MG/DL
APPEARANCE UR: CLEAR
BACTERIA #/AREA URNS HPF: ABNORMAL /HPF
BASOPHILS # BLD AUTO: 0 10E9/L (ref 0–0.2)
BASOPHILS NFR BLD AUTO: 0.2 %
BILIRUB UR QL STRIP: ABNORMAL
COLOR UR AUTO: ABNORMAL
DIFFERENTIAL METHOD BLD: ABNORMAL
EOSINOPHIL # BLD AUTO: 0.2 10E9/L (ref 0–0.7)
EOSINOPHIL NFR BLD AUTO: 1.5 %
ERYTHROCYTE [DISTWIDTH] IN BLOOD BY AUTOMATED COUNT: 13.2 % (ref 10–15)
GLUCOSE UR STRIP-MCNC: NEGATIVE MG/DL
HCT VFR BLD AUTO: 32.2 % (ref 35–47)
HGB BLD-MCNC: 10.7 G/DL (ref 11.7–15.7)
HGB UR QL STRIP: ABNORMAL
KETONES UR STRIP-MCNC: ABNORMAL MG/DL
LEUKOCYTE ESTERASE UR QL STRIP: NEGATIVE
LYMPHOCYTES # BLD AUTO: 1.2 10E9/L (ref 0.8–5.3)
LYMPHOCYTES NFR BLD AUTO: 9.9 %
MCH RBC QN AUTO: 32.3 PG (ref 26.5–33)
MCHC RBC AUTO-ENTMCNC: 33.2 G/DL (ref 31.5–36.5)
MCV RBC AUTO: 97 FL (ref 78–100)
MONOCYTES # BLD AUTO: 2.2 10E9/L (ref 0–1.3)
MONOCYTES NFR BLD AUTO: 18.1 %
NEUTROPHILS # BLD AUTO: 8.7 10E9/L (ref 1.6–8.3)
NEUTROPHILS NFR BLD AUTO: 70.3 %
NITRATE UR QL: NEGATIVE
NON-SQ EPI CELLS #/AREA URNS LPF: ABNORMAL /LPF
PH UR STRIP: 5.5 PH (ref 5–7)
PLATELET # BLD AUTO: 264 10E9/L (ref 150–450)
RBC # BLD AUTO: 3.31 10E12/L (ref 3.8–5.2)
RBC #/AREA URNS AUTO: ABNORMAL /HPF
SOURCE: ABNORMAL
SP GR UR STRIP: 1.02 (ref 1–1.03)
UROBILINOGEN UR STRIP-ACNC: 1 EU/DL (ref 0.2–1)
WBC # BLD AUTO: 12.4 10E9/L (ref 4–11)
WBC #/AREA URNS AUTO: ABNORMAL /HPF

## 2019-09-12 PROCEDURE — 85025 COMPLETE CBC W/AUTO DIFF WBC: CPT | Performed by: NURSE PRACTITIONER

## 2019-09-12 PROCEDURE — 99214 OFFICE O/P EST MOD 30 MIN: CPT | Performed by: NURSE PRACTITIONER

## 2019-09-12 PROCEDURE — 36415 COLL VENOUS BLD VENIPUNCTURE: CPT | Performed by: NURSE PRACTITIONER

## 2019-09-12 PROCEDURE — 71046 X-RAY EXAM CHEST 2 VIEWS: CPT | Mod: FY

## 2019-09-12 PROCEDURE — 81001 URINALYSIS AUTO W/SCOPE: CPT | Performed by: NURSE PRACTITIONER

## 2019-09-12 RX ORDER — NITROFURANTOIN 25; 75 MG/1; MG/1
100 CAPSULE ORAL 2 TIMES DAILY
Qty: 10 CAPSULE | Refills: 0 | Status: SHIPPED | OUTPATIENT
Start: 2019-09-12 | End: 2019-10-11

## 2019-09-12 NOTE — PROGRESS NOTES
Subjective     Mckayla Grady is a 64 year old female who presents to clinic today for the following health issues:    HPI   Acute Illness   Acute illness concerns: fever   Onset: Monday     Fever: YES- reported 102.6- doesn't have one in the am    Chills/Sweats: YES    Headache (location?): YES    Sinus Pressure:no    Conjunctivitis:  no    Ear Pain: no    Rhinorrhea: no    Congestion: no    Sore Throat: no     Cough: no    Wheeze: YES    Decreased Appetite: YES    Nausea: no    Vomiting: no    Diarrhea:  no    Dysuria/Freq.: no    Fatigue/Achiness: YES    Sick/Strep Exposure: no     Therapies Tried and outcome: inhaler, tylenol, ibuprofen     HPI: Mckayla presents today with the complaint of unexplained fever. She was recently diagnosed with ampullary carcinoma and had a Whipple Procedure performed. She is not undergoing non-surgical cancer treatments. She had been recovering at home relatively uneventfully, but, she now notes that she has been developing fever every afternoon / evening since Monday. She states that it has been as high as 102.6 F. Aside from some minor fatigue, she denies any other focal symptoms that could provide clarity regarding etiology (including any redness, warmth, swelling, and drainage associated with her abdominal incision). She call her surgeon's office today and it was suggested that she see her PCP.     Patient Active Problem List   Diagnosis     Calculus of kidney     Esophageal reflux     Family history of colon cancer     Colon polyps     DDD (degenerative disc disease), lumbar     Central spinal stenosis     Overweight     S/P complete hysterectomy     Anxiety state     Elevated TSH     S/P lumbar fusion     Ampullary carcinoma (H)     Past Surgical History:   Procedure Laterality Date     APPENDECTOMY       C EXPLORE/TREAT ANKLE JOINT  1989     C TOTAL ABDOM HYSTERECTOMY  2002    with bilat ovary removal     COLONOSCOPY  4/2009     COLONOSCOPY  4/2012    Repeat in 5 years      DISCECTOMY LUMBAR POSTERIOR MICROSCOPIC ONE LEVEL Right 1/8/2015    Procedure: DISCECTOMY LUMBAR POSTERIOR MICROSCOPIC ONE LEVEL;  Surgeon: Román Smith MD;  Location:  OR     DISKECTOMY, LUMBAR, SINGLE SP  2001    X 2     DISKECTOMY, LUMBAR, SINGLE SP  2006      ENDOSCOPIC RETROGRADE CHOLANGIOPANCREATOGRAM N/A 7/20/2019    Procedure: ENDOSCOPIC RETROGRADE CHOLANGIOPANCREATPGRAPHY, SPINCTEROTOMY, BALLOON SWEEP, AMPILLARY BIOPSY, STENT PLACEMENT, SPHINCTEROTOMY;  Surgeon: Hansel Tatum MD;  Location:  OR     ENDOSCOPIC RETROGRADE CHOLANGIOPANCREATOGRAM N/A 7/24/2019    Procedure: ENDOSCOPIC RETROGRADE CHOLANGIOPANCREATOGRAPHY, COMMON BILE DUCT BRUSHINGS AND 10f/7CM STENT PLACEMENT X 2 STENTS, BIOPSIES OF AMPULA  MASS AND;  Surgeon: Michelle Leahy MD;  Location:  OR     ENDOSCOPIC ULTRASOUND UPPER GASTROINTESTINAL TRACT (GI) N/A 7/24/2019    Procedure: ENDOSCOPIC ULTRASOUND, ESOPHAGOSCOPY / UPPER GASTROINTESTINAL TRACT, STENT REMOVAL, AND FNA / BIOPIES OF AMPULA  MASS;  Surgeon: Michelle Leahy MD;  Location:  OR     HC REVISE MEDIAN N/CARPAL TUNNEL SURG  2002     HYSTERECTOMY, PAP NO LONGER INDICATED       OPTICAL TRACKING SYSTEM FUSION SPINE POSTERIOR LUMBAR TWO LEVELS N/A 8/4/2016    Procedure: OPTICAL TRACKING SYSTEM FUSION SPINE POSTERIOR LUMBAR TWO LEVELS;  Surgeon: Román Smith MD;  Location:  OR     ORTHOPEDIC SURGERY  9/2015    GT TOE JOINT FUSION - BILATERAL     SURGICAL HISTORY OF -       C-secs x3     SURGICAL HISTORY OF -   1976    laparoscopy benign enlarged lymph node     SURGICAL HISTORY OF -   1963    appendectomy       Social History     Tobacco Use     Smoking status: Never Smoker     Smokeless tobacco: Never Used   Substance Use Topics     Alcohol use: Not Currently     Alcohol/week: 0.0 oz     Comment: 6/week     Family History   Problem Relation Age of Onset     Heart Disease Father         CHF     Arthritis Father      Eye  Disorder Father         glaucoma     C.A.D. Father         CABG     Genetic Disorder Father      Cancer - colorectal Mother      Cerebrovascular Disease Maternal Grandmother      Eye Disorder Paternal Grandmother         glaucoma     Diabetes No family hx of      Hypertension No family hx of      Breast Cancer No family hx of      Alzheimer Disease No family hx of      Anesthesia Reaction No family hx of      Blood Disease No family hx of      Neurologic Disorder No family hx of      Thyroid Disease No family hx of      Osteoporosis No family hx of            Reviewed and updated as needed this visit by Provider  Tobacco  Allergies  Meds  Problems  Med Hx  Surg Hx  Fam Hx         Review of Systems   ROS COMP: Constitutional, HEENT, pulmonary, GI, , musculoskeletal, neuro, skin systems are negative, except as otherwise noted.      Objective    /58 (BP Location: Right arm, Patient Position: Chair, Cuff Size: Adult Regular)   Pulse 88   Temp 97.9  F (36.6  C) (Tympanic)   Wt 72.6 kg (160 lb)   LMP 05/21/2002   SpO2 97%   BMI 28.34 kg/m    Body mass index is 28.34 kg/m .  Physical Exam   GENERAL: healthy, alert and no distress  HENT: ear canals and TM's normal, nose and mouth without ulcers or lesions  NECK: no adenopathy, no asymmetry, masses, or scars and thyroid normal to palpation  RESP: lungs clear to auscultation - no rales, rhonchi or wheezes  CV: regular rate and rhythm, normal S1 S2, no S3 or S4, no murmur, click or rub, no peripheral edema and peripheral pulses strong  ABDOMEN: soft, nontender, no hepatosplenomegaly, no masses and bowel sounds normal; Midline abdominal incision (with staples removed) and drain exit site with evidence of appropriate healing and no evidence of infection.   NEURO: Normal strength and tone, mentation intact and speech normal    Diagnostic Test Results:  Results for orders placed or performed in visit on 09/12/19 (from the past 24 hour(s))   *UA reflex to  Microscopic and Culture ([place] and [facility] (except [place] and [place])   Result Value Ref Range    Color Urine Brown     Appearance Urine Clear     Glucose Urine Negative NEG^Negative mg/dL    Bilirubin Urine Moderate (A) NEG^Negative    Ketones Urine Trace (A) NEG^Negative mg/dL    Specific Gravity Urine 1.025 1.003 - 1.035    Blood Urine Trace (A) NEG^Negative    pH Urine 5.5 5.0 - 7.0 pH    Protein Albumin Urine 100 (A) NEG^Negative mg/dL    Urobilinogen Urine 1.0 0.2 - 1.0 EU/dL    Nitrite Urine Negative NEG^Negative    Leukocyte Esterase Urine Negative NEG^Negative    Source Midstream Urine    CBC with platelets and differential   Result Value Ref Range    WBC 12.4 (H) 4.0 - 11.0 10e9/L    RBC Count 3.31 (L) 3.8 - 5.2 10e12/L    Hemoglobin 10.7 (L) 11.7 - 15.7 g/dL    Hematocrit 32.2 (L) 35.0 - 47.0 %    MCV 97 78 - 100 fl    MCH 32.3 26.5 - 33.0 pg    MCHC 33.2 31.5 - 36.5 g/dL    RDW 13.2 10.0 - 15.0 %    Platelet Count 264 150 - 450 10e9/L    % Neutrophils 70.3 %    % Lymphocytes 9.9 %    % Monocytes 18.1 %    % Eosinophils 1.5 %    % Basophils 0.2 %    Absolute Neutrophil 8.7 (H) 1.6 - 8.3 10e9/L    Absolute Lymphocytes 1.2 0.8 - 5.3 10e9/L    Absolute Monocytes 2.2 (H) 0.0 - 1.3 10e9/L    Absolute Eosinophils 0.2 0.0 - 0.7 10e9/L    Absolute Basophils 0.0 0.0 - 0.2 10e9/L    Diff Method Automated Method    Urine Microscopic   Result Value Ref Range    WBC Urine 5-10 (A) OTO5^0 - 5 /HPF    RBC Urine 2-5 (A) OTO2^O - 2 /HPF    Squamous Epithelial /LPF Urine Many (A) FEW^Few /LPF    Bacteria Urine Many (A) NEG^Negative /HPF     CXR - Personally-reviewed. No evidence of infiltrate, pneumothorax, pleural effusion.        Assessment & Plan     Mckayla was seen today for fever. Concerning presentation given recent medical history and lack of focal complaints to suggest etiology of fever. CXR was unremarkable. CBC did exhibit mild leukocytosis with neutrophilia, and UA revealed likely acute cystitis  "(despite no symptoms). Will treat with nitrofurantoin for 5 days. Recommend rest and pushing fluids, as able. Will culture urine and call if sensitivity report suggests a change in antimicrobial therapy. No red flags. Discussed reasons to call or return to clinic. Mckayla acknowledges and demonstrates understanding of circumstances under which care should be sought urgently or emergently. Follow up as discussed. Discussed risks, benefits, alternatives, potential side effects, and proper administration of new medication / treatment. Agrees with plan of care. All questions answered.     Diagnoses and all orders for this visit:    Acute cystitis with hematuria  -     nitroFURantoin macrocrystal-monohydrate (MACROBID) 100 MG capsule; Take 1 capsule (100 mg) by mouth 2 times daily for 5 days    Fever, unspecified fever cause  -     XR Chest 2 Views; Future  -     *UA reflex to Microscopic and Culture ([place] and [facility] (except [place] and [place])  -     CBC with platelets and differential  -     Urine Microscopic    Ampullary carcinoma (H)  Comment: Recent surgery and hospitalization. Will be discussing medical oncology options imminently.          BMI:   Estimated body mass index is 28.34 kg/m  as calculated from the following:    Height as of 7/24/19: 1.6 m (5' 3\").    Weight as of this encounter: 72.6 kg (160 lb).       See Patient Instructions    Return in about 5 days (around 9/17/2019) for persistent or worsening symptoms.    Gael Jacobson NP  [facility] [place]      "

## 2019-09-13 ENCOUNTER — TELEPHONE (OUTPATIENT)
Dept: FAMILY MEDICINE | Facility: CLINIC | Age: 64
End: 2019-09-13

## 2019-09-16 ENCOUNTER — MYC MEDICAL ADVICE (OUTPATIENT)
Dept: FAMILY MEDICINE | Facility: CLINIC | Age: 64
End: 2019-09-16

## 2019-09-16 DIAGNOSIS — R05.9 COUGH: Primary | ICD-10-CM

## 2019-09-16 DIAGNOSIS — R09.81 CONGESTION OF PARANASAL SINUS: ICD-10-CM

## 2019-09-16 NOTE — TELEPHONE ENCOUNTER
Please see [employer] message and advise. Thank you very much.    Kellie Whitten RN, BSN  [facility] [place]

## 2019-09-26 ENCOUNTER — TRANSFERRED RECORDS (OUTPATIENT)
Dept: HEALTH INFORMATION MANAGEMENT | Facility: CLINIC | Age: 64
End: 2019-09-26

## 2019-09-30 ENCOUNTER — HOSPITAL ENCOUNTER (OUTPATIENT)
Dept: LAB | Facility: CLINIC | Age: 64
Discharge: HOME | End: 2019-09-30
Attending: INTERNAL MEDICINE | Admitting: INTERNAL MEDICINE
Payer: COMMERCIAL

## 2019-09-30 PROCEDURE — 88341 IMHCHEM/IMCYTCHM EA ADD ANTB: CPT | Performed by: INTERNAL MEDICINE

## 2019-09-30 PROCEDURE — 88342 IMHCHEM/IMCYTCHM 1ST ANTB: CPT | Performed by: INTERNAL MEDICINE

## 2019-09-30 PROCEDURE — 88342 IMHCHEM/IMCYTCHM 1ST ANTB: CPT | Mod: 26 | Performed by: INTERNAL MEDICINE

## 2019-09-30 PROCEDURE — 88341 IMHCHEM/IMCYTCHM EA ADD ANTB: CPT | Mod: 26,76 | Performed by: INTERNAL MEDICINE

## 2019-10-02 ENCOUNTER — TELEPHONE (OUTPATIENT)
Dept: FAMILY MEDICINE | Facility: CLINIC | Age: 64
End: 2019-10-02

## 2019-10-02 LAB — COPATH REPORT: NORMAL

## 2019-10-02 NOTE — TELEPHONE ENCOUNTER
Reason for Call:  call back    Detailed comments: Pt would like to get her shots updated. She has questions on what ones and how many she can have at one time .    Phone Number Patient can be reached at: Cell number on file:    Telephone Information:   Mobile [phone]       Best Time: any    Can we leave a detailed message on this number? Yes     Call taken on 10/2/2019 at 12:30 PM by Loly Correa

## 2019-10-02 NOTE — TELEPHONE ENCOUNTER
Called patient back. Scheduled patient to get all three immunizations on Friday nurse only visit. Patient verbalized understanding and agrees with plan.     Kellie Whitten RN, BSN  [facility] [place]

## 2019-10-03 ENCOUNTER — HEALTH MAINTENANCE LETTER (OUTPATIENT)
Age: 64
End: 2019-10-03

## 2019-10-04 ENCOUNTER — ALLIED HEALTH/NURSE VISIT (OUTPATIENT)
Dept: NURSING | Facility: CLINIC | Age: 64
End: 2019-10-04
Payer: COMMERCIAL

## 2019-10-04 DIAGNOSIS — Z23 NEED FOR VACCINATION: ICD-10-CM

## 2019-10-04 DIAGNOSIS — Z23 NEED FOR PROPHYLACTIC VACCINATION AND INOCULATION AGAINST INFLUENZA: ICD-10-CM

## 2019-10-04 PROCEDURE — 90662 IIV NO PRSV INCREASED AG IM: CPT

## 2019-10-04 PROCEDURE — 90471 IMMUNIZATION ADMIN: CPT

## 2019-10-04 PROCEDURE — 90750 HZV VACC RECOMBINANT IM: CPT

## 2019-10-04 PROCEDURE — 99207 ZZC NO CHARGE NURSE ONLY: CPT

## 2019-10-04 PROCEDURE — 90472 IMMUNIZATION ADMIN EACH ADD: CPT

## 2019-10-04 PROCEDURE — 90670 PCV13 VACCINE IM: CPT

## 2019-10-11 RX ORDER — TRAZODONE HYDROCHLORIDE 50 MG/1
50 TABLET, FILM COATED ORAL AT BEDTIME
COMMUNITY
End: 2020-01-28

## 2019-10-11 RX ORDER — CETIRIZINE HYDROCHLORIDE 10 MG/1
10 TABLET, CHEWABLE ORAL DAILY
COMMUNITY

## 2019-10-11 RX ORDER — GABAPENTIN 600 MG/1
600 TABLET ORAL DAILY
COMMUNITY
End: 2021-01-29

## 2019-10-13 ENCOUNTER — NURSE TRIAGE (OUTPATIENT)
Dept: NURSING | Facility: CLINIC | Age: 64
End: 2019-10-13

## 2019-10-13 NOTE — TELEPHONE ENCOUNTER
"Mckayla informs that she has a port placement procedure tomorrow and asks if it's okay to take ibuprofen as her back has \"gone out.\" She too ibuprofen last night. FNA recommended for her to take Tylenol instead and follow package directions. She verbalized understanding of instruction.    Eva Simental RN/[place] Nurse Advisor      Reason for Disposition    Caller has medication question only, adult not sick, and triager answers question    Protocols used: MEDICATION QUESTION CALL-A-AH      "

## 2019-10-14 ENCOUNTER — ANESTHESIA (OUTPATIENT)
Dept: SURGERY | Facility: CLINIC | Age: 64
End: 2019-10-14
Payer: COMMERCIAL

## 2019-10-14 ENCOUNTER — HOSPITAL ENCOUNTER (OUTPATIENT)
Facility: CLINIC | Age: 64
Discharge: HOME | End: 2019-10-14
Attending: THORACIC SURGERY (CARDIOTHORACIC VASCULAR SURGERY) | Admitting: THORACIC SURGERY (CARDIOTHORACIC VASCULAR SURGERY)
Payer: COMMERCIAL

## 2019-10-14 ENCOUNTER — ANESTHESIA EVENT (OUTPATIENT)
Dept: SURGERY | Facility: CLINIC | Age: 64
End: 2019-10-14
Payer: COMMERCIAL

## 2019-10-14 ENCOUNTER — APPOINTMENT (OUTPATIENT)
Dept: GENERAL RADIOLOGY | Facility: CLINIC | Age: 64
End: 2019-10-14
Attending: THORACIC SURGERY (CARDIOTHORACIC VASCULAR SURGERY)
Payer: COMMERCIAL

## 2019-10-14 VITALS
DIASTOLIC BLOOD PRESSURE: 77 MMHG | HEIGHT: 63 IN | SYSTOLIC BLOOD PRESSURE: 143 MMHG | TEMPERATURE: 96.6 F | BODY MASS INDEX: 27.85 KG/M2 | WEIGHT: 157.2 LBS | OXYGEN SATURATION: 99 % | HEART RATE: 56 BPM | RESPIRATION RATE: 16 BRPM

## 2019-10-14 DIAGNOSIS — G89.18 ACUTE POST-OPERATIVE PAIN: Primary | ICD-10-CM

## 2019-10-14 DIAGNOSIS — C24.1 AMPULLARY CARCINOMA (H): ICD-10-CM

## 2019-10-14 PROCEDURE — 71000027 ZZH RECOVERY PHASE 2 EACH 15 MINS: Performed by: THORACIC SURGERY (CARDIOTHORACIC VASCULAR SURGERY)

## 2019-10-14 PROCEDURE — 40000985 XR CHEST PORT 1 VW

## 2019-10-14 PROCEDURE — 27210794 ZZH OR GENERAL SUPPLY STERILE: Performed by: THORACIC SURGERY (CARDIOTHORACIC VASCULAR SURGERY)

## 2019-10-14 PROCEDURE — 36000054 ZZH SURGERY LEVEL 2 W FLUORO 1ST 30 MIN: Performed by: THORACIC SURGERY (CARDIOTHORACIC VASCULAR SURGERY)

## 2019-10-14 PROCEDURE — 25000128 H RX IP 250 OP 636: Performed by: NURSE ANESTHETIST, CERTIFIED REGISTERED

## 2019-10-14 PROCEDURE — 25000125 ZZHC RX 250: Performed by: NURSE ANESTHETIST, CERTIFIED REGISTERED

## 2019-10-14 PROCEDURE — 40000170 ZZH STATISTIC PRE-PROCEDURE ASSESSMENT II: Performed by: THORACIC SURGERY (CARDIOTHORACIC VASCULAR SURGERY)

## 2019-10-14 PROCEDURE — 36000052 ZZH SURGERY LEVEL 2 EA 15 ADDTL MIN: Performed by: THORACIC SURGERY (CARDIOTHORACIC VASCULAR SURGERY)

## 2019-10-14 PROCEDURE — 37000009 ZZH ANESTHESIA TECHNICAL FEE, EACH ADDTL 15 MIN: Performed by: THORACIC SURGERY (CARDIOTHORACIC VASCULAR SURGERY)

## 2019-10-14 PROCEDURE — 25800030 ZZH RX IP 258 OP 636: Performed by: NURSE ANESTHETIST, CERTIFIED REGISTERED

## 2019-10-14 PROCEDURE — 71000012 ZZH RECOVERY PHASE 1 LEVEL 1 FIRST HR: Performed by: THORACIC SURGERY (CARDIOTHORACIC VASCULAR SURGERY)

## 2019-10-14 PROCEDURE — C1788 PORT, INDWELLING, IMP: HCPCS | Performed by: THORACIC SURGERY (CARDIOTHORACIC VASCULAR SURGERY)

## 2019-10-14 PROCEDURE — 25800030 ZZH RX IP 258 OP 636: Performed by: THORACIC SURGERY (CARDIOTHORACIC VASCULAR SURGERY)

## 2019-10-14 PROCEDURE — 25000125 ZZHC RX 250: Performed by: THORACIC SURGERY (CARDIOTHORACIC VASCULAR SURGERY)

## 2019-10-14 PROCEDURE — 25000128 H RX IP 250 OP 636: Performed by: THORACIC SURGERY (CARDIOTHORACIC VASCULAR SURGERY)

## 2019-10-14 PROCEDURE — 37000008 ZZH ANESTHESIA TECHNICAL FEE, 1ST 30 MIN: Performed by: THORACIC SURGERY (CARDIOTHORACIC VASCULAR SURGERY)

## 2019-10-14 DEVICE — CATH PORT POWERPORT CLEARVUE ISP 8FR 5608062
Type: IMPLANTABLE DEVICE | Site: SUBCLAVIAN | Status: NON-FUNCTIONAL
Removed: 2020-08-28

## 2019-10-14 RX ORDER — HYDROMORPHONE HYDROCHLORIDE 1 MG/ML
.3-.5 INJECTION, SOLUTION INTRAMUSCULAR; INTRAVENOUS; SUBCUTANEOUS EVERY 5 MIN PRN
Status: DISCONTINUED | OUTPATIENT
Start: 2019-10-14 | End: 2019-10-14 | Stop reason: HOSPADM

## 2019-10-14 RX ORDER — HYDROCODONE BITARTRATE AND ACETAMINOPHEN 5; 325 MG/1; MG/1
1 TABLET ORAL
Status: DISCONTINUED | OUTPATIENT
Start: 2019-10-14 | End: 2019-10-14 | Stop reason: HOSPADM

## 2019-10-14 RX ORDER — ONDANSETRON 2 MG/ML
INJECTION INTRAMUSCULAR; INTRAVENOUS PRN
Status: DISCONTINUED | OUTPATIENT
Start: 2019-10-14 | End: 2019-10-14

## 2019-10-14 RX ORDER — NALOXONE HYDROCHLORIDE 0.4 MG/ML
.1-.4 INJECTION, SOLUTION INTRAMUSCULAR; INTRAVENOUS; SUBCUTANEOUS
Status: DISCONTINUED | OUTPATIENT
Start: 2019-10-14 | End: 2019-10-14 | Stop reason: HOSPADM

## 2019-10-14 RX ORDER — GLYCOPYRROLATE 0.2 MG/ML
INJECTION, SOLUTION INTRAMUSCULAR; INTRAVENOUS PRN
Status: DISCONTINUED | OUTPATIENT
Start: 2019-10-14 | End: 2019-10-14

## 2019-10-14 RX ORDER — ONDANSETRON 4 MG/1
4 TABLET, ORALLY DISINTEGRATING ORAL EVERY 30 MIN PRN
Status: DISCONTINUED | OUTPATIENT
Start: 2019-10-14 | End: 2019-10-14 | Stop reason: HOSPADM

## 2019-10-14 RX ORDER — HYDROCODONE BITARTRATE AND ACETAMINOPHEN 5; 325 MG/1; MG/1
1 TABLET ORAL EVERY 6 HOURS PRN
Qty: 4 TABLET | Refills: 0 | Status: SHIPPED | OUTPATIENT
Start: 2019-10-14 | End: 2020-08-25

## 2019-10-14 RX ORDER — SODIUM CHLORIDE, SODIUM LACTATE, POTASSIUM CHLORIDE, CALCIUM CHLORIDE 600; 310; 30; 20 MG/100ML; MG/100ML; MG/100ML; MG/100ML
INJECTION, SOLUTION INTRAVENOUS CONTINUOUS PRN
Status: DISCONTINUED | OUTPATIENT
Start: 2019-10-14 | End: 2019-10-14

## 2019-10-14 RX ORDER — PROPOFOL 10 MG/ML
INJECTION, EMULSION INTRAVENOUS PRN
Status: DISCONTINUED | OUTPATIENT
Start: 2019-10-14 | End: 2019-10-14

## 2019-10-14 RX ORDER — CEFAZOLIN SODIUM 2 G/100ML
INJECTION, SOLUTION INTRAVENOUS PRN
Status: DISCONTINUED | OUTPATIENT
Start: 2019-10-14 | End: 2019-10-14

## 2019-10-14 RX ORDER — LIDOCAINE HYDROCHLORIDE 20 MG/ML
INJECTION, SOLUTION INFILTRATION; PERINEURAL PRN
Status: DISCONTINUED | OUTPATIENT
Start: 2019-10-14 | End: 2019-10-14

## 2019-10-14 RX ORDER — ONDANSETRON 2 MG/ML
4 INJECTION INTRAMUSCULAR; INTRAVENOUS EVERY 30 MIN PRN
Status: DISCONTINUED | OUTPATIENT
Start: 2019-10-14 | End: 2019-10-14 | Stop reason: HOSPADM

## 2019-10-14 RX ORDER — FLUTICASONE PROPIONATE 50 MCG
1 SPRAY, SUSPENSION (ML) NASAL DAILY
COMMUNITY

## 2019-10-14 RX ORDER — PROPOFOL 10 MG/ML
INJECTION, EMULSION INTRAVENOUS CONTINUOUS PRN
Status: DISCONTINUED | OUTPATIENT
Start: 2019-10-14 | End: 2019-10-14

## 2019-10-14 RX ORDER — HEPARIN SODIUM (PORCINE) LOCK FLUSH IV SOLN 100 UNIT/ML 100 UNIT/ML
SOLUTION INTRAVENOUS PRN
Status: DISCONTINUED | OUTPATIENT
Start: 2019-10-14 | End: 2019-10-14 | Stop reason: HOSPADM

## 2019-10-14 RX ORDER — FENTANYL CITRATE 50 UG/ML
INJECTION, SOLUTION INTRAMUSCULAR; INTRAVENOUS PRN
Status: DISCONTINUED | OUTPATIENT
Start: 2019-10-14 | End: 2019-10-14

## 2019-10-14 RX ORDER — LIDOCAINE HYDROCHLORIDE 10 MG/ML
INJECTION, SOLUTION INFILTRATION; PERINEURAL PRN
Status: DISCONTINUED | OUTPATIENT
Start: 2019-10-14 | End: 2019-10-14 | Stop reason: HOSPADM

## 2019-10-14 RX ORDER — ACETAMINOPHEN 325 MG/1
650 TABLET ORAL
Status: DISCONTINUED | OUTPATIENT
Start: 2019-10-14 | End: 2019-10-14 | Stop reason: HOSPADM

## 2019-10-14 RX ORDER — SODIUM CHLORIDE, SODIUM LACTATE, POTASSIUM CHLORIDE, CALCIUM CHLORIDE 600; 310; 30; 20 MG/100ML; MG/100ML; MG/100ML; MG/100ML
INJECTION, SOLUTION INTRAVENOUS CONTINUOUS
Status: DISCONTINUED | OUTPATIENT
Start: 2019-10-14 | End: 2019-10-14 | Stop reason: HOSPADM

## 2019-10-14 RX ORDER — FENTANYL CITRATE 50 UG/ML
25-50 INJECTION, SOLUTION INTRAMUSCULAR; INTRAVENOUS
Status: DISCONTINUED | OUTPATIENT
Start: 2019-10-14 | End: 2019-10-14 | Stop reason: HOSPADM

## 2019-10-14 RX ADMIN — PROPOFOL 40 MG: 10 INJECTION, EMULSION INTRAVENOUS at 11:07

## 2019-10-14 RX ADMIN — PROPOFOL 50 MCG/KG/MIN: 10 INJECTION, EMULSION INTRAVENOUS at 11:02

## 2019-10-14 RX ADMIN — FENTANYL CITRATE 50 MCG: 50 INJECTION, SOLUTION INTRAMUSCULAR; INTRAVENOUS at 11:02

## 2019-10-14 RX ADMIN — CEFAZOLIN SODIUM 2 G: 2 INJECTION, SOLUTION INTRAVENOUS at 11:04

## 2019-10-14 RX ADMIN — ONDANSETRON 4 MG: 2 INJECTION INTRAMUSCULAR; INTRAVENOUS at 11:02

## 2019-10-14 RX ADMIN — SODIUM CHLORIDE, POTASSIUM CHLORIDE, SODIUM LACTATE AND CALCIUM CHLORIDE: 600; 310; 30; 20 INJECTION, SOLUTION INTRAVENOUS at 10:59

## 2019-10-14 RX ADMIN — GLYCOPYRROLATE 0.2 MG: 0.2 INJECTION, SOLUTION INTRAMUSCULAR; INTRAVENOUS at 11:05

## 2019-10-14 RX ADMIN — MIDAZOLAM 2 MG: 1 INJECTION INTRAMUSCULAR; INTRAVENOUS at 11:00

## 2019-10-14 RX ADMIN — LIDOCAINE HYDROCHLORIDE 40 MG: 20 INJECTION, SOLUTION INFILTRATION; PERINEURAL at 11:02

## 2019-10-14 ASSESSMENT — MIFFLIN-ST. JEOR: SCORE: 1222.18

## 2019-10-14 ASSESSMENT — LIFESTYLE VARIABLES: TOBACCO_USE: 0

## 2019-10-14 NOTE — OP NOTE
DATE OF PROCEDURE:  October 14, 2019      SURGEON:  Papa May MD   FIRST ASSIST: Karlie Shaw PA-C      PREOPERATIVE DIAGNOSIS:  pancreatic cancer      POSTOPERATIVE DIAGNOSIS:  Same       PROCEDURE:  Placement of [employer] PowerPort implantable port, left subclavian vein.       ANESTHESIA:  Local with lidocaine 1% without epinephrine and sedation.       INDICATIONS:  Patient will undergo chemotherapy and a PowerPort is indicated for venous access.       DESCRIPTION OF PROCEDURE:  The patient was brought to the OR and placed in supine position.  The patient was placed into Trendelenburg.  IV sedation was given.  The neck and upper chest were prepared and draped in the usual fashion using ChloraPrep.  Local anesthesia was performed with lidocaine 1% without epinephrine. The left subclavian vein was punctured easily with a 16-gauge needle on the first attempt.  There was excellent blood return.  A guidewire was advanced through the needle without any resistance.  The needle was removed.  A transverse incision was made along the guidewire.  Subcutaneous pocket was made inferior to the incision.  Hemostasis was verified and was excellent.  An introducer with a peel-away sheath was introduced into the vein over the guidewire.  Guidewire and introducer were then removed.  The catheter was advanced through the peel-away sheath without resistance. The peel-away sheath was removed.  The catheter was placed at 18 cm at the skin level.  The catheter was cut and connected to the port.  The port was placed in the subcutaneous pocket.  The port was accessed with a non-coring needle.  There was excellent blood return, PowerPort was finally flushed with 10 mL of solution of heparin flush.  The incision was closed in the usual fashion.  A chest x-ray performed in the operating room and showed the catheter was in excellent position and the PowerPort is ready to be used.           PAPA MAY MD

## 2019-10-14 NOTE — ANESTHESIA PREPROCEDURE EVALUATION
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]    Procedure(s):  PORT PLACEMENT (FLAT PLATE XRAY)    Past Medical History:   Diagnosis Date     Esophageal reflux      Hallux rigidus     bilatateral     Insomnia, unspecified      Numbness and tingling     localized to LLE; > TOP OF FOOT & CALF AREAS     Primary adenocarcinoma of ampulla of Vater (H)      Seasonal allergies      Past Surgical History:   Procedure Laterality Date     ABDOMEN SURGERY      csection X3     ABDOMEN SURGERY  2019    whipple resection     APPENDECTOMY  1963     C EXPLORE/TREAT ANKLE JOINT       C TOTAL ABDOM HYSTERECTOMY      with bilat ovary removal     COLONOSCOPY  2009     COLONOSCOPY  2012    Repeat in 5 years     DISCECTOMY LUMBAR POSTERIOR MICROSCOPIC ONE LEVEL Right 2015    Procedure: DISCECTOMY LUMBAR POSTERIOR MICROSCOPIC ONE LEVEL;  Surgeon: Román Smith MD;  Location:  OR     DISKECTOMY, LUMBAR, SINGLE SP  2001    X 2     DISKECTOMY, LUMBAR, SINGLE SP        ENDOSCOPIC RETROGRADE CHOLANGIOPANCREATOGRAM N/A 2019    Procedure: ENDOSCOPIC RETROGRADE CHOLANGIOPANCREATPGRAPHY, SPINCTEROTOMY, BALLOON SWEEP, AMPILLARY BIOPSY, STENT PLACEMENT, SPHINCTEROTOMY;  Surgeon: Hansel Tatum MD;  Location:  OR     ENDOSCOPIC RETROGRADE CHOLANGIOPANCREATOGRAM N/A 2019    Procedure: ENDOSCOPIC RETROGRADE CHOLANGIOPANCREATOGRAPHY, COMMON BILE DUCT BRUSHINGS AND 10f/7CM STENT PLACEMENT X 2 STENTS, BIOPSIES OF AMPULA  MASS AND;  Surgeon: Michelle Leahy MD;  Location:  OR     ENDOSCOPIC ULTRASOUND UPPER GASTROINTESTINAL TRACT (GI) N/A 2019    Procedure: ENDOSCOPIC ULTRASOUND, ESOPHAGOSCOPY / UPPER GASTROINTESTINAL TRACT, STENT REMOVAL, AND FNA / BIOPIES OF AMPULA  MASS;  Surgeon: Michelle Leahy MD;  Location:  OR      [facility] N/CARPAL  TUNNEL SURG  2002     HYSTERECTOMY, PAP NO LONGER INDICATED       OPTICAL TRACKING SYSTEM FUSION SPINE POSTERIOR LUMBAR TWO LEVELS N/A 8/4/2016    Procedure: OPTICAL TRACKING SYSTEM FUSION SPINE POSTERIOR LUMBAR TWO LEVELS;  Surgeon: Román Smith MD;  Location: [facility] OR     ORTHOPEDIC SURGERY  9/2015    GT TOE JOINT FUSION - BILATERAL     SURGICAL HISTORY OF -   1976    laparoscopy benign enlarged lymph node       Anesthesia Evaluation     . Pt has had prior anesthetic.     No history of anesthetic complications          ROS/MED HX    ENT/Pulmonary:     (+)Intermittent asthma , . .   (-) tobacco use and sleep apnea   Neurologic:       Cardiovascular:        (-) hypertension and dyslipidemia   METS/Exercise Tolerance:  >4 METS   Hematologic:         Musculoskeletal:         GI/Hepatic:     (+) GERD Asymptomatic on medication,       Renal/Genitourinary:     (+) chronic renal disease, type: CRI, Pt does not require dialysis, Pt has no history of transplant,       Endo:      (-) Type I DM and Type II DM   Psychiatric:         Infectious Disease:         Malignancy:         Other:                          Physical Exam  Normal systems: dental    Airway   Mallampati: II  TM distance: >3 FB  Neck ROM: full    Dental     Cardiovascular   Rhythm and rate: regular  (-) no murmur    Pulmonary    breath sounds clear to auscultation            Lab Results   Component Value Date    WBC 12.4 (H) 09/12/2019    HGB 10.7 (L) 09/12/2019    HCT 32.2 (L) 09/12/2019     09/12/2019    SED 16 07/10/2019     07/21/2019    POTASSIUM 3.8 08/18/2019    CHLORIDE 107 07/21/2019    CO2 28 07/21/2019    BUN 9 07/21/2019    CR 0.54 (A) 08/18/2019     (A) 08/18/2019    CARMEN 8.8 07/21/2019    ALBUMIN 3.5 08/26/2019    PROTTOTAL 6.9 08/26/2019    ALT 47 08/26/2019    AST 43 08/26/2019    ALKPHOS 225 (H) 08/26/2019    BILITOTAL 1.3 08/26/2019    LIPASE 193 07/21/2019    INR 0.96 07/20/2019    TSH 1.85 07/10/2019    T4 1.09  "07/12/2016       Preop Vitals  BP Readings from Last 3 Encounters:   10/14/19 132/61   09/12/19 120/58   08/26/19 120/70    Pulse Readings from Last 3 Encounters:   09/12/19 88   08/26/19 75   08/08/19 69      Resp Readings from Last 3 Encounters:   10/14/19 16   07/24/19 11   07/21/19 16    SpO2 Readings from Last 3 Encounters:   10/14/19 98%   09/12/19 97%   08/08/19 98%      Temp Readings from Last 1 Encounters:   10/14/19 36.2  C (97.1  F) (Oral)    Ht Readings from Last 1 Encounters:   10/14/19 1.6 m (5' 3\")      Wt Readings from Last 1 Encounters:   10/14/19 71.3 kg (157 lb 3.2 oz)    Estimated body mass index is 27.85 kg/m  as calculated from the following:    Height as of this encounter: 1.6 m (5' 3\").    Weight as of this encounter: 71.3 kg (157 lb 3.2 oz).       Anesthesia Plan      History & Physical Review  History and physical reviewed and following examination; no interval change.    ASA Status:  2 .    NPO Status:  > 8 hours    Plan for MAC with Intravenous induction.   PONV prophylaxis:  Ondansetron (or other 5HT-3)       Postoperative Care  Postoperative pain management:  Multi-modal analgesia.      Consents                 Héctor Barton MD  "

## 2019-10-14 NOTE — DISCHARGE INSTRUCTIONS
Same Day Surgery Discharge Instructions for  Sedation and General Anesthesia       It's not unusual to feel dizzy, light-headed or faint for up to 24 hours after surgery or while taking pain medication.  If you have these symptoms: sit for a few minutes before standing and have someone assist you when you get up to walk or use the bathroom.      You should rest and relax for the next 24 hours. We recommend you make arrangements to have an adult stay with you for at least 24 hours after your discharge.  Avoid hazardous and strenuous activity.      DO NOT DRIVE any vehicle or operate mechanical equipment for 24 hours following the end of your surgery.  Even though you may feel normal, your reactions may be affected by the medication you have received.      Do not drink alcoholic beverages for 24 hours following surgery.       Slowly progress to your regular diet as you feel able. It's not unusual to feel nauseated and/or vomit after receiving anesthesia.  If you develop these symptoms, drink clear liquids (apple juice, ginger ale, broth, 7-up, etc. ) until you feel better.  If your nausea and vomiting persists for 24 hours, please notify your surgeon.        All narcotic pain medications, along with inactivity and anesthesia, can cause constipation. Drinking plenty of liquids and increasing fiber intake will help.      For any questions of a medical nature, call your surgeon.      Do not make important decisions for 24 hours.      If you had general anesthesia, you may have a sore throat for a couple of days related to the breathing tube used during surgery.  You may use Cepacol lozenges to help with this discomfort.  If it worsens or if you develop a fever, contact your surgeon.       If you feel your pain is not well managed with the pain medications prescribed by your surgeon, please contact your surgeon's office to let them know so they can address your concerns.           Discharge Instructions for Power Port  Placement      General Instructions:    You will be given a card with information about your port.  You should carry this with you in your wallet/billfold. It provides information to clinicians about your port.  You should also carry the card in case your port triggers any security devices.     Activity:    Limit your activity for the first few days after your port is placed    Incision Care:     Unless otherwise directed by your surgeon, the dressing may removed tomorrow.      If a topical skin adhesive was used to close incision, you may shower tomorrow. Do not use soaps, lotions, or ointments on the wound area. Do not scrub the wound. After bathing, pat the wound dry with a soft towel.  Do not scratch, rub, or pick at the strips or film. Do not place tape directly over the strips or film.  Do not apply liquids (such as peroxide), ointments, or creams to the wound while the strips or film are in place.    Call your surgeon if you have:     Redness, swelling or drainage from incision     A fever of 101 F or greater.      Nausea or vomiting.     Pain that is not controlled by medications and/or rest.     Questions or concerns.

## 2019-10-14 NOTE — ANESTHESIA CARE TRANSFER NOTE
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    Anesthesia Type:   MAC     Note:  Airway :Nasal Cannula  Patient transferred to:PACU  Comments: To [facility] PACU: Awake, good airway, 02 NC, VSS  Report to RNHandoff Report: Identifed the Patient, Identified the Reponsible Provider, Reviewed the pertinent medical history, Discussed the surgical course, Reviewed Intra-OP anesthesia mangement and issues during anesthesia, Set expectations for post-procedure period and Allowed opportunity for questions and acknowledgement of understanding      Vitals: (Last set prior to Anesthesia Care Transfer)    CRNA VITALS  10/14/2019 1100 - 10/14/2019 1138      10/14/2019             NIBP:  120/72    NIBP Mean:  91                Electronically Signed By: CLAIR Ojeda CRNA  October 14, 2019  11:38 AM

## 2019-10-14 NOTE — OR NURSING
Glasses returned.  PNDS met, po per I&O sheet. Pt dressed, up in recliner and transported to Phase 2.

## 2019-10-14 NOTE — ANESTHESIA POSTPROCEDURE EVALUATION
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    Anesthesia Type:  MAC    Note:  Anesthesia Post Evaluation    Patient location during evaluation: PACU  Patient participation: Able to fully participate in evaluation  Level of consciousness: awake and alert  Pain management: adequate  Airway patency: patent  Cardiovascular status: acceptable and stable  Respiratory status: acceptable, spontaneous ventilation, unassisted and nonlabored ventilation  Hydration status: acceptable  PONV: none             Last vitals:  Vitals:    10/14/19 1200 10/14/19 1215 10/14/19 1332   BP: 124/72 132/84 (!) 143/77   Pulse: 52 56    Resp: 13 12 16   Temp:      SpO2: 98% 100% 99%         Electronically Signed By: Héctor Barton MD  October 14, 2019  2:02 PM

## 2019-10-14 NOTE — ADDENDUM NOTE
Addendum  created 10/14/19 1824 by Héctor Barton MD    Attestation recorded in Intraprocedure, Intraprocedure Attestations filed

## 2019-10-16 ENCOUNTER — TRANSFERRED RECORDS (OUTPATIENT)
Dept: HEALTH INFORMATION MANAGEMENT | Facility: CLINIC | Age: 64
End: 2019-10-16

## 2019-11-21 ENCOUNTER — TRANSFERRED RECORDS (OUTPATIENT)
Dept: HEALTH INFORMATION MANAGEMENT | Facility: CLINIC | Age: 64
End: 2019-11-21

## 2019-12-16 ENCOUNTER — HEALTH MAINTENANCE LETTER (OUTPATIENT)
Age: 64
End: 2019-12-16

## 2020-01-16 ENCOUNTER — TRANSFERRED RECORDS (OUTPATIENT)
Dept: HEALTH INFORMATION MANAGEMENT | Facility: CLINIC | Age: 65
End: 2020-01-16

## 2020-01-26 DIAGNOSIS — F51.04 PSYCHOPHYSIOLOGICAL INSOMNIA: Primary | ICD-10-CM

## 2020-01-27 NOTE — TELEPHONE ENCOUNTER
"Requested Prescriptions   Pending Prescriptions Disp Refills     traZODone (DESYREL) 50 MG tablet [Pharmacy Med Name: traZODone HCl 50 MG Oral Tablet]  0     Sig: TAKE 1 TO 2 TABLETS BY MOUTH BEFORE BED AS NEEDED FOR SLEEP       Serotonin Modulators Passed - 1/26/2020 11:59 AM        Passed - Recent (12 mo) or future (30 days) visit within the authorizing provider's specialty     Patient has had an office visit with the authorizing provider or a provider within the authorizing providers department within the previous 12 mos or has a future within next 30 days. See \"Patient Info\" tab in inbasket, or \"Choose Columns\" in Meds & Orders section of the refill encounter.              Passed - Medication is active on med list        Passed - Patient is age 18 or older        Passed - No active pregnancy on record        Passed - No positive pregnancy test in past 12 months      traZODone (DESYREL) 50 MG tablet    Last Written Prescription Date:  na  Last Fill Quantity: na,  # refills: na   Last office visit: 9/12/2019 with prescribing provider:  Dr. Jacobson    Future Office Visit: Unknown   Next 5 appointments (look out 90 days)    Feb 07, 2020  3:15 PM CST  Nurse Only with EC MA/LPN  [facility] [place] [address] [phone]           "

## 2020-01-28 RX ORDER — TRAZODONE HYDROCHLORIDE 50 MG/1
50-100 TABLET, FILM COATED ORAL AT BEDTIME
Qty: 90 TABLET | Refills: 3 | Status: SHIPPED | OUTPATIENT
Start: 2020-01-28 | End: 2020-11-20

## 2020-01-28 NOTE — TELEPHONE ENCOUNTER
Routing refill request to provider for review/approval because:  Medication is reported/historical    CHLOE LouieN, RN  [employer] Triage

## 2020-02-20 ENCOUNTER — TRANSFERRED RECORDS (OUTPATIENT)
Dept: HEALTH INFORMATION MANAGEMENT | Facility: CLINIC | Age: 65
End: 2020-02-20

## 2020-02-21 ENCOUNTER — NURSE TRIAGE (OUTPATIENT)
Dept: FAMILY MEDICINE | Facility: CLINIC | Age: 65
End: 2020-02-21

## 2020-02-21 NOTE — TELEPHONE ENCOUNTER
Pt had chemo yesterday and last night developed hemorrhoids.  Wondering if she can get something without having to be seen?  States she has been constipated and takes miralax daily but for the past couple of days she has been having diarrhea and having more stools.      Pharmacy pended.    Pt can be reached at [phone].    Cecy HOLLOWAY RN  EP Triage          WARM SALINE SITZ BATH - HOW TO MAKE A SITZ BATH:  * Here is how you can make a saline sitz bath.  * Fill the tub with warm water until it is 3-4 inches (7-10 cm) deep.  * Add 1/4 cup (80 g) of table salt or baking soda to a tub of warm water. Stir the water until it dissolves.      CALL BACK IF:  * Severe rectal pain  * Rectal pain not improved after 3 days  * Rectal bleeding is more than minor (e.g., more than just blood on toilet paper or few drops)  * Rectal bleeding is minor but is ongoing (e.g., occurs over 2 times)  * You become worse.      Patient/Caregiver understands and will follow care advice? Yes, plans to follow advice       Additional Information    Negative: Sounds like a life-threatening emergency to the triager    Negative: Diarrhea is the main symptom    Negative: Constipation is the main symptom (e.g., pain or discomfort caused by passage of hard BMs)    Negative: Blood in or on bowel movement is the main symptom    Negative: Sexual assault    Negative: Injury to rectum    Negative: Patient sounds very sick or weak to the triager    Negative: Severe rectal pain    Negative: Rectal pain or redness and fever > 100.5 F (38.1 C)    Negative: Acute onset rectal pain and constipation (straining with rectal pressure or fullness), which is not relieved by Sitz bath or suppository    Negative: MODERATE-SEVERE rectal pain (i.e., interferes with school, work, or sleep)    Negative: MODERATE-SEVERE rectal itching (i.e., interferes with school, work, or sleep)    Negative: Last bowel movement (BM) > 4 days ago    Negative: Rectal area looks infected  "(e.g., draining sore, spreading redness)    Negative: Rash of rectal area (e.g., open sore, painful tiny water blisters, unexplained bumps)    Negative: Caller is worried about a sexually transmitted disease (STD)    Negative: Home treatment > 3 days for rectal pain and not improved    Negative: Home treatment for > 3 days for rectal itching and not improved    Negative: Patient wants to be seen    Negative: Recurrent episodes of unexplained rectal pain, but NO rectal symptoms now    Negative: Painless lump in rectal area    Mild rectal pain    Answer Assessment - Initial Assessment Questions  1. SYMPTOM:  \"What's the main symptom you're concerned about?\" (e.g., pain, itching, swelling, rash)      Bulging, tender all the way around the anal area  2. ONSET: \"When did the hemorrhoids  start?\"      Last night  3. RECTAL PAIN: \"Do you have any pain around your rectum?\" \"How bad is the pain?\"  (Scale 1-10; or mild, moderate, severe)   - MILD (1-3): doesn't interfere with normal activities    - MODERATE (4-7): interferes with normal activities or awakens from sleep, limping    - SEVERE (8-10): excruciating pain, unable to have a bowel movement       4/10  4. RECTAL ITCHING: \"Do you have any itching in this area?\" \"How bad is the itching?\"  (Scale 1-10; or mild, moderate, severe)   - MILD - doesn't interfere with normal activities    - MODERATE-SEVERE: interferes with normal activities or awakens from sleep      No itching  5. CONSTIPATION: \"Do you have constipation?\" If so, \"How bad is it?\"      Yes due to chemo but for the past 2 days has been more diarrhea and having more stools  6. CAUSE: \"What do you think is causing the anus symptoms?\"      Having more stools  7. OTHER SYMPTOMS: \"Do you have any other symptoms?\"  (e.g., rectal bleeding, abdominal pain, vomiting, fever)      no  8. PREGNANCY: \"Is there any chance you are pregnant?\" \"When was your last menstrual period?\"      no    Protocols used: RECTAL " SYMPTOMS-A-[place]

## 2020-02-21 NOTE — TELEPHONE ENCOUNTER
I would suggest patient to use Preparation H which is over-the-counter steroid cream which might help.  Sitz bath.  If any worsening please follow-up in the clinic.

## 2020-02-21 NOTE — TELEPHONE ENCOUNTER
Reason for Call:  Other prescription    Detailed comments: Pt is calling to speak with a triage nurse. Stated she had chemotherapy yesterday and is experiences hemorrhoids as a reaction. Wondering if there is something she can be prescribed without having to make an OV since she is in a lot of pain. Please give the Pt a call back when possible. Thanks!    Phone Number Patient can be reached at: Cell number on file:    Telephone Information:   Mobile [phone]       Best Time: today    Can we leave a detailed message on this number? YES    Call taken on 2/21/2020 at 8:31 AM by Opal Lu

## 2020-02-21 NOTE — TELEPHONE ENCOUNTER
Detailed message left with info from provider and return number to call with any questions or concerns.    Cecy HOLLOWAY RN  EP Triage

## 2020-03-12 ENCOUNTER — TRANSFERRED RECORDS (OUTPATIENT)
Dept: HEALTH INFORMATION MANAGEMENT | Facility: CLINIC | Age: 65
End: 2020-03-12

## 2020-05-08 ENCOUNTER — TRANSFERRED RECORDS (OUTPATIENT)
Dept: HEALTH INFORMATION MANAGEMENT | Facility: CLINIC | Age: 65
End: 2020-05-08

## 2020-05-11 ENCOUNTER — TRANSFERRED RECORDS (OUTPATIENT)
Dept: HEALTH INFORMATION MANAGEMENT | Facility: CLINIC | Age: 65
End: 2020-05-11

## 2020-07-02 ENCOUNTER — HOSPITAL ENCOUNTER (OUTPATIENT)
Dept: MAMMOGRAPHY | Facility: CLINIC | Age: 65
Discharge: HOME | End: 2020-07-02
Attending: INTERNAL MEDICINE | Admitting: INTERNAL MEDICINE
Payer: COMMERCIAL

## 2020-07-02 DIAGNOSIS — Z12.31 SCREENING MAMMOGRAM, ENCOUNTER FOR: ICD-10-CM

## 2020-07-02 PROCEDURE — 77063 BREAST TOMOSYNTHESIS BI: CPT

## 2020-07-19 DIAGNOSIS — M51.369 DDD (DEGENERATIVE DISC DISEASE), LUMBAR: ICD-10-CM

## 2020-07-19 DIAGNOSIS — M48.00 CENTRAL SPINAL STENOSIS: Primary | ICD-10-CM

## 2020-07-20 RX ORDER — GABAPENTIN 300 MG/1
CAPSULE ORAL
Qty: 180 CAPSULE | Refills: 0 | Status: SHIPPED | OUTPATIENT
Start: 2020-07-20 | End: 2020-10-28

## 2020-07-20 NOTE — TELEPHONE ENCOUNTER
Routing refill request to provider for review/approval because:  Drug not on the [facility] refill protocol     Kellie Whitten RN, BSN  [facility] [place]

## 2020-08-12 ENCOUNTER — OFFICE VISIT (OUTPATIENT)
Dept: FAMILY MEDICINE | Facility: CLINIC | Age: 65
End: 2020-08-12
Payer: COMMERCIAL

## 2020-08-12 VITALS
SYSTOLIC BLOOD PRESSURE: 118 MMHG | DIASTOLIC BLOOD PRESSURE: 62 MMHG | OXYGEN SATURATION: 98 % | TEMPERATURE: 96.8 F | BODY MASS INDEX: 26.93 KG/M2 | WEIGHT: 152 LBS | HEART RATE: 58 BPM

## 2020-08-12 DIAGNOSIS — Z11.59 ENCOUNTER FOR SCREENING FOR OTHER VIRAL DISEASES: Primary | ICD-10-CM

## 2020-08-12 DIAGNOSIS — C24.1 AMPULLARY CARCINOMA (H): ICD-10-CM

## 2020-08-12 DIAGNOSIS — H81.10 BENIGN PAROXYSMAL POSITIONAL VERTIGO, UNSPECIFIED LATERALITY: Primary | ICD-10-CM

## 2020-08-12 PROCEDURE — 99213 OFFICE O/P EST LOW 20 MIN: CPT | Performed by: NURSE PRACTITIONER

## 2020-08-12 RX ORDER — MECLIZINE HYDROCHLORIDE 25 MG/1
25 TABLET ORAL 3 TIMES DAILY PRN
Qty: 24 TABLET | Refills: 1 | Status: SHIPPED | OUTPATIENT
Start: 2020-08-12

## 2020-08-12 ASSESSMENT — PATIENT HEALTH QUESTIONNAIRE - PHQ9
SUM OF ALL RESPONSES TO PHQ QUESTIONS 1-9: 7
5. POOR APPETITE OR OVEREATING: NOT AT ALL

## 2020-08-12 ASSESSMENT — ANXIETY QUESTIONNAIRES
3. WORRYING TOO MUCH ABOUT DIFFERENT THINGS: NOT AT ALL
1. FEELING NERVOUS, ANXIOUS, OR ON EDGE: NOT AT ALL
2. NOT BEING ABLE TO STOP OR CONTROL WORRYING: NOT AT ALL
5. BEING SO RESTLESS THAT IT IS HARD TO SIT STILL: NOT AT ALL
6. BECOMING EASILY ANNOYED OR IRRITABLE: NOT AT ALL

## 2020-08-12 NOTE — PATIENT INSTRUCTIONS
Patient Education     Managing Balance Problems: Vestibular Rehabilitation Therapy    An inner ear problem can knock out part of the balance system. Vestibular rehabilitation therapy helps you learn how to rely on specific parts of your balance system.  Checking eye movement  The therapist will check for nystagmus. This is an automatic, jumpy eye movement. Nystagmus in certain positions can mean an inner ear problem. It can even show which semicircular canal is affected. The therapist will watch your eyes while you move into different positions.  Treating your condition  Treatment can include:    Canalith repositioning. This is a series of guided head and body movements. It helps move crystals, easing symptoms of benign positional vertigo (BPV).    Habituation exercises to retrain your balance system. The therapist will teach you how to do these exercises at home.    Gaze stabilization exercises. These are to retrain the eyes to stay in focus while your head moves. This helps ease dysequilibrium.    Gait and balance training. This includes standing and walking on different surfaces. The therapist can teach you how to keep your balance and prevent falls.  Doing habituation exercises  The therapist will teach exercises suited to your condition. Habituation exercises will make you dizzy at first. Just remind yourself that symptoms probably will last for only a minute. If you keep doing the exercises, they will help lessen your dizziness. Then when you do a similar movement in daily life, it is less likely to bring on symptoms.  Getting back into action  Dizziness doesn't have to keep you from exercising. Start with activities that don't make you dizzy. Then ease into more challenging activities. Try these tips:    Always exercise with a partner.    Stop if you have nausea or faintness.    Walk on a treadmill, holding on to the handles for support.    Use a ball machine for sports like tennis until you're ready for a  live game. This way you know where to expect the ball.    Don't give up. With time, most people can return to activities and sports.  Date Last Reviewed: 11/1/2016 2000-2019 [employer]. [address] [place] [address], [place] [address]. All rights reserved. This information is not intended as a substitute for professional medical care. Always follow your healthcare professional's instructions.             The Epley maneuver is performed as follows:  You are seated, and the doctor turns your head 45 degrees horizontally toward the affected ear. You should hold the doctor's arms for support.  The doctor tilts you backward to a horizontal position with your head kept in place at a 45-degree turn, hanging. An attack of vertigo is likely as the debris moves toward the apex of the canal. You are held in this position until the vertigo stops, usually within a minute.  The doctor turns your head 90 degrees toward the unaffected ear. The doctor then rolls you onto the side of the unaffected ear, so that you are now looking at the floor. The debris should move in the canal again, possibly provoking another attack of vertigo. You should remain in this position until the vertigo stops, usually within a minute.  The doctor helps you back to a seated position.      The Semont maneuver is performed as follows:  You are seated, and the doctor turns your head so that it is retirement between looking straight ahead and looking away from the side that causes the worst vertigo.  The doctor then lowers you quickly to the side that causes the worst vertigo. When your head is on the table, you are looking up at the ceiling. The doctor holds you in this position for 30 seconds.  The doctor then quickly moves you to the other side of the table without stopping in the upright position. When your head is on the table, you are now looking down at the table. The doctor holds you in this position for 30 seconds.  The doctor then helps you  sit back up.

## 2020-08-12 NOTE — PROGRESS NOTES
Subjective     Mckayla Grady is a 65 year old female who presents to clinic today for the following health issues:      Dizziness  Onset: saturday     Description:   Do you feel faint:  no   Does it feel like the surroundings (bed, room) are moving: YES  Unsteady/off balance: YES  Have you passed out or fallen: no     Intensity: has been improving, but severe on Sat.     Progression of Symptoms:  improving    Accompanying Signs & Symptoms:  Heart palpitations: no   Nausea, vomiting: YES- on Sunday   Weakness in arms or legs: no   Fatigue: no   Vision or speech changes: no   Ringing in ears (Tinnitus): no   Hearing Loss: no     History:   Head trauma/concussion hx: no   Previous similar symptoms: YES  Recent bleeding history: no     Precipitating factors:   Worse with activity or head movement: YES  Any new medications (BP?): no   Alcohol/drug abuse/withdrawal: no     Alleviating factors:   Does staying in a fixed position give relief:  YES    Therapies Tried and outcome: head maneuvering      HPI: Mckayla presents today with the complaint of dizziness. She relates that she has suffered with periodic spells of vertigo in the past, but they have never lasted this long. She states that her symptoms began over the weekend (Saturday was quite bad), and they have slowly improved since that time. No lightheadedness, feeling faint, vision changes, chest pain, perception of rapid or irregular heart beat, or other focal neurologic symptoms. No new medications or routines. Symptoms occur with sudden movements and resolve when sitting still. No recent sinus or ear infection symptoms.     Patient Active Problem List   Diagnosis     Calculus of kidney     Esophageal reflux     Family history of colon cancer     Colon polyps     DDD (degenerative disc disease), lumbar     Central spinal stenosis     Overweight     S/P complete hysterectomy     Anxiety state     Elevated TSH     S/P lumbar fusion     Ampullary carcinoma (H)     Past  Surgical History:   Procedure Laterality Date     ABDOMEN SURGERY      csection X3     ABDOMEN SURGERY  08/2019    whipple resection     APPENDECTOMY  1963     C EXPLORE/TREAT ANKLE JOINT  1989     C TOTAL ABDOM HYSTERECTOMY  2002    with bilat ovary removal     COLONOSCOPY  4/2009     COLONOSCOPY  4/2012    Repeat in 5 years     DISCECTOMY LUMBAR POSTERIOR MICROSCOPIC ONE LEVEL Right 1/8/2015    Procedure: DISCECTOMY LUMBAR POSTERIOR MICROSCOPIC ONE LEVEL;  Surgeon: Román Smith MD;  Location:  OR     DISKECTOMY, LUMBAR, SINGLE SP  2001    X 2     DISKECTOMY, LUMBAR, SINGLE SP  2006      ENDOSCOPIC RETROGRADE CHOLANGIOPANCREATOGRAM N/A 7/20/2019    Procedure: ENDOSCOPIC RETROGRADE CHOLANGIOPANCREATPGRAPHY, SPINCTEROTOMY, BALLOON SWEEP, AMPILLARY BIOPSY, STENT PLACEMENT, SPHINCTEROTOMY;  Surgeon: Hansel Tatum MD;  Location:  OR     ENDOSCOPIC RETROGRADE CHOLANGIOPANCREATOGRAM N/A 7/24/2019    Procedure: ENDOSCOPIC RETROGRADE CHOLANGIOPANCREATOGRAPHY, COMMON BILE DUCT BRUSHINGS AND 10f/7CM STENT PLACEMENT X 2 STENTS, BIOPSIES OF AMPULA  MASS AND;  Surgeon: Michelle Leahy MD;  Location:  OR     ENDOSCOPIC ULTRASOUND UPPER GASTROINTESTINAL TRACT (GI) N/A 7/24/2019    Procedure: ENDOSCOPIC ULTRASOUND, ESOPHAGOSCOPY / UPPER GASTROINTESTINAL TRACT, STENT REMOVAL, AND FNA / BIOPIES OF AMPULA  MASS;  Surgeon: Michelle Leahy MD;  Location:  OR     HC REVISE MEDIAN N/CARPAL TUNNEL SURG  2002     HYSTERECTOMY, PAP NO LONGER INDICATED       INSERT PORT VASCULAR ACCESS N/A 10/14/2019    Procedure: PORT PLACEMENT, Left Subclavian;  Surgeon: Papa May MD;  Location:  OR     OPTICAL TRACKING SYSTEM FUSION SPINE POSTERIOR LUMBAR TWO LEVELS N/A 8/4/2016    Procedure: OPTICAL TRACKING SYSTEM FUSION SPINE POSTERIOR LUMBAR TWO LEVELS;  Surgeon: Román Smith MD;  Location:  OR     ORTHOPEDIC SURGERY  9/2015    GT TOE JOINT FUSION - BILATERAL     SURGICAL  HISTORY OF -   1976    laparoscopy benign enlarged lymph node       Social History     Tobacco Use     Smoking status: Never Smoker     Smokeless tobacco: Never Used   Substance Use Topics     Alcohol use: Not Currently     Alcohol/week: 0.0 standard drinks     Comment: 6/week     Family History   Problem Relation Age of Onset     Heart Disease Father         CHF     Arthritis Father      Eye Disorder Father         glaucoma     C.A.D. Father         CABG     Genetic Disorder Father      Cancer - colorectal Mother      Cerebrovascular Disease Maternal Grandmother      Eye Disorder Paternal Grandmother         glaucoma     Diabetes No family hx of      Hypertension No family hx of      Breast Cancer No family hx of      Alzheimer Disease No family hx of      Anesthesia Reaction No family hx of      Blood Disease No family hx of      Neurologic Disorder No family hx of      Thyroid Disease No family hx of      Osteoporosis No family hx of            Reviewed and updated as needed this visit by Provider  Tobacco  Allergies  Meds  Problems  Med Hx  Surg Hx  Fam Hx         Review of Systems   Constitutional, HEENT, cardiovascular, pulmonary, neuro systems are negative, except as otherwise noted.      Objective    /62 (BP Location: Left arm, Patient Position: Chair, Cuff Size: Adult Regular)   Pulse 58   Temp 96.8  F (36  C) (Tympanic)   Wt 68.9 kg (152 lb)   LMP 05/21/2002   SpO2 98%   BMI 26.93 kg/m    Body mass index is 26.93 kg/m .  Physical Exam   GENERAL: healthy, alert and no distress  NEURO: Normal strength and tone, mentation intact and speech normal    Diagnostic Test Results:  Labs reviewed in Epic        Assessment & Plan     Mckayla was seen today for dizziness. History and exam suggestive of BPPV. Discussed this at length, including pathophysiology, expected course, and treatment strategies. We will try home Epley and Semont Maneuvers, as well as prn meclizine. Reassuring that she seems to be  "improving with each day. Vital signs are stable. She will let me know next week if she is no better, and we can order formal vestibular therapy (or investigate for other potential drivers of her symptoms). No red flags today. Discussed reasons to call or return to clinic. Mckayla acknowledges and demonstrates understanding of circumstances under which care should be sought urgently or emergently. Follow up as discussed. Discussed risks, benefits, alternatives, potential side effects, and proper administration of new medication / treatment. Agrees with plan of care. All questions answered.      Diagnoses and all orders for this visit:    Benign paroxysmal positional vertigo, unspecified laterality  -     meclizine (ANTIVERT) 25 MG tablet; Take 1 tablet (25 mg) by mouth 3 times daily as needed for dizziness    Ampullary carcinoma (H)  Comment: Had Whipple and chemotherapy last year. Will have follow up CT soon. No acute issues or complaints.          BMI:   Estimated body mass index is 26.93 kg/m  as calculated from the following:    Height as of 10/14/19: 1.6 m (5' 3\").    Weight as of this encounter: 68.9 kg (152 lb).         See Patient Instructions    Return in about 1 week (around 8/19/2020) for persistent or worsening symptoms.    Gael Jacobson NP  [facility]  "

## 2020-08-24 DIAGNOSIS — Z11.59 ENCOUNTER FOR SCREENING FOR OTHER VIRAL DISEASES: ICD-10-CM

## 2020-08-24 PROCEDURE — U0003 INFECTIOUS AGENT DETECTION BY NUCLEIC ACID (DNA OR RNA); SEVERE ACUTE RESPIRATORY SYNDROME CORONAVIRUS 2 (SARS-COV-2) (CORONAVIRUS DISEASE [COVID-19]), AMPLIFIED PROBE TECHNIQUE, MAKING USE OF HIGH THROUGHPUT TECHNOLOGIES AS DESCRIBED BY CMS-2020-01-R: HCPCS | Performed by: THORACIC SURGERY (CARDIOTHORACIC VASCULAR SURGERY)

## 2020-08-25 ENCOUNTER — OFFICE VISIT (OUTPATIENT)
Dept: INTERNAL MEDICINE | Facility: CLINIC | Age: 65
End: 2020-08-25
Payer: COMMERCIAL

## 2020-08-25 VITALS
OXYGEN SATURATION: 98 % | SYSTOLIC BLOOD PRESSURE: 126 MMHG | BODY MASS INDEX: 27.28 KG/M2 | DIASTOLIC BLOOD PRESSURE: 70 MMHG | WEIGHT: 154 LBS | TEMPERATURE: 97.1 F | HEART RATE: 58 BPM | RESPIRATION RATE: 16 BRPM

## 2020-08-25 DIAGNOSIS — Z01.818 PREOP GENERAL PHYSICAL EXAM: Primary | ICD-10-CM

## 2020-08-25 DIAGNOSIS — C24.1 AMPULLARY CARCINOMA (H): ICD-10-CM

## 2020-08-25 LAB
CREAT SERPL-MCNC: 0.63 MG/DL (ref 0.52–1.04)
GFR SERPL CREATININE-BSD FRML MDRD: >90 ML/MIN/{1.73_M2}
HGB BLD-MCNC: 12.4 G/DL (ref 11.7–15.7)
PLATELET # BLD AUTO: 216 10E9/L (ref 150–450)
SARS-COV-2 RNA SPEC QL NAA+PROBE: NOT DETECTED
SPECIMEN SOURCE: NORMAL

## 2020-08-25 PROCEDURE — 82565 ASSAY OF CREATININE: CPT | Performed by: PHYSICIAN ASSISTANT

## 2020-08-25 PROCEDURE — 99214 OFFICE O/P EST MOD 30 MIN: CPT | Performed by: PHYSICIAN ASSISTANT

## 2020-08-25 PROCEDURE — 85018 HEMOGLOBIN: CPT | Performed by: PHYSICIAN ASSISTANT

## 2020-08-25 PROCEDURE — 85049 AUTOMATED PLATELET COUNT: CPT | Performed by: PHYSICIAN ASSISTANT

## 2020-08-25 PROCEDURE — 36415 COLL VENOUS BLD VENIPUNCTURE: CPT | Performed by: PHYSICIAN ASSISTANT

## 2020-08-25 NOTE — PATIENT INSTRUCTIONS
"No vitamins or supplements the morning of surgery       Preparing for Your Surgery  Getting started  A surgery nurse will call you to review your health history and instructions. They will give you an arrival time based on your scheduled surgery time.  Please be ready to share the following:    Your doctor's clinic name and phone number    Your medical, surgical and anesthesia history    A list of allergies and sensitivities    A list of medicines, including herbal treatments and over-the-counter drugs    Whether the patient has a legal guardian (ask how to send us the papers in advance)  If your child is having surgery, please ask for a copy of Preparing for Your Child's Surgery.    Preparing for surgery    Within 30 days of surgery: Have an exam at your family clinic (primary care clinic), or go to a pre-operative clinic. This exam is called a \"History and Physical,\" or H&P.    At your H&P exam, talk to your care team about all medicines you take. If you need to stop any medicines before surgery, ask when to start taking them again.  ? We do this for your safety. Many medicines can make you bleed too much during surgery. Some change how well surgery (anesthesia) drugs work.    Call your insurance company to see what it will and won't pay for. Ask if they need to pre-approve the surgery. (If no insurance, call [phone].)    Call your surgeon's clinic if there's any change in your health. This includes signs of a cold or flu (sore throat, runny nose, cough, rash, fever). It also includes a scrape or scratch near the surgery site.    If you have questions on the day of surgery, call your surgery center.  Eating and drinking guidelines  For your safety: Unless your surgeon tells you otherwise, follow the guidelines below.    Eat and drink as usual until 8 hours before surgery. After that, no food or milk.    Drink clear liquids until 2 hours before surgery. These are liquids you can see through, like water, " Gatorade and Propel Water. You may also have black coffee and tea (no cream or milk).    Nothing by mouth within 2 hours of surgery. This includes gum, candy and breath mints.    Stop alcohol the midnight before surgery.    If your family clinic tells you to take medicine on the morning of surgery, it's okay to take it with a sip of water.  Preventing infection    Shower or bathe the night before and morning of your surgery. Follow the instructions your clinic gave you. (If no instructions, use regular soap.)    Don't shave or clip hair near your surgery site. This can lead to skin infection.    Don't smoke the morning of surgery. Smoking increases the risk of infection. You may chew nicotine gum up to 2 hours before surgery. A nicotine patch is okay.  ? Note: Some surgeries require you to completely quit smoking and nicotine. Check with your surgeon.    Your care team will make every effort to keep you safe from infection. We will:  ? Clean our hands often with soap and water (or an alcohol-based hand rub).  ? Clean the skin at your surgery site with a special soap that kills germs. We'll also remove hair from the site as needed.  ? Wear special hair covers, masks, gowns and gloves during surgery.  ? Give antibiotic medicine, if prescribed. Not all surgeries need antibiotics.  What to bring on the day of surgery    Photo ID and insurance card    Copy of your health care directive, if you have one    Glasses and hearing aides (bring cases)  ? You can't wear contacts during surgery    Inhaler and eye drops, if you use them (tell us about these when you arrive)    CPAP machine or breathing device, if you use them    A few personal items, if spending the night    If you have . . .  ? A pacemaker or ICD (cardiac defibrillator): Bring the ID card.  ? An implanted stimulator: Bring the remote control.  ? A legal guardian: Bring a copy of the certified (court-stamped) guardianship papers.  Please remove any jewelry,  including body piercings. Leave jewelry and other valuables at home.  If you're going home the day of surgery  Important: If you don't follow the rules below, we must cancel your surgery.     Arrange for someone to drive you home after surgery. You may not drive, take a taxi or take public transportation by yourself (unless you'll have local anesthesia only).    Arrange for a responsible adult to stay with you overnight. If you don't, we may keep you in the hospital overnight, and you may need to pay the costs yourself.  Questions?   If you have any questions for your care team, list them here: _________________________________________________________________________________________________________________________________________________________________________________________________________________________________________________________________________________________________________________________  For informational purposes only. Not to replace the advice of your health care provider. Copyright   1984-6348 [facility]. All rights reserved. Clinically reviewed by Britney Salazar MD. [employer] [id number] - REV 07/19.

## 2020-08-25 NOTE — PROGRESS NOTES
[address] [address]  Phone: [phone]  Primary Provider: [facility]  Pre-op Performing Provider: RITA HAYNES    PREOPERATIVE EVALUATION:  Today's date: 8/25/2020    Mckayla Grady is a 65 year old female who presents for a preoperative evaluation.    Surgical Information:  Surgery Details 8/25/2020   Surgery/Procedure: Port removal   Surgery Location: [facility]   Surgeon: Lalo   Surgery Date: 8/28/20   Time of Surgery: 12:00 PM   Where patient plans to recover: At home with family     Fax number for surgical facility: Note does not need to be faxed, will be available electronically in Epic.  Type of Anesthesia Anticipated: Local with MAC    Subjective     HPI related to upcoming procedure: port removal s/p cancer treatment/chemo for Ampulla carcinoma  Preop Questions 8/25/2020   Have you ever had a heart attack or stroke? No   Have you ever had surgery on your heart or blood vessels, such as a stent placement, a coronary artery bypass, or surgery on an artery in your head, neck, heart, or legs? No   Do you have chest pain with activity? No   Do you have a history of  heart failure? No   Do you currently have a cold, bronchitis or symptoms of other infection? No   Do you have a cough, shortness of breath, or wheezing? No   Do you or anyone in your family have previous history of blood clots? No   Do you or does anyone in your family have a serious bleeding problem such as prolonged bleeding following surgeries or cuts? No   Have you ever had problems with anemia or been told to take iron pills? YES - Years ago   Have you had any abnormal blood loss such as black, tarry or bloody stools, or abnormal vaginal bleeding? No   Have you ever had a blood transfusion? No   Are you willing to have a blood transfusion if it is medically needed before, during, or after your surgery? Yes   Have you or any of your relatives  ever had problems with anesthesia? No   Do you have sleep apnea, excessive snoring or daytime drowsiness? No   Do you have any artifical heart valves or other implanted medical devices like a pacemaker, defibrillator, or continuous glucose monitor? No   Do you have artificial joints? No   Are you allergic to latex? No   Is there any chance that you may be pregnant? No     Patient does not have a Health Care Directive or Living Will: Patient states has Advance Directive and will bring in a copy to clinic.    [place] reviewed:  reviewed- no concerns    See problem list for active medical problems.  Problems all longstanding and stable, except as noted/documented.  See ROS for pertinent symptoms related to these conditions.      Review of Systems  CONSTITUTIONAL: NEGATIVE for fever, chills, change in weight  INTEGUMENTARY/SKIN: NEGATIVE for worrisome rashes, moles or lesions  ENT/MOUTH: NEGATIVE for ear, mouth and throat problems  RESP: NEGATIVE for significant cough or SOB  CV: NEGATIVE for chest pain, palpitations or peripheral edema  GI: NEGATIVE for nausea, abdominal pain, heartburn, or change in bowel habits  MUSCULOSKELETAL: NEGATIVE for significant arthralgias or myalgia  ENDOCRINE: NEGATIVE for temperature intolerance, skin/hair changes  HEME/ALLERGY/IMMUNE: NEGATIVE for bleeding problems  PSYCHIATRIC: NEGATIVE for changes in mood or affect  ROS otherwise negative    Patient Active Problem List    Diagnosis Date Noted     Ampullary carcinoma (H) 08/08/2019     Priority: Medium     S/P lumbar fusion 08/04/2016     Priority: Medium     Elevated TSH 07/12/2016     Priority: Medium     Anxiety state 04/03/2015     Priority: Medium     Problem list name updated by automated process. Provider to review       S/P complete hysterectomy 01/06/2015     Priority: Medium     paps no longer indicated       Overweight 10/21/2014     Priority: Medium     Problem list name updated by automated process.  Provider to review       DDD (degenerative disc disease), lumbar 01/08/2014     Priority: Medium     Central spinal stenosis 01/08/2014     Priority: Medium     Family history of colon cancer 05/07/2009     Priority: Medium     Colon polyps 05/07/2009     Priority: Medium     Adenomatous polyps       Esophageal reflux 06/16/2006     Priority: Medium     Calculus of kidney 10/05/2005     Priority: Medium      Past Medical History:   Diagnosis Date     Esophageal reflux      Hallux rigidus     bilatateral     Insomnia, unspecified      Numbness and tingling     localized to LLE; > TOP OF FOOT & CALF AREAS     Primary adenocarcinoma of ampulla of Vater (H)      Seasonal allergies      Past Surgical History:   Procedure Laterality Date     ABDOMEN SURGERY      csection X3     ABDOMEN SURGERY  08/2019    whipple resection     APPENDECTOMY  1963     C EXPLORE/TREAT ANKLE JOINT  1989     C TOTAL ABDOM HYSTERECTOMY  2002    with bilat ovary removal     COLONOSCOPY  4/2009     COLONOSCOPY  4/2012    Repeat in 5 years     DISCECTOMY LUMBAR POSTERIOR MICROSCOPIC ONE LEVEL Right 1/8/2015    Procedure: DISCECTOMY LUMBAR POSTERIOR MICROSCOPIC ONE LEVEL;  Surgeon: Roámn Smith MD;  Location:  OR     DISKECTOMY, LUMBAR, SINGLE SP  2001    X 2     DISKECTOMY, LUMBAR, SINGLE SP  2006      ENDOSCOPIC RETROGRADE CHOLANGIOPANCREATOGRAM N/A 7/20/2019    Procedure: ENDOSCOPIC RETROGRADE CHOLANGIOPANCREATPGRAPHY, SPINCTEROTOMY, BALLOON SWEEP, AMPILLARY BIOPSY, STENT PLACEMENT, SPHINCTEROTOMY;  Surgeon: Hansel Tatum MD;  Location:  OR     ENDOSCOPIC RETROGRADE CHOLANGIOPANCREATOGRAM N/A 7/24/2019    Procedure: ENDOSCOPIC RETROGRADE CHOLANGIOPANCREATOGRAPHY, COMMON BILE DUCT BRUSHINGS AND 10f/7CM STENT PLACEMENT X 2 STENTS, BIOPSIES OF AMPULA  MASS AND;  Surgeon: Michelle Leahy MD;  Location:  OR     ENDOSCOPIC ULTRASOUND UPPER GASTROINTESTINAL TRACT (GI) N/A 7/24/2019    Procedure: ENDOSCOPIC  ULTRASOUND, ESOPHAGOSCOPY / UPPER GASTROINTESTINAL TRACT, STENT REMOVAL, AND FNA / BIOPIES OF AMPULA  MASS;  Surgeon: Michelle Leahy MD;  Location:  OR     HC REVISE MEDIAN N/CARPAL TUNNEL SURG  2002     HYSTERECTOMY, PAP NO LONGER INDICATED       INSERT PORT VASCULAR ACCESS N/A 10/14/2019    Procedure: PORT PLACEMENT, Left Subclavian;  Surgeon: Papa May MD;  Location:  OR     OPTICAL TRACKING SYSTEM FUSION SPINE POSTERIOR LUMBAR TWO LEVELS N/A 8/4/2016    Procedure: OPTICAL TRACKING SYSTEM FUSION SPINE POSTERIOR LUMBAR TWO LEVELS;  Surgeon: Román Smith MD;  Location:  OR     ORTHOPEDIC SURGERY  9/2015    GT TOE JOINT FUSION - BILATERAL     SURGICAL HISTORY OF -   1976    laparoscopy benign enlarged lymph node     Current Outpatient Medications   Medication Sig Dispense Refill     albuterol (PROAIR HFA/PROVENTIL HFA/VENTOLIN HFA) 108 (90 Base) MCG/ACT inhaler Inhale 2 puffs into the lungs every 4 hours as needed for shortness of breath / dyspnea or wheezing 1 Inhaler 3     cetirizine (ZYRTEC) 10 MG CHEW Take 10 mg by mouth daily       fluticasone (FLONASE) 50 MCG/ACT nasal spray Spray 1 spray into both nostrils daily       gabapentin (NEURONTIN) 300 MG capsule TAKE TWO CAPSULES BY MOUTH DAILY AT BEDTIME  180 capsule 0     gabapentin (NEURONTIN) 600 MG tablet Take 600 mg by mouth daily       meclizine (ANTIVERT) 25 MG tablet Take 1 tablet (25 mg) by mouth 3 times daily as needed for dizziness 24 tablet 1     multivitamin w/minerals (THERA-VIT-M) tablet Take 1 tablet by mouth daily       traZODone (DESYREL) 50 MG tablet Take 1-2 tablets ( mg) by mouth At Bedtime 90 tablet 3       Allergies   Allergen Reactions     Ampicillin GI Disturbance        Social History     Tobacco Use     Smoking status: Never Smoker     Smokeless tobacco: Never Used   Substance Use Topics     Alcohol use: Not Currently     Alcohol/week: 0.0 standard drinks     Comment: 6/week        History   Drug Use No            Objective   /70 (BP Location: Left arm, Patient Position: Chair, Cuff Size: Adult Regular)   Pulse 58   Temp 97.1  F (36.2  C) (Temporal)   Resp 16   Wt 69.9 kg (154 lb)   LMP 05/21/2002   SpO2 98%   BMI 27.28 kg/m    Physical Exam  GENERAL APPEARANCE: healthy, alert and no distress  HENT: ear canals and TM's normal and nose and mouth without ulcers or lesions  RESP: lungs clear to auscultation - no rales, rhonchi or wheezes  CV: regular rate and rhythm, normal S1 S2, no S3 or S4 and no murmur, click or rub   ABDOMEN: soft, nontender, no HSM or masses and bowel sounds normal  MS: extremities normal- no gross deformities noted  SKIN: no suspicious lesions or rashes  NEURO: Normal strength and tone, sensory exam grossly normal, mentation intact and speech normal  PSYCH: mentation appears normal and affect normal/bright    Recent Labs   Lab Test 09/12/19  1040 08/26/19  1309 08/18/19 07/21/19  0701 07/20/19  0839 07/20/19  0645   HGB 10.7* 9.5*  --   --   --   --     607*  --   --   --   --    INR  --   --   --   --  0.96  --    NA  --   --   --  142  --  143   POTASSIUM  --   --  3.8 3.7  --  3.7   CR  --   --  0.54* 0.68  --  0.67        PRE-OP Diagnostics:  Recent Results (from the past 24 hour(s))   Hemoglobin    Collection Time: 08/25/20  8:43 AM   Result Value Ref Range    Hemoglobin 12.4 11.7 - 15.7 g/dL   Creatinine    Collection Time: 08/25/20  8:43 AM   Result Value Ref Range    Creatinine 0.63 0.52 - 1.04 mg/dL    GFR Estimate >90 >60 mL/min/[1.73_m2]    GFR Estimate If Black >90 >60 mL/min/[1.73_m2]   Platelet count    Collection Time: 08/25/20  8:43 AM   Result Value Ref Range    Platelet Count 216 150 - 450 10e9/L     No EKG required for low risk surgery (cataract, skin procedure, breast biopsy, etc).         Assessment & Plan   The proposed surgical procedure is considered LOW risk.    REVISED CARDIAC RISK INDEX  The patient has the following  serious cardiovascular risks for perioperative complications:  No serious cardiac risks = 0 points    INTERPRETATION: 0 points: Class I (very low risk - 0.4% complication rate)       Mckayla was seen today for pre-op exam.    Diagnoses and all orders for this visit:    Preop general physical exam  -     Hemoglobin  -     Creatinine  -     Platelet count    Ampullary carcinoma (H)  -     Hemoglobin  -     Creatinine  -     Platelet count        The patient has the following additional risks and recommendations for perioperative complications:     - No identified additional risk factors other than previously addressed     MEDICATION INSTRUCTIONS:  Patient is to take all scheduled medications on the day of surgery    RECOMMENDATION:  APPROVAL GIVEN to proceed with proposed procedure, without further diagnostic evaluation.    No follow-ups on file.    Signed Electronically by: Azalea Rivera PA-C    Copy of this evaluation report is provided to requesting physician.    Preop [facility] Preop Guidelines    Revised Cardiac Risk Index

## 2020-08-28 ENCOUNTER — HOSPITAL ENCOUNTER (OUTPATIENT)
Facility: CLINIC | Age: 65
Discharge: HOME | End: 2020-08-28
Attending: THORACIC SURGERY (CARDIOTHORACIC VASCULAR SURGERY) | Admitting: THORACIC SURGERY (CARDIOTHORACIC VASCULAR SURGERY)
Payer: COMMERCIAL

## 2020-08-28 ENCOUNTER — ANESTHESIA (OUTPATIENT)
Dept: SURGERY | Facility: CLINIC | Age: 65
End: 2020-08-28
Payer: COMMERCIAL

## 2020-08-28 ENCOUNTER — ANESTHESIA EVENT (OUTPATIENT)
Dept: SURGERY | Facility: CLINIC | Age: 65
End: 2020-08-28
Payer: COMMERCIAL

## 2020-08-28 VITALS
WEIGHT: 153 LBS | OXYGEN SATURATION: 98 % | TEMPERATURE: 96.6 F | BODY MASS INDEX: 27.11 KG/M2 | HEIGHT: 63 IN | DIASTOLIC BLOOD PRESSURE: 67 MMHG | SYSTOLIC BLOOD PRESSURE: 138 MMHG | HEART RATE: 53 BPM | RESPIRATION RATE: 12 BRPM

## 2020-08-28 DIAGNOSIS — G89.18 ACUTE POST-OPERATIVE PAIN: Primary | ICD-10-CM

## 2020-08-28 PROCEDURE — 71000012 ZZH RECOVERY PHASE 1 LEVEL 1 FIRST HR: Performed by: THORACIC SURGERY (CARDIOTHORACIC VASCULAR SURGERY)

## 2020-08-28 PROCEDURE — 25000128 H RX IP 250 OP 636: Performed by: NURSE ANESTHETIST, CERTIFIED REGISTERED

## 2020-08-28 PROCEDURE — 25800030 ZZH RX IP 258 OP 636: Performed by: NURSE ANESTHETIST, CERTIFIED REGISTERED

## 2020-08-28 PROCEDURE — 25000125 ZZHC RX 250: Performed by: THORACIC SURGERY (CARDIOTHORACIC VASCULAR SURGERY)

## 2020-08-28 PROCEDURE — 40000170 ZZH STATISTIC PRE-PROCEDURE ASSESSMENT II: Performed by: THORACIC SURGERY (CARDIOTHORACIC VASCULAR SURGERY)

## 2020-08-28 PROCEDURE — 36000050 ZZH SURGERY LEVEL 2 1ST 30 MIN: Performed by: THORACIC SURGERY (CARDIOTHORACIC VASCULAR SURGERY)

## 2020-08-28 PROCEDURE — 27210794 ZZH OR GENERAL SUPPLY STERILE: Performed by: THORACIC SURGERY (CARDIOTHORACIC VASCULAR SURGERY)

## 2020-08-28 PROCEDURE — 71000027 ZZH RECOVERY PHASE 2 EACH 15 MINS: Performed by: THORACIC SURGERY (CARDIOTHORACIC VASCULAR SURGERY)

## 2020-08-28 PROCEDURE — 37000009 ZZH ANESTHESIA TECHNICAL FEE, EACH ADDTL 15 MIN: Performed by: THORACIC SURGERY (CARDIOTHORACIC VASCULAR SURGERY)

## 2020-08-28 PROCEDURE — 25000125 ZZHC RX 250: Performed by: NURSE ANESTHETIST, CERTIFIED REGISTERED

## 2020-08-28 PROCEDURE — 37000008 ZZH ANESTHESIA TECHNICAL FEE, 1ST 30 MIN: Performed by: THORACIC SURGERY (CARDIOTHORACIC VASCULAR SURGERY)

## 2020-08-28 RX ORDER — HYDROMORPHONE HYDROCHLORIDE 1 MG/ML
.3-.5 INJECTION, SOLUTION INTRAMUSCULAR; INTRAVENOUS; SUBCUTANEOUS EVERY 10 MIN PRN
Status: DISCONTINUED | OUTPATIENT
Start: 2020-08-28 | End: 2020-08-28 | Stop reason: HOSPADM

## 2020-08-28 RX ORDER — HYDROCODONE BITARTRATE AND ACETAMINOPHEN 5; 325 MG/1; MG/1
1 TABLET ORAL EVERY 6 HOURS PRN
Qty: 3 TABLET | Refills: 0 | Status: SHIPPED | OUTPATIENT
Start: 2020-08-28

## 2020-08-28 RX ORDER — ACETAMINOPHEN 325 MG/1
650 TABLET ORAL
Status: DISCONTINUED | OUTPATIENT
Start: 2020-08-28 | End: 2020-08-28 | Stop reason: HOSPADM

## 2020-08-28 RX ORDER — ONDANSETRON 2 MG/ML
INJECTION INTRAMUSCULAR; INTRAVENOUS PRN
Status: DISCONTINUED | OUTPATIENT
Start: 2020-08-28 | End: 2020-08-28

## 2020-08-28 RX ORDER — PROPOFOL 10 MG/ML
INJECTION, EMULSION INTRAVENOUS CONTINUOUS PRN
Status: DISCONTINUED | OUTPATIENT
Start: 2020-08-28 | End: 2020-08-28

## 2020-08-28 RX ORDER — NALOXONE HYDROCHLORIDE 0.4 MG/ML
.1-.4 INJECTION, SOLUTION INTRAMUSCULAR; INTRAVENOUS; SUBCUTANEOUS
Status: DISCONTINUED | OUTPATIENT
Start: 2020-08-28 | End: 2020-08-28 | Stop reason: HOSPADM

## 2020-08-28 RX ORDER — ONDANSETRON 4 MG/1
4 TABLET, ORALLY DISINTEGRATING ORAL EVERY 30 MIN PRN
Status: DISCONTINUED | OUTPATIENT
Start: 2020-08-28 | End: 2020-08-28 | Stop reason: HOSPADM

## 2020-08-28 RX ORDER — MEPERIDINE HYDROCHLORIDE 25 MG/ML
12.5 INJECTION INTRAMUSCULAR; INTRAVENOUS; SUBCUTANEOUS
Status: DISCONTINUED | OUTPATIENT
Start: 2020-08-28 | End: 2020-08-28 | Stop reason: HOSPADM

## 2020-08-28 RX ORDER — SODIUM CHLORIDE, SODIUM LACTATE, POTASSIUM CHLORIDE, CALCIUM CHLORIDE 600; 310; 30; 20 MG/100ML; MG/100ML; MG/100ML; MG/100ML
INJECTION, SOLUTION INTRAVENOUS CONTINUOUS
Status: DISCONTINUED | OUTPATIENT
Start: 2020-08-28 | End: 2020-08-28 | Stop reason: HOSPADM

## 2020-08-28 RX ORDER — FENTANYL CITRATE 50 UG/ML
25-50 INJECTION, SOLUTION INTRAMUSCULAR; INTRAVENOUS
Status: DISCONTINUED | OUTPATIENT
Start: 2020-08-28 | End: 2020-08-28 | Stop reason: HOSPADM

## 2020-08-28 RX ORDER — LIDOCAINE HYDROCHLORIDE 20 MG/ML
INJECTION, SOLUTION INFILTRATION; PERINEURAL PRN
Status: DISCONTINUED | OUTPATIENT
Start: 2020-08-28 | End: 2020-08-28

## 2020-08-28 RX ORDER — HYDROCODONE BITARTRATE AND ACETAMINOPHEN 5; 325 MG/1; MG/1
1 TABLET ORAL
Status: DISCONTINUED | OUTPATIENT
Start: 2020-08-28 | End: 2020-08-28 | Stop reason: HOSPADM

## 2020-08-28 RX ORDER — SODIUM CHLORIDE, SODIUM LACTATE, POTASSIUM CHLORIDE, CALCIUM CHLORIDE 600; 310; 30; 20 MG/100ML; MG/100ML; MG/100ML; MG/100ML
INJECTION, SOLUTION INTRAVENOUS CONTINUOUS PRN
Status: DISCONTINUED | OUTPATIENT
Start: 2020-08-28 | End: 2020-08-28

## 2020-08-28 RX ORDER — FENTANYL CITRATE 50 UG/ML
25-50 INJECTION, SOLUTION INTRAMUSCULAR; INTRAVENOUS EVERY 5 MIN PRN
Status: DISCONTINUED | OUTPATIENT
Start: 2020-08-28 | End: 2020-08-28 | Stop reason: HOSPADM

## 2020-08-28 RX ORDER — ONDANSETRON 2 MG/ML
4 INJECTION INTRAMUSCULAR; INTRAVENOUS EVERY 30 MIN PRN
Status: DISCONTINUED | OUTPATIENT
Start: 2020-08-28 | End: 2020-08-28 | Stop reason: HOSPADM

## 2020-08-28 RX ORDER — FENTANYL CITRATE 50 UG/ML
INJECTION, SOLUTION INTRAMUSCULAR; INTRAVENOUS PRN
Status: DISCONTINUED | OUTPATIENT
Start: 2020-08-28 | End: 2020-08-28

## 2020-08-28 RX ADMIN — PROPOFOL 150 MCG/KG/MIN: 10 INJECTION, EMULSION INTRAVENOUS at 12:30

## 2020-08-28 RX ADMIN — ONDANSETRON 4 MG: 2 INJECTION INTRAMUSCULAR; INTRAVENOUS at 12:37

## 2020-08-28 RX ADMIN — SODIUM CHLORIDE, POTASSIUM CHLORIDE, SODIUM LACTATE AND CALCIUM CHLORIDE: 600; 310; 30; 20 INJECTION, SOLUTION INTRAVENOUS at 12:25

## 2020-08-28 RX ADMIN — FENTANYL CITRATE 25 MCG: 50 INJECTION, SOLUTION INTRAMUSCULAR; INTRAVENOUS at 12:30

## 2020-08-28 RX ADMIN — FENTANYL CITRATE 25 MCG: 50 INJECTION, SOLUTION INTRAMUSCULAR; INTRAVENOUS at 12:45

## 2020-08-28 RX ADMIN — LIDOCAINE HYDROCHLORIDE 40 MG: 20 INJECTION, SOLUTION INFILTRATION; PERINEURAL at 12:30

## 2020-08-28 RX ADMIN — MIDAZOLAM 2 MG: 1 INJECTION INTRAMUSCULAR; INTRAVENOUS at 12:25

## 2020-08-28 ASSESSMENT — MIFFLIN-ST. JEOR: SCORE: 1198.13

## 2020-08-28 NOTE — OP NOTE
DATE OF PROCEDURE: 08-      SURGEON:  Papa May MD       FIRST ASSISTANT:  Karlie Shaw PA-C       PREOPERATIVE DIAGNOSIS:  carcinoma of the Ampulla of Vater      POSTOPERATIVE DIAGNOSIS:  Same       PROCEDURE:  Removal of PowerPort implanted port, left subclavian vein.       ANESTHESIA:  Local with lidocaine 1% without epinephrine and sedation.       INDICATIONS:  Patient had a port placed for treatment.  Therapy has now been completed and a PowerPort was no longer necessary.       DESCRIPTION OF PROCEDURE:  The patient was brought to the OR and placed in supine position.  IV sedation was given.  The left infraclavicular area was prepared and draped in the usual fashion using ChloraPrep.  Local anesthesia was done with lidocaine 1% without epinephrine.  The infraclavicular incision was opened.  The port was dissected from the subcutaneous pocket and port and the catheter were removed.  The catheter was intact.  The tract of the catheter was closed with a figure-of-eight of 3-0 Vicryl.  Hemostasis was excellent.  Incision was closed in the usual fashion.  Estimated blood loss minimal.  Sponge and needle counts correct.           PAPA MAY MD

## 2020-08-28 NOTE — DISCHARGE INSTRUCTIONS
Discharge Instructions for Port Removal        You may remove your bandage and shower in 24 hours.     You may resume normal activity as tolerated.      You may apply ice to the area for comfort.    Your incision was closed using a liquid adhesive.  Do not scratch, rub or pick at the film over your incision.  You may shower in 24 hours.  Do not soak in a tub for at least one week.  Do not apply lotions or cream to you incision.     Watch incision for signs of infection:  Redness  Swelling  Drainage  Temperature    Call your surgeon for questions and concerns.       **If you have questions or concerns about your procedure,  call Dr. May at [phone]**        Same Day Surgery Discharge Instructions for  Sedation and General Anesthesia       It's not unusual to feel dizzy, light-headed or faint for up to 24 hours after surgery or while taking pain medication.  If you have these symptoms: sit for a few minutes before standing and have someone assist you when you get up to walk or use the bathroom.      You should rest and relax for the next 24 hours. We recommend you make arrangements to have an adult stay with you for at least 24 hours after your discharge.  Avoid hazardous and strenuous activity.      DO NOT DRIVE any vehicle or operate mechanical equipment for 24 hours following the end of your surgery.  Even though you may feel normal, your reactions may be affected by the medication you have received.      Do not drink alcoholic beverages for 24 hours following surgery.       Slowly progress to your regular diet as you feel able. It's not unusual to feel nauseated and/or vomit after receiving anesthesia.  If you develop these symptoms, drink clear liquids (apple juice, ginger ale, broth, 7-up, etc. ) until you feel better.  If your nausea and vomiting persists for 24 hours, please notify your surgeon.        All narcotic pain medications, along with inactivity and anesthesia, can cause constipation.  Drinking plenty of liquids and increasing fiber intake will help.      For any questions of a medical nature, call your surgeon.      Do not make important decisions for 24 hours.      If you had general anesthesia, you may have a sore throat for a couple of days related to the breathing tube used during surgery.  You may use Cepacol lozenges to help with this discomfort.  If it worsens or if you develop a fever, contact your surgeon.       If you feel your pain is not well managed with the pain medications prescribed by your surgeon, please contact your surgeon's office to let them know so they can address your concerns.       CoVid 19 Information    We want to give you information regarding Covid. Please consult your primary care provider with any questions you might have.     Patient who have symptoms (cough, fever, or shortness of breath), need to isolate for 7 days from when symptoms started OR 72 hours after fever resolves (without fever reducing medications) AND improvement of respiratory symptoms (whichever is longer).      Isolate yourself at home (in own room/own bathroom if possible)    Do Not allow any visitors    Do Not go to work or school    Do Not go to [employer],  centers, shopping, or other public places.    Do Not shake hands.    Avoid close and intimate contact with others (hugging, kissing).    Follow CDC recommendations for household cleaning of frequently touched services.     After the initial 7 days, continue to isolate yourself from household members as much as possible. To continue decrease the risk of community spread and exposure, you and any members of your household should limit activities in public for 14 days after starting home isolation.     You can reference the following CDC link for helpful home isolation/care tips:  https://www.cdc.gov/coronavirus/2019-ncov/downloads/10Things.pdf    Protect Others:    Cover Your Mouth and Nose with a mask, disposable tissue or wash cloth  to avoid spreading germs to others.    Wash your hands and face frequently with soap and water    Call Your Primary Doctor If: Breathing difficulty develops or you become worse.    For more information about COVID19 and options for caring for yourself at home, please visit the CDC website at https://www.cdc.gov/coronavirus/2019-ncov/about/steps-when-sick.html  For more options for care at [facility], please visit our website at [email or web address]

## 2020-08-28 NOTE — PROGRESS NOTES
PreOp cares/orders now completed. Pt ready for planned procedure. No concerns voiced.  Surgeon has rounded on pt; no new orders given.

## 2020-08-28 NOTE — ANESTHESIA POSTPROCEDURE EVALUATION
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    Anesthesia Type:  MAC    Note:  Anesthesia Post Evaluation    Patient location during evaluation: PACU  Patient participation: Able to fully participate in evaluation  Level of consciousness: awake  Pain management: adequate  Airway patency: patent  Cardiovascular status: hemodynamically stable  Respiratory status: acceptable and unassisted  Hydration status: acceptable  PONV: none             Last vitals:  Vitals:    08/28/20 1300 08/28/20 1315 08/28/20 1347   BP: 112/58 121/69 138/67   Pulse: 61 54 53   Resp: 14 11 12   Temp:   35.9  C (96.6  F)   SpO2: 96% 95% 98%         Electronically Signed By: Boris Tian MD  August 28, 2020  2:13 PM

## 2020-08-28 NOTE — ANESTHESIA CARE TRANSFER NOTE
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    Anesthesia Type:   MAC     Note:  Airway :Nasal Cannula  Patient transferred to:PACU  Comments: At end of procedure, spontaneous respirations, patient alert to voice, able to follow commands. Oxygen via nasal cannula at 2 liters per minute to PACU. Oxygen tubing connected to wall O2 in PACU, SpO2, NiBP, and EKG monitors and alarms on and functioning, Kimberly Hugger warmer connected to patient gown, report on patient's clinical status given to PACU RN, RN questions answered.Handoff Report: Identifed the Patient, Identified the Reponsible Provider, Reviewed the pertinent medical history, Discussed the surgical course, Reviewed Intra-OP anesthesia mangement and issues during anesthesia, Set expectations for post-procedure period and Allowed opportunity for questions and acknowledgement of understanding      Vitals: (Last set prior to Anesthesia Care Transfer)    CRNA VITALS  8/28/2020 1219 - 8/28/2020 1255      8/28/2020             NIBP:  114/57    NIBP Mean:  75    Resp Rate (set):  10                Electronically Signed By: CLAIR Rosen CRNA  August 28, 2020  12:55 PM

## 2020-08-28 NOTE — ANESTHESIA PREPROCEDURE EVALUATION
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]    Procedure(s):  PORT REMOVAL    Past Medical History:   Diagnosis Date     Esophageal reflux      Hallux rigidus     bilatateral     Insomnia, unspecified      Numbness and tingling     localized to LLE; > TOP OF FOOT & CALF AREAS     PONV (postoperative nausea and vomiting)      Primary adenocarcinoma of ampulla of Vater (H)      Seasonal allergies      Past Surgical History:   Procedure Laterality Date     ABDOMEN SURGERY      csection X3     ABDOMEN SURGERY  2019    whipple resection     APPENDECTOMY       C EXPLORE/TREAT ANKLE JOINT       C TOTAL ABDOM HYSTERECTOMY      with bilat ovary removal     CHOLECYSTECTOMY  2019    included in whipple surgery     COLONOSCOPY  2009     COLONOSCOPY  2012    Repeat in 5 years     DISCECTOMY LUMBAR POSTERIOR MICROSCOPIC ONE LEVEL Right 2015    Procedure: DISCECTOMY LUMBAR POSTERIOR MICROSCOPIC ONE LEVEL;  Surgeon: Román Smith MD;  Location:  OR     DISKECTOMY, LUMBAR, SINGLE SP  2001    X 2     DISKECTOMY, LUMBAR, SINGLE SP        ENDOSCOPIC RETROGRADE CHOLANGIOPANCREATOGRAM N/A 2019    Procedure: ENDOSCOPIC RETROGRADE CHOLANGIOPANCREATPGRAPHY, SPINCTEROTOMY, BALLOON SWEEP, AMPILLARY BIOPSY, STENT PLACEMENT, SPHINCTEROTOMY;  Surgeon: Hansel Tatum MD;  Location:  OR     ENDOSCOPIC RETROGRADE CHOLANGIOPANCREATOGRAM N/A 2019    Procedure: ENDOSCOPIC RETROGRADE CHOLANGIOPANCREATOGRAPHY, COMMON BILE DUCT BRUSHINGS AND 10f/7CM STENT PLACEMENT X 2 STENTS, BIOPSIES OF AMPULA  MASS AND;  Surgeon: Michelle Leahy MD;  Location:  OR     ENDOSCOPIC ULTRASOUND UPPER GASTROINTESTINAL TRACT (GI) N/A 2019    Procedure: ENDOSCOPIC ULTRASOUND, ESOPHAGOSCOPY / UPPER GASTROINTESTINAL TRACT, STENT REMOVAL, AND FNA / BIOPIES OF AMPULA  MASS;   Surgeon: Michelle Leahy MD;  Location:  OR     HC REVISE MEDIAN N/CARPAL TUNNEL SURG  2002     HYSTERECTOMY, PAP NO LONGER INDICATED       INSERT PORT VASCULAR ACCESS N/A 10/14/2019    Procedure: PORT PLACEMENT, Left Subclavian;  Surgeon: Papa May MD;  Location:  OR     OPTICAL TRACKING SYSTEM FUSION SPINE POSTERIOR LUMBAR TWO LEVELS N/A 8/4/2016    Procedure: OPTICAL TRACKING SYSTEM FUSION SPINE POSTERIOR LUMBAR TWO LEVELS;  Surgeon: Román Smith MD;  Location:  OR     ORTHOPEDIC SURGERY  9/2015    GT TOE JOINT FUSION - BILATERAL     SURGICAL HISTORY OF -   1976    laparoscopy benign enlarged lymph node       Anesthesia Evaluation     . Pt has had prior anesthetic.      Denies anesthesia issues. Chart review indicates PONV.      ROS/MED HX    ENT/Pulmonary:  - neg pulmonary ROS     Neurologic:  - neg neurologic ROS     Cardiovascular:  - neg cardiovascular ROS       METS/Exercise Tolerance:     Hematologic:  - neg hematologic  ROS       Musculoskeletal:  - neg musculoskeletal ROS       GI/Hepatic:     (+) GERD       Renal/Genitourinary: Comment: H/o kidney stones.    (+) chronic renal disease,       Endo:  - neg endo ROS       Psychiatric:  - neg psychiatric ROS       Infectious Disease:  - neg infectious disease ROS       Malignancy:   (+)   H/o ampullary carcinoma.        Other:                          Physical Exam  Normal systems: cardiovascular and pulmonary    Airway   Mallampati: II    Dental     Cardiovascular       Pulmonary             Lab Results   Component Value Date    WBC 12.4 (H) 09/12/2019    HGB 12.4 08/25/2020    HCT 32.2 (L) 09/12/2019     08/25/2020    SED 16 07/10/2019     07/21/2019    POTASSIUM 3.8 08/18/2019    CHLORIDE 107 07/21/2019    CO2 28 07/21/2019    BUN 9 07/21/2019    CR 0.63 08/25/2020     (A) 08/18/2019    CARMEN 8.8 07/21/2019    ALBUMIN 3.5 08/26/2019    PROTTOTAL 6.9 08/26/2019    ALT 47 08/26/2019    AST 43  "08/26/2019    ALKPHOS 225 (H) 08/26/2019    BILITOTAL 1.3 08/26/2019    LIPASE 193 07/21/2019    INR 0.96 07/20/2019    TSH 1.85 07/10/2019    T4 1.09 07/12/2016       Preop Vitals  BP Readings from Last 3 Encounters:   08/28/20 128/67   08/25/20 126/70   08/12/20 118/62    Pulse Readings from Last 3 Encounters:   08/25/20 58   08/12/20 58   10/14/19 56      Resp Readings from Last 3 Encounters:   08/28/20 16   08/25/20 16   10/14/19 16    SpO2 Readings from Last 3 Encounters:   08/28/20 99%   08/25/20 98%   08/12/20 98%      Temp Readings from Last 1 Encounters:   08/28/20 36.6  C (97.8  F) (Temporal)    Ht Readings from Last 1 Encounters:   08/28/20 1.6 m (5' 3\")      Wt Readings from Last 1 Encounters:   08/28/20 69.4 kg (153 lb)    Estimated body mass index is 27.1 kg/m  as calculated from the following:    Height as of this encounter: 1.6 m (5' 3\").    Weight as of this encounter: 69.4 kg (153 lb).       Anesthesia Plan      History & Physical Review  History and physical reviewed and following examination; no interval change.    ASA Status:  2 .        Plan for MAC with Intravenous induction. Reason for MAC:  Deep or markedly invasive procedure (G8)  PONV prophylaxis:  Ondansetron (or other 5HT-3)         Postoperative Care  Postoperative pain management:  IV analgesics and Multi-modal analgesia.      Consents  Anesthetic plan, risks, benefits and alternatives discussed with:  Patient..                 Boris Tian MD  "

## 2020-09-03 ENCOUNTER — HOSPITAL ENCOUNTER (OUTPATIENT)
Dept: CT IMAGING | Facility: CLINIC | Age: 65
Discharge: HOME | End: 2020-09-03
Attending: NURSE PRACTITIONER | Admitting: NURSE PRACTITIONER
Payer: COMMERCIAL

## 2020-09-03 DIAGNOSIS — C24.1 CARCINOMA OF AMPULLA OF VATER (H): ICD-10-CM

## 2020-09-03 PROCEDURE — 25000128 H RX IP 250 OP 636: Performed by: NURSE PRACTITIONER

## 2020-09-03 PROCEDURE — 25000125 ZZHC RX 250: Performed by: NURSE PRACTITIONER

## 2020-09-03 PROCEDURE — 71260 CT THORAX DX C+: CPT

## 2020-09-03 RX ORDER — IOPAMIDOL 755 MG/ML
500 INJECTION, SOLUTION INTRAVASCULAR ONCE
Status: COMPLETED | OUTPATIENT
Start: 2020-09-03 | End: 2020-09-03

## 2020-09-03 RX ADMIN — SODIUM CHLORIDE 59 ML: 9 INJECTION, SOLUTION INTRAVENOUS at 08:45

## 2020-09-03 RX ADMIN — IOPAMIDOL 75 ML: 755 INJECTION, SOLUTION INTRAVENOUS at 08:45

## 2020-10-28 DIAGNOSIS — M51.369 DDD (DEGENERATIVE DISC DISEASE), LUMBAR: ICD-10-CM

## 2020-10-28 DIAGNOSIS — M48.00 CENTRAL SPINAL STENOSIS: ICD-10-CM

## 2020-10-28 RX ORDER — GABAPENTIN 300 MG/1
CAPSULE ORAL
Qty: 180 CAPSULE | Refills: 0 | Status: SHIPPED | OUTPATIENT
Start: 2020-10-28 | End: 2021-01-29

## 2020-10-28 NOTE — TELEPHONE ENCOUNTER
Routing refill request to provider for review/approval because:  Drug not on the [facility] refill protocol     Cecy HOLLOWAY RN  EP Triage

## 2020-11-19 DIAGNOSIS — F51.04 PSYCHOPHYSIOLOGICAL INSOMNIA: ICD-10-CM

## 2020-11-20 RX ORDER — TRAZODONE HYDROCHLORIDE 50 MG/1
50-100 TABLET, FILM COATED ORAL AT BEDTIME
Qty: 90 TABLET | Refills: 2 | Status: SHIPPED | OUTPATIENT
Start: 2020-11-20

## 2020-11-20 NOTE — TELEPHONE ENCOUNTER
Prescription approved per [facility] Refill Protocol.  Brittanie Jiang RN

## 2021-01-15 ENCOUNTER — HEALTH MAINTENANCE LETTER (OUTPATIENT)
Age: 66
End: 2021-01-15

## 2021-01-27 DIAGNOSIS — M51.369 DDD (DEGENERATIVE DISC DISEASE), LUMBAR: ICD-10-CM

## 2021-01-27 DIAGNOSIS — M48.00 CENTRAL SPINAL STENOSIS: ICD-10-CM

## 2021-01-28 NOTE — TELEPHONE ENCOUNTER
I'm not sure who Mckayla is following for primary care. Looks like she has seen Gael Jacobson a few times. Please call her and check and then route to appropriate provider.

## 2021-01-29 RX ORDER — GABAPENTIN 300 MG/1
CAPSULE ORAL
Qty: 180 CAPSULE | Refills: 0 | Status: SHIPPED | OUTPATIENT
Start: 2021-01-29

## 2021-01-29 NOTE — TELEPHONE ENCOUNTER
S/w pt who states she would like Dr. Vargas listed as her PCP.    rx refill sent to her.    Cecy HOLLOWAY RN  EP Triage

## 2021-01-29 NOTE — TELEPHONE ENCOUNTER
I did order refill 1x 90 days, but I haven't seen Mckayla in awhile.  She is due for her Medicare Wellness Visit. This could be video or in-person.      Jennifer Vargas MD

## 2021-02-05 NOTE — TELEPHONE ENCOUNTER
2nd Notice    Called and left a voicemail to call the clinic.    Nely Thomas on 2/5/2021 at 8:56 AM

## 2021-03-09 ENCOUNTER — HOSPITAL ENCOUNTER (OUTPATIENT)
Dept: CT IMAGING | Facility: CLINIC | Age: 66
Discharge: HOME | End: 2021-03-09
Attending: INTERNAL MEDICINE | Admitting: INTERNAL MEDICINE
Payer: COMMERCIAL

## 2021-03-09 DIAGNOSIS — C24.1 CANCER OF AMPULLA OF VATER (H): ICD-10-CM

## 2021-03-09 PROCEDURE — 250N000011 HC RX IP 250 OP 636: Performed by: INTERNAL MEDICINE

## 2021-03-09 PROCEDURE — 250N000009 HC RX 250: Performed by: INTERNAL MEDICINE

## 2021-03-09 PROCEDURE — 71260 CT THORAX DX C+: CPT

## 2021-03-09 RX ORDER — IOPAMIDOL 755 MG/ML
500 INJECTION, SOLUTION INTRAVASCULAR ONCE
Status: COMPLETED | OUTPATIENT
Start: 2021-03-09 | End: 2021-03-09

## 2021-03-09 RX ADMIN — SODIUM CHLORIDE 59 ML: 9 INJECTION, SOLUTION INTRAVENOUS at 08:09

## 2021-03-09 RX ADMIN — IOPAMIDOL 75 ML: 755 INJECTION, SOLUTION INTRAVENOUS at 08:09

## 2021-03-10 ENCOUNTER — TRANSFERRED RECORDS (OUTPATIENT)
Dept: HEALTH INFORMATION MANAGEMENT | Facility: CLINIC | Age: 66
End: 2021-03-10

## 2021-03-25 NOTE — TELEPHONE ENCOUNTER
Patient is calling because she still does not feel better symptom wise still has a fever, but low grade 100.0 degrees fahrenheit and intermittent cough. Patient just started antibiotic (Macrobid) yesterday, has been 24 hours, patient wanted to know how long antibiotic should start working, advised about 1-3 days.   Per NP note CXR was unremarkable, informed patient about this. Advised that urine culture will result and can be addressed on Monday with patient. Per NP advised for patient to rest, hydrate, and continue taking antibiotic. Patient had felt nauseous and advised that she eat food with antibiotic. Patient/parent verbalized understanding and agrees with plan.     Informed patient that if her symptoms worsen over the weekend to go to urgent care otherwise follow up by calling the clinic on Monday with her symptoms. Patient/parent verbalized understanding and agrees with plan.     Kellie Whitten RN, BSN  [facility]        
numerical 0-10

## 2021-04-02 ENCOUNTER — TELEPHONE (OUTPATIENT)
Dept: FAMILY MEDICINE | Facility: CLINIC | Age: 66
End: 2021-04-02

## 2021-04-02 NOTE — TELEPHONE ENCOUNTER
Patient Quality Outreach      Summary:    Patient has the following on her problem list/HM: None    Patient is due/failing the following:   Annual wellness, date due: 6/18/2018    Type of outreach:    Sent letter.    Questions for provider review:    None                                                                                                                                     Linda Jesus CMA       Chart routed to .

## 2021-04-02 NOTE — LETTER
April 2, 2021      Mckayla Grady  [address] [place] [address]        Dear Mckayla,    I care about your health and have reviewed your health plan. I have reviewed your medical conditions, medication list, and lab results and am making recommendations based on this review, to better manage your health.    You are in particular need of attention regarding:  -Wellness (Physical) Visit     I am recommending that you:  -schedule a WELLNESS (Physical) APPOINTMENT with me.   I will check fasting labs the same day - nothing to eat except water and meds for 8-10 hours prior.    Here is a list of Health Maintenance topics that are due now or due soon:  Health Maintenance Due   Topic Date Due     Annual Wellness Visit  06/18/2018     Anxiety Assessment  08/08/2020     Flu Vaccine (1) 09/01/2020     PHQ-2  01/01/2021     COVID-19 Vaccine (2 - Pfizer 2-dose series) 01/18/2021     Osteoporosis Screening  02/08/2021       Please call us at [phone] (or use [employer]) to address the above recommendations.     Thank you for trusting [facility] and we appreciate the opportunity to serve you.  We look forward to supporting your healthcare needs in the future.    Healthy Regards,    Gael Jacobson, APRN, CNP

## 2021-05-12 ENCOUNTER — TRANSFERRED RECORDS (OUTPATIENT)
Dept: HEALTH INFORMATION MANAGEMENT | Facility: CLINIC | Age: 66
End: 2021-05-12

## 2021-08-12 ENCOUNTER — HOSPITAL ENCOUNTER (OUTPATIENT)
Dept: MAMMOGRAPHY | Facility: CLINIC | Age: 66
Discharge: HOME | End: 2021-08-12
Attending: FAMILY MEDICINE | Admitting: FAMILY MEDICINE
Payer: COMMERCIAL

## 2021-08-12 DIAGNOSIS — Z12.31 VISIT FOR SCREENING MAMMOGRAM: ICD-10-CM

## 2021-08-12 PROCEDURE — 77063 BREAST TOMOSYNTHESIS BI: CPT

## 2021-08-31 ENCOUNTER — HOSPITAL ENCOUNTER (OUTPATIENT)
Dept: CT IMAGING | Facility: CLINIC | Age: 66
Discharge: HOME | End: 2021-08-31
Attending: INTERNAL MEDICINE | Admitting: INTERNAL MEDICINE
Payer: COMMERCIAL

## 2021-08-31 DIAGNOSIS — C24.1 CANCER OF AMPULLA OF VATER (H): ICD-10-CM

## 2021-08-31 PROCEDURE — 250N000009 HC RX 250: Performed by: INTERNAL MEDICINE

## 2021-08-31 PROCEDURE — 250N000011 HC RX IP 250 OP 636: Performed by: INTERNAL MEDICINE

## 2021-08-31 PROCEDURE — 74177 CT ABD & PELVIS W/CONTRAST: CPT

## 2021-08-31 RX ORDER — IOPAMIDOL 755 MG/ML
500 INJECTION, SOLUTION INTRAVASCULAR ONCE
Status: COMPLETED | OUTPATIENT
Start: 2021-08-31 | End: 2021-08-31

## 2021-08-31 RX ADMIN — SODIUM CHLORIDE 59 ML: 9 INJECTION, SOLUTION INTRAVENOUS at 07:54

## 2021-08-31 RX ADMIN — IOPAMIDOL 77 ML: 755 INJECTION, SOLUTION INTRAVENOUS at 07:54

## 2021-09-17 ENCOUNTER — TRANSFERRED RECORDS (OUTPATIENT)
Dept: HEALTH INFORMATION MANAGEMENT | Facility: CLINIC | Age: 66
End: 2021-09-17

## 2021-10-31 ENCOUNTER — HEALTH MAINTENANCE LETTER (OUTPATIENT)
Age: 66
End: 2021-10-31

## 2021-12-29 ENCOUNTER — MEDICAL CORRESPONDENCE (OUTPATIENT)
Dept: HEALTH INFORMATION MANAGEMENT | Facility: CLINIC | Age: 66
End: 2021-12-29
Payer: COMMERCIAL

## 2021-12-29 ENCOUNTER — TRANSFERRED RECORDS (OUTPATIENT)
Dept: HEALTH INFORMATION MANAGEMENT | Facility: CLINIC | Age: 66
End: 2021-12-29
Payer: COMMERCIAL

## 2022-02-24 ENCOUNTER — MEDICAL CORRESPONDENCE (OUTPATIENT)
Dept: HEALTH INFORMATION MANAGEMENT | Facility: CLINIC | Age: 67
End: 2022-02-24
Payer: COMMERCIAL

## 2022-02-25 DIAGNOSIS — C24.1 AMPULLARY CARCINOMA (H): Primary | ICD-10-CM

## 2022-03-11 DIAGNOSIS — M81.0 SENILE OSTEOPOROSIS: Primary | ICD-10-CM

## 2022-03-11 RX ORDER — DIPHENHYDRAMINE HYDROCHLORIDE 50 MG/ML
50 INJECTION INTRAMUSCULAR; INTRAVENOUS
Status: CANCELLED
Start: 2022-03-18

## 2022-03-11 RX ORDER — MEPERIDINE HYDROCHLORIDE 25 MG/ML
25 INJECTION INTRAMUSCULAR; INTRAVENOUS; SUBCUTANEOUS EVERY 30 MIN PRN
Status: CANCELLED | OUTPATIENT
Start: 2022-03-18

## 2022-03-11 RX ORDER — ZOLEDRONIC ACID 5 MG/100ML
5 INJECTION, SOLUTION INTRAVENOUS ONCE
Status: CANCELLED
Start: 2022-03-18 | End: 2022-03-18

## 2022-03-11 RX ORDER — EPINEPHRINE 1 MG/ML
0.3 INJECTION, SOLUTION INTRAMUSCULAR; SUBCUTANEOUS EVERY 5 MIN PRN
Status: CANCELLED | OUTPATIENT
Start: 2022-03-18

## 2022-03-11 RX ORDER — ALBUTEROL SULFATE 0.83 MG/ML
2.5 SOLUTION RESPIRATORY (INHALATION)
Status: CANCELLED | OUTPATIENT
Start: 2022-03-18

## 2022-03-11 RX ORDER — HEPARIN SODIUM,PORCINE 10 UNIT/ML
5 VIAL (ML) INTRAVENOUS
Status: CANCELLED | OUTPATIENT
Start: 2022-03-18

## 2022-03-11 RX ORDER — NALOXONE HYDROCHLORIDE 0.4 MG/ML
0.2 INJECTION, SOLUTION INTRAMUSCULAR; INTRAVENOUS; SUBCUTANEOUS
Status: CANCELLED | OUTPATIENT
Start: 2022-03-18

## 2022-03-11 RX ORDER — ALBUTEROL SULFATE 90 UG/1
1-2 AEROSOL, METERED RESPIRATORY (INHALATION)
Status: CANCELLED
Start: 2022-03-18

## 2022-03-11 RX ORDER — HEPARIN SODIUM (PORCINE) LOCK FLUSH IV SOLN 100 UNIT/ML 100 UNIT/ML
5 SOLUTION INTRAVENOUS
Status: CANCELLED | OUTPATIENT
Start: 2022-03-18

## 2022-03-11 RX ORDER — METHYLPREDNISOLONE SODIUM SUCCINATE 125 MG/2ML
125 INJECTION, POWDER, LYOPHILIZED, FOR SOLUTION INTRAMUSCULAR; INTRAVENOUS
Status: CANCELLED
Start: 2022-03-18

## 2022-04-05 ENCOUNTER — LAB (OUTPATIENT)
Dept: LAB | Facility: CLINIC | Age: 67
End: 2022-04-05
Payer: COMMERCIAL

## 2022-04-05 DIAGNOSIS — C24.1 AMPULLARY CARCINOMA (H): Primary | ICD-10-CM

## 2022-04-05 PROCEDURE — U0005 INFEC AGEN DETEC AMPLI PROBE: HCPCS

## 2022-04-05 PROCEDURE — U0003 INFECTIOUS AGENT DETECTION BY NUCLEIC ACID (DNA OR RNA); SEVERE ACUTE RESPIRATORY SYNDROME CORONAVIRUS 2 (SARS-COV-2) (CORONAVIRUS DISEASE [COVID-19]), AMPLIFIED PROBE TECHNIQUE, MAKING USE OF HIGH THROUGHPUT TECHNOLOGIES AS DESCRIBED BY CMS-2020-01-R: HCPCS

## 2022-04-06 LAB — SARS-COV-2 RNA RESP QL NAA+PROBE: NEGATIVE

## 2022-04-08 ENCOUNTER — INFUSION THERAPY VISIT (OUTPATIENT)
Dept: INFUSION THERAPY | Facility: CLINIC | Age: 67
End: 2022-04-08
Attending: INTERNAL MEDICINE
Payer: COMMERCIAL

## 2022-04-08 VITALS
HEIGHT: 63 IN | WEIGHT: 161.1 LBS | RESPIRATION RATE: 18 BRPM | SYSTOLIC BLOOD PRESSURE: 145 MMHG | DIASTOLIC BLOOD PRESSURE: 68 MMHG | OXYGEN SATURATION: 95 % | TEMPERATURE: 98.7 F | BODY MASS INDEX: 28.54 KG/M2 | HEART RATE: 53 BPM

## 2022-04-08 DIAGNOSIS — M81.0 SENILE OSTEOPOROSIS: Primary | ICD-10-CM

## 2022-04-08 LAB
CALCIUM SERPL-MCNC: 8.7 MG/DL (ref 8.5–10.1)
CREAT SERPL-MCNC: 0.63 MG/DL (ref 0.52–1.04)
GFR SERPL CREATININE-BSD FRML MDRD: >90 ML/MIN/1.73M2

## 2022-04-08 PROCEDURE — 96374 THER/PROPH/DIAG INJ IV PUSH: CPT

## 2022-04-08 PROCEDURE — 36415 COLL VENOUS BLD VENIPUNCTURE: CPT | Performed by: INTERNAL MEDICINE

## 2022-04-08 PROCEDURE — 82310 ASSAY OF CALCIUM: CPT | Performed by: INTERNAL MEDICINE

## 2022-04-08 PROCEDURE — 82565 ASSAY OF CREATININE: CPT | Performed by: INTERNAL MEDICINE

## 2022-04-08 PROCEDURE — 250N000011 HC RX IP 250 OP 636: Performed by: INTERNAL MEDICINE

## 2022-04-08 RX ORDER — ALBUTEROL SULFATE 90 UG/1
1-2 AEROSOL, METERED RESPIRATORY (INHALATION)
Status: CANCELLED
Start: 2022-04-08

## 2022-04-08 RX ORDER — ALBUTEROL SULFATE 0.83 MG/ML
2.5 SOLUTION RESPIRATORY (INHALATION)
Status: CANCELLED | OUTPATIENT
Start: 2022-04-08

## 2022-04-08 RX ORDER — MEPERIDINE HYDROCHLORIDE 25 MG/ML
25 INJECTION INTRAMUSCULAR; INTRAVENOUS; SUBCUTANEOUS EVERY 30 MIN PRN
Status: CANCELLED | OUTPATIENT
Start: 2022-04-08

## 2022-04-08 RX ORDER — ZOLEDRONIC ACID 5 MG/100ML
5 INJECTION, SOLUTION INTRAVENOUS ONCE
Status: COMPLETED | OUTPATIENT
Start: 2022-04-08 | End: 2022-04-08

## 2022-04-08 RX ORDER — EPINEPHRINE 1 MG/ML
0.3 INJECTION, SOLUTION INTRAMUSCULAR; SUBCUTANEOUS EVERY 5 MIN PRN
Status: CANCELLED | OUTPATIENT
Start: 2022-04-08

## 2022-04-08 RX ORDER — HEPARIN SODIUM,PORCINE 10 UNIT/ML
5 VIAL (ML) INTRAVENOUS
Status: CANCELLED | OUTPATIENT
Start: 2022-04-08

## 2022-04-08 RX ORDER — METHYLPREDNISOLONE SODIUM SUCCINATE 125 MG/2ML
125 INJECTION, POWDER, LYOPHILIZED, FOR SOLUTION INTRAMUSCULAR; INTRAVENOUS
Status: CANCELLED
Start: 2022-04-08

## 2022-04-08 RX ORDER — NALOXONE HYDROCHLORIDE 0.4 MG/ML
0.2 INJECTION, SOLUTION INTRAMUSCULAR; INTRAVENOUS; SUBCUTANEOUS
Status: CANCELLED | OUTPATIENT
Start: 2022-04-08

## 2022-04-08 RX ORDER — DIPHENHYDRAMINE HYDROCHLORIDE 50 MG/ML
50 INJECTION INTRAMUSCULAR; INTRAVENOUS
Status: CANCELLED
Start: 2022-04-08

## 2022-04-08 RX ORDER — ZOLEDRONIC ACID 5 MG/100ML
5 INJECTION, SOLUTION INTRAVENOUS ONCE
Status: CANCELLED
Start: 2022-04-08 | End: 2022-04-08

## 2022-04-08 RX ORDER — HEPARIN SODIUM (PORCINE) LOCK FLUSH IV SOLN 100 UNIT/ML 100 UNIT/ML
5 SOLUTION INTRAVENOUS
Status: CANCELLED | OUTPATIENT
Start: 2022-04-08

## 2022-04-08 RX ADMIN — ZOLEDRONIC ACID 5 MG: 0.05 INJECTION, SOLUTION INTRAVENOUS at 15:35

## 2022-04-08 ASSESSMENT — PAIN SCALES - GENERAL: PAINLEVEL: NO PAIN (0)

## 2022-04-08 NOTE — PROGRESS NOTES
Infusion Nursing Note:  Mckayla Grady presents today for Reclast, 1st dose.    Patient seen by provider today: No   present during visit today: Not Applicable.    Note: Medication reviewed with patient: potential side effects, sign/symptoms to monitor for and when to seek medical attention. Written material on Reclast given to patient as well. Patient stated understanding, all questions answered.     Confirmed with patient that she is taking Calcium and Vitamin D. Denies and recent or upcoming dental procedures or any dental concerns today.     Administration documentation faxed to Dr. Montserrat Ludwig.     Intravenous Access:  Labs drawn without difficulty.  Peripheral IV placed.    Treatment Conditions:  Results reviewed, labs MET treatment parameters, ok to proceed with treatment.  CrCl: 81.9mL/min  SrCr: 0.63  Calcium: 8.7    Post Infusion Assessment:  Patient tolerated infusion without incident.  Blood return noted pre and post infusion.  Site patent and intact, free from redness, edema or discomfort.  No evidence of extravasations.  Access discontinued per protocol.     Discharge Plan:   Discharge instructions reviewed with: Patient.  Patient and/or family verbalized understanding of discharge instructions and all questions answered.  AVS to patient via [employer].  Patient will schedule next appointment as needed.   Patient discharged in stable condition accompanied by: self.  Departure Mode: Ambulatory.      Neeru Bartholomew RN

## 2022-04-17 ENCOUNTER — HEALTH MAINTENANCE LETTER (OUTPATIENT)
Age: 67
End: 2022-04-17

## 2022-06-10 ENCOUNTER — HOSPITAL ENCOUNTER (OUTPATIENT)
Dept: CT IMAGING | Facility: CLINIC | Age: 67
Discharge: HOME | End: 2022-06-10
Attending: INTERNAL MEDICINE | Admitting: INTERNAL MEDICINE
Payer: COMMERCIAL

## 2022-06-10 DIAGNOSIS — R91.8 LUNG NODULES: ICD-10-CM

## 2022-06-10 DIAGNOSIS — C24.1 CARCINOMA OF AMPULLA OF VATER (H): ICD-10-CM

## 2022-06-10 PROCEDURE — 74177 CT ABD & PELVIS W/CONTRAST: CPT

## 2022-06-10 PROCEDURE — 250N000011 HC RX IP 250 OP 636: Performed by: INTERNAL MEDICINE

## 2022-06-10 PROCEDURE — 250N000009 HC RX 250: Performed by: INTERNAL MEDICINE

## 2022-06-10 RX ORDER — IOPAMIDOL 755 MG/ML
500 INJECTION, SOLUTION INTRAVASCULAR ONCE
Status: COMPLETED | OUTPATIENT
Start: 2022-06-10 | End: 2022-06-10

## 2022-06-10 RX ADMIN — IOPAMIDOL 79 ML: 755 INJECTION, SOLUTION INTRAVENOUS at 07:52

## 2022-06-10 RX ADMIN — SODIUM CHLORIDE 60 ML: 9 INJECTION, SOLUTION INTRAVENOUS at 07:52

## 2022-06-17 ENCOUNTER — TRANSFERRED RECORDS (OUTPATIENT)
Dept: HEALTH INFORMATION MANAGEMENT | Facility: CLINIC | Age: 67
End: 2022-06-17

## 2022-10-05 ENCOUNTER — HOSPITAL ENCOUNTER (OUTPATIENT)
Dept: MAMMOGRAPHY | Facility: CLINIC | Age: 67
Discharge: HOME | End: 2022-10-05
Attending: FAMILY MEDICINE | Admitting: FAMILY MEDICINE
Payer: COMMERCIAL

## 2022-10-05 DIAGNOSIS — Z12.31 VISIT FOR SCREENING MAMMOGRAM: ICD-10-CM

## 2022-10-05 PROCEDURE — 77067 SCR MAMMO BI INCL CAD: CPT

## 2022-10-23 ENCOUNTER — HEALTH MAINTENANCE LETTER (OUTPATIENT)
Age: 67
End: 2022-10-23

## 2023-05-02 ENCOUNTER — TRANSFERRED RECORDS (OUTPATIENT)
Dept: HEALTH INFORMATION MANAGEMENT | Facility: CLINIC | Age: 68
End: 2023-05-02
Payer: COMMERCIAL

## 2023-06-26 ENCOUNTER — MEDICAL CORRESPONDENCE (OUTPATIENT)
Dept: HEALTH INFORMATION MANAGEMENT | Facility: CLINIC | Age: 68
End: 2023-06-26
Payer: COMMERCIAL

## 2023-06-27 DIAGNOSIS — R01.1 HEART MURMUR: Primary | ICD-10-CM

## 2023-06-28 ENCOUNTER — HOSPITAL ENCOUNTER (OUTPATIENT)
Dept: CARDIOLOGY | Facility: CLINIC | Age: 68
Discharge: HOME | End: 2023-06-28
Attending: FAMILY MEDICINE | Admitting: FAMILY MEDICINE
Payer: COMMERCIAL

## 2023-06-28 DIAGNOSIS — R01.1 HEART MURMUR: ICD-10-CM

## 2023-06-28 LAB — LVEF ECHO: NORMAL

## 2023-06-28 PROCEDURE — 93306 TTE W/DOPPLER COMPLETE: CPT | Mod: 26 | Performed by: INTERNAL MEDICINE

## 2023-06-28 PROCEDURE — 93306 TTE W/DOPPLER COMPLETE: CPT

## 2023-07-12 ENCOUNTER — TRANSFERRED RECORDS (OUTPATIENT)
Dept: HEALTH INFORMATION MANAGEMENT | Facility: CLINIC | Age: 68
End: 2023-07-12
Payer: COMMERCIAL

## 2023-07-12 ENCOUNTER — MEDICAL CORRESPONDENCE (OUTPATIENT)
Dept: HEALTH INFORMATION MANAGEMENT | Facility: CLINIC | Age: 68
End: 2023-07-12
Payer: COMMERCIAL

## 2023-07-13 RX ORDER — EPINEPHRINE 1 MG/ML
0.3 INJECTION, SOLUTION INTRAMUSCULAR; SUBCUTANEOUS EVERY 5 MIN PRN
Status: CANCELLED | OUTPATIENT
Start: 2023-07-13

## 2023-07-13 RX ORDER — MEPERIDINE HYDROCHLORIDE 25 MG/ML
25 INJECTION INTRAMUSCULAR; INTRAVENOUS; SUBCUTANEOUS EVERY 30 MIN PRN
Status: CANCELLED | OUTPATIENT
Start: 2023-07-13

## 2023-07-13 RX ORDER — DIPHENHYDRAMINE HYDROCHLORIDE 50 MG/ML
50 INJECTION INTRAMUSCULAR; INTRAVENOUS
Status: CANCELLED
Start: 2023-07-13

## 2023-07-13 RX ORDER — HEPARIN SODIUM,PORCINE 10 UNIT/ML
5 VIAL (ML) INTRAVENOUS
Status: CANCELLED | OUTPATIENT
Start: 2023-07-13

## 2023-07-13 RX ORDER — HEPARIN SODIUM (PORCINE) LOCK FLUSH IV SOLN 100 UNIT/ML 100 UNIT/ML
5 SOLUTION INTRAVENOUS
Status: CANCELLED | OUTPATIENT
Start: 2023-07-13

## 2023-07-13 RX ORDER — METHYLPREDNISOLONE SODIUM SUCCINATE 125 MG/2ML
125 INJECTION, POWDER, LYOPHILIZED, FOR SOLUTION INTRAMUSCULAR; INTRAVENOUS
Status: CANCELLED
Start: 2023-07-13

## 2023-07-13 RX ORDER — ALBUTEROL SULFATE 0.83 MG/ML
2.5 SOLUTION RESPIRATORY (INHALATION)
Status: CANCELLED | OUTPATIENT
Start: 2023-07-13

## 2023-07-13 RX ORDER — ALBUTEROL SULFATE 90 UG/1
1-2 AEROSOL, METERED RESPIRATORY (INHALATION)
Status: CANCELLED
Start: 2023-07-13

## 2023-07-13 RX ORDER — NALOXONE HYDROCHLORIDE 0.4 MG/ML
0.2 INJECTION, SOLUTION INTRAMUSCULAR; INTRAVENOUS; SUBCUTANEOUS
Status: CANCELLED | OUTPATIENT
Start: 2023-07-13

## 2023-08-10 ENCOUNTER — INFUSION THERAPY VISIT (OUTPATIENT)
Dept: INFUSION THERAPY | Facility: CLINIC | Age: 68
End: 2023-08-10
Attending: INTERNAL MEDICINE
Payer: COMMERCIAL

## 2023-08-10 VITALS
SYSTOLIC BLOOD PRESSURE: 121 MMHG | RESPIRATION RATE: 18 BRPM | HEART RATE: 49 BPM | DIASTOLIC BLOOD PRESSURE: 62 MMHG | WEIGHT: 152.9 LBS | BODY MASS INDEX: 27.09 KG/M2 | TEMPERATURE: 98.2 F | HEIGHT: 63 IN | OXYGEN SATURATION: 97 %

## 2023-08-10 DIAGNOSIS — M81.0 SENILE OSTEOPOROSIS: Primary | ICD-10-CM

## 2023-08-10 PROCEDURE — 250N000011 HC RX IP 250 OP 636: Mod: JZ | Performed by: INTERNAL MEDICINE

## 2023-08-10 PROCEDURE — 258N000003 HC RX IP 258 OP 636: Performed by: INTERNAL MEDICINE

## 2023-08-10 PROCEDURE — 96365 THER/PROPH/DIAG IV INF INIT: CPT

## 2023-08-10 RX ORDER — ALBUTEROL SULFATE 0.83 MG/ML
2.5 SOLUTION RESPIRATORY (INHALATION)
Status: CANCELLED | OUTPATIENT
Start: 2024-08-09

## 2023-08-10 RX ORDER — HEPARIN SODIUM,PORCINE 10 UNIT/ML
5 VIAL (ML) INTRAVENOUS
Status: CANCELLED | OUTPATIENT
Start: 2024-08-09

## 2023-08-10 RX ORDER — NALOXONE HYDROCHLORIDE 0.4 MG/ML
0.2 INJECTION, SOLUTION INTRAMUSCULAR; INTRAVENOUS; SUBCUTANEOUS
Status: CANCELLED | OUTPATIENT
Start: 2024-08-09

## 2023-08-10 RX ORDER — MEPERIDINE HYDROCHLORIDE 25 MG/ML
25 INJECTION INTRAMUSCULAR; INTRAVENOUS; SUBCUTANEOUS EVERY 30 MIN PRN
Status: CANCELLED | OUTPATIENT
Start: 2024-08-09

## 2023-08-10 RX ORDER — METHYLPREDNISOLONE SODIUM SUCCINATE 125 MG/2ML
125 INJECTION, POWDER, LYOPHILIZED, FOR SOLUTION INTRAMUSCULAR; INTRAVENOUS
Status: CANCELLED
Start: 2024-08-09

## 2023-08-10 RX ORDER — HEPARIN SODIUM (PORCINE) LOCK FLUSH IV SOLN 100 UNIT/ML 100 UNIT/ML
5 SOLUTION INTRAVENOUS
Status: CANCELLED | OUTPATIENT
Start: 2024-08-09

## 2023-08-10 RX ORDER — DIPHENHYDRAMINE HYDROCHLORIDE 50 MG/ML
50 INJECTION INTRAMUSCULAR; INTRAVENOUS
Status: CANCELLED
Start: 2024-08-09

## 2023-08-10 RX ORDER — ZOLEDRONIC ACID 5 MG/100ML
5 INJECTION, SOLUTION INTRAVENOUS ONCE
Status: COMPLETED | OUTPATIENT
Start: 2023-08-10 | End: 2023-08-10

## 2023-08-10 RX ORDER — ZOLEDRONIC ACID 5 MG/100ML
5 INJECTION, SOLUTION INTRAVENOUS ONCE
Status: CANCELLED
Start: 2024-08-09 | End: 2024-08-09

## 2023-08-10 RX ORDER — ALBUTEROL SULFATE 90 UG/1
1-2 AEROSOL, METERED RESPIRATORY (INHALATION)
Status: CANCELLED
Start: 2024-08-09

## 2023-08-10 RX ORDER — EPINEPHRINE 1 MG/ML
0.3 INJECTION, SOLUTION INTRAMUSCULAR; SUBCUTANEOUS EVERY 5 MIN PRN
Status: CANCELLED | OUTPATIENT
Start: 2024-08-09

## 2023-08-10 RX ADMIN — ZOLEDRONIC ACID 5 MG: 0.05 INJECTION, SOLUTION INTRAVENOUS at 09:48

## 2023-08-10 RX ADMIN — SODIUM CHLORIDE 250 ML: 9 INJECTION, SOLUTION INTRAVENOUS at 09:48

## 2023-08-27 ENCOUNTER — HEALTH MAINTENANCE LETTER (OUTPATIENT)
Age: 68
End: 2023-08-27

## 2023-09-28 ENCOUNTER — ANCILLARY PROCEDURE (OUTPATIENT)
Dept: CT IMAGING | Facility: CLINIC | Age: 68
End: 2023-09-28
Attending: INTERNAL MEDICINE
Payer: COMMERCIAL

## 2023-09-28 DIAGNOSIS — C24.1 CARCINOMA OF AMPULLA OF VATER (H): ICD-10-CM

## 2023-09-28 LAB
CREAT BLD-MCNC: 0.7 MG/DL (ref 0.5–1)
EGFRCR SERPLBLD CKD-EPI 2021: >60 ML/MIN/1.73M2

## 2023-09-28 PROCEDURE — 250N000011 HC RX IP 250 OP 636: Performed by: INTERNAL MEDICINE

## 2023-09-28 PROCEDURE — 74177 CT ABD & PELVIS W/CONTRAST: CPT

## 2023-09-28 PROCEDURE — 82565 ASSAY OF CREATININE: CPT

## 2023-09-28 PROCEDURE — 250N000009 HC RX 250: Performed by: INTERNAL MEDICINE

## 2023-09-28 RX ORDER — IOPAMIDOL 755 MG/ML
90 INJECTION, SOLUTION INTRAVASCULAR ONCE
Status: COMPLETED | OUTPATIENT
Start: 2023-09-28 | End: 2023-09-28

## 2023-09-28 RX ADMIN — IOPAMIDOL 90 ML: 755 INJECTION, SOLUTION INTRAVENOUS at 10:03

## 2023-09-28 RX ADMIN — SODIUM CHLORIDE 40 ML: 9 INJECTION, SOLUTION INTRAVENOUS at 10:03

## 2023-10-13 ENCOUNTER — TRANSFERRED RECORDS (OUTPATIENT)
Dept: HEALTH INFORMATION MANAGEMENT | Facility: CLINIC | Age: 68
End: 2023-10-13
Payer: COMMERCIAL

## 2023-11-16 ENCOUNTER — HOSPITAL ENCOUNTER (OUTPATIENT)
Dept: MAMMOGRAPHY | Facility: CLINIC | Age: 68
Discharge: HOME | End: 2023-11-16
Attending: FAMILY MEDICINE | Admitting: FAMILY MEDICINE
Payer: COMMERCIAL

## 2023-11-16 DIAGNOSIS — Z12.31 VISIT FOR SCREENING MAMMOGRAM: ICD-10-CM

## 2023-11-16 PROCEDURE — 77067 SCR MAMMO BI INCL CAD: CPT

## 2024-07-17 ENCOUNTER — TRANSFERRED RECORDS (OUTPATIENT)
Dept: HEALTH INFORMATION MANAGEMENT | Facility: CLINIC | Age: 69
End: 2024-07-17
Payer: COMMERCIAL

## 2024-07-19 ENCOUNTER — MEDICAL CORRESPONDENCE (OUTPATIENT)
Dept: HEALTH INFORMATION MANAGEMENT | Facility: CLINIC | Age: 69
End: 2024-07-19
Payer: COMMERCIAL

## 2024-07-22 DIAGNOSIS — M81.0 SENILE OSTEOPOROSIS: Primary | ICD-10-CM

## 2024-07-22 RX ORDER — HEPARIN SODIUM,PORCINE 10 UNIT/ML
5-20 VIAL (ML) INTRAVENOUS DAILY PRN
Status: CANCELLED | OUTPATIENT
Start: 2024-07-22

## 2024-07-22 RX ORDER — EPINEPHRINE 1 MG/ML
0.3 INJECTION, SOLUTION INTRAMUSCULAR; SUBCUTANEOUS EVERY 5 MIN PRN
Status: CANCELLED | OUTPATIENT
Start: 2024-07-22

## 2024-07-22 RX ORDER — ALBUTEROL SULFATE 90 UG/1
1-2 AEROSOL, METERED RESPIRATORY (INHALATION)
Status: CANCELLED
Start: 2024-07-22

## 2024-07-22 RX ORDER — DIPHENHYDRAMINE HYDROCHLORIDE 50 MG/ML
50 INJECTION INTRAMUSCULAR; INTRAVENOUS
Status: CANCELLED
Start: 2024-07-22

## 2024-07-22 RX ORDER — HEPARIN SODIUM (PORCINE) LOCK FLUSH IV SOLN 100 UNIT/ML 100 UNIT/ML
5 SOLUTION INTRAVENOUS
Status: CANCELLED | OUTPATIENT
Start: 2024-07-22

## 2024-07-22 RX ORDER — ZOLEDRONIC ACID 5 MG/100ML
5 INJECTION, SOLUTION INTRAVENOUS ONCE
Status: CANCELLED
Start: 2024-08-12

## 2024-07-22 RX ORDER — ALBUTEROL SULFATE 0.83 MG/ML
2.5 SOLUTION RESPIRATORY (INHALATION)
Status: CANCELLED | OUTPATIENT
Start: 2024-07-22

## 2024-07-22 RX ORDER — METHYLPREDNISOLONE SODIUM SUCCINATE 125 MG/2ML
125 INJECTION, POWDER, LYOPHILIZED, FOR SOLUTION INTRAMUSCULAR; INTRAVENOUS
Status: CANCELLED
Start: 2024-07-22

## 2024-07-22 RX ORDER — MEPERIDINE HYDROCHLORIDE 25 MG/ML
25 INJECTION INTRAMUSCULAR; INTRAVENOUS; SUBCUTANEOUS EVERY 30 MIN PRN
Status: CANCELLED | OUTPATIENT
Start: 2024-07-22

## 2024-08-11 ENCOUNTER — HEALTH MAINTENANCE LETTER (OUTPATIENT)
Age: 69
End: 2024-08-11

## 2024-08-12 ENCOUNTER — INFUSION THERAPY VISIT (OUTPATIENT)
Dept: INFUSION THERAPY | Facility: CLINIC | Age: 69
End: 2024-08-12
Attending: INTERNAL MEDICINE
Payer: COMMERCIAL

## 2024-08-12 VITALS
DIASTOLIC BLOOD PRESSURE: 56 MMHG | RESPIRATION RATE: 18 BRPM | SYSTOLIC BLOOD PRESSURE: 105 MMHG | HEART RATE: 64 BPM | OXYGEN SATURATION: 95 % | BODY MASS INDEX: 24.45 KG/M2 | HEIGHT: 63 IN | WEIGHT: 138 LBS | TEMPERATURE: 98.3 F

## 2024-08-12 DIAGNOSIS — M81.0 SENILE OSTEOPOROSIS: Primary | ICD-10-CM

## 2024-08-12 PROCEDURE — 250N000011 HC RX IP 250 OP 636: Performed by: INTERNAL MEDICINE

## 2024-08-12 PROCEDURE — 96365 THER/PROPH/DIAG IV INF INIT: CPT

## 2024-08-12 PROCEDURE — 258N000003 HC RX IP 258 OP 636: Performed by: INTERNAL MEDICINE

## 2024-08-12 RX ORDER — DIPHENHYDRAMINE HYDROCHLORIDE 50 MG/ML
50 INJECTION INTRAMUSCULAR; INTRAVENOUS
Start: 2025-08-12

## 2024-08-12 RX ORDER — ALBUTEROL SULFATE 0.83 MG/ML
2.5 SOLUTION RESPIRATORY (INHALATION)
OUTPATIENT
Start: 2025-08-12

## 2024-08-12 RX ORDER — ZOLEDRONIC ACID 5 MG/100ML
5 INJECTION, SOLUTION INTRAVENOUS ONCE
Start: 2025-08-12

## 2024-08-12 RX ORDER — ALBUTEROL SULFATE 90 UG/1
1-2 AEROSOL, METERED RESPIRATORY (INHALATION)
Start: 2025-08-12

## 2024-08-12 RX ORDER — ZOLEDRONIC ACID 5 MG/100ML
5 INJECTION, SOLUTION INTRAVENOUS ONCE
Status: COMPLETED | OUTPATIENT
Start: 2024-08-12 | End: 2024-08-12

## 2024-08-12 RX ORDER — HEPARIN SODIUM,PORCINE 10 UNIT/ML
5-20 VIAL (ML) INTRAVENOUS DAILY PRN
OUTPATIENT
Start: 2025-08-12

## 2024-08-12 RX ORDER — HEPARIN SODIUM (PORCINE) LOCK FLUSH IV SOLN 100 UNIT/ML 100 UNIT/ML
5 SOLUTION INTRAVENOUS
OUTPATIENT
Start: 2025-08-12

## 2024-08-12 RX ORDER — EPINEPHRINE 1 MG/ML
0.3 INJECTION, SOLUTION INTRAMUSCULAR; SUBCUTANEOUS EVERY 5 MIN PRN
OUTPATIENT
Start: 2025-08-12

## 2024-08-12 RX ORDER — MEPERIDINE HYDROCHLORIDE 25 MG/ML
25 INJECTION INTRAMUSCULAR; INTRAVENOUS; SUBCUTANEOUS EVERY 30 MIN PRN
OUTPATIENT
Start: 2025-08-12

## 2024-08-12 RX ORDER — METHYLPREDNISOLONE SODIUM SUCCINATE 125 MG/2ML
125 INJECTION, POWDER, LYOPHILIZED, FOR SOLUTION INTRAMUSCULAR; INTRAVENOUS
Start: 2025-08-12

## 2024-08-12 RX ADMIN — SODIUM CHLORIDE 250 ML: 9 INJECTION, SOLUTION INTRAVENOUS at 13:41

## 2024-08-12 RX ADMIN — ZOLEDRONIC ACID 5 MG: 0.05 INJECTION, SOLUTION INTRAVENOUS at 13:42

## 2024-08-12 NOTE — PROGRESS NOTES
Infusion Nursing Note:  Mckayla Grady presents today for Reclast.    Patient seen by provider today: No   present during visit today: Not Applicable.    Note: labs taken from eal (shown to pharmacist).  Pt denies any upcoming or recent dental work and confirms taking vit D and calcium.      Intravenous Access:  Peripheral IV placed.    Treatment Conditions:  Results reviewed, labs MET treatment parameters, ok to proceed with treatment.      Post Infusion Assessment:  Patient tolerated infusion without incident.  Blood return noted pre and post infusion.  Site patent and intact, free from redness, edema or discomfort.  No evidence of extravasations.  Access discontinued per protocol.       Discharge Plan:   Discharge instructions reviewed with: Patient.  Patient and/or family verbalized understanding of discharge instructions and all questions answered.  AVS to patient via [employer].  Patient will return next year for next appointment.   Patient discharged in stable condition accompanied by: self.  Departure Mode: Ambulatory.      Kari Schoen, RN

## 2024-10-14 ENCOUNTER — ANCILLARY PROCEDURE (OUTPATIENT)
Dept: CT IMAGING | Facility: CLINIC | Age: 69
End: 2024-10-14
Attending: INTERNAL MEDICINE
Payer: COMMERCIAL

## 2024-10-14 DIAGNOSIS — C24.1 CARCINOMA OF AMPULLA OF VATER (H): ICD-10-CM

## 2024-10-14 LAB
CREAT BLD-MCNC: 0.7 MG/DL (ref 0.5–1)
EGFRCR SERPLBLD CKD-EPI 2021: >60 ML/MIN/1.73M2

## 2024-10-14 PROCEDURE — 250N000011 HC RX IP 250 OP 636: Performed by: INTERNAL MEDICINE

## 2024-10-14 PROCEDURE — 250N000009 HC RX 250: Performed by: INTERNAL MEDICINE

## 2024-10-14 PROCEDURE — 74177 CT ABD & PELVIS W/CONTRAST: CPT

## 2024-10-14 PROCEDURE — 82565 ASSAY OF CREATININE: CPT

## 2024-10-14 RX ORDER — IOPAMIDOL 755 MG/ML
81 INJECTION, SOLUTION INTRAVASCULAR ONCE
Status: COMPLETED | OUTPATIENT
Start: 2024-10-14 | End: 2024-10-14

## 2024-10-14 RX ADMIN — SODIUM CHLORIDE 40 ML: 9 INJECTION, SOLUTION INTRAVENOUS at 14:11

## 2024-10-14 RX ADMIN — IOPAMIDOL 81 ML: 755 INJECTION, SOLUTION INTRAVENOUS at 14:11

## 2024-10-18 ENCOUNTER — TRANSFERRED RECORDS (OUTPATIENT)
Dept: HEALTH INFORMATION MANAGEMENT | Facility: CLINIC | Age: 69
End: 2024-10-18
Payer: COMMERCIAL

## 2024-10-21 ENCOUNTER — NURSE TRIAGE (OUTPATIENT)
Dept: NURSING | Facility: CLINIC | Age: 69
End: 2024-10-21
Payer: COMMERCIAL

## 2024-10-21 ENCOUNTER — HOSPITAL ENCOUNTER (OUTPATIENT)
Dept: MRI IMAGING | Facility: CLINIC | Age: 69
Discharge: HOME | End: 2024-10-21
Attending: INTERNAL MEDICINE | Admitting: INTERNAL MEDICINE
Payer: COMMERCIAL

## 2024-10-21 ENCOUNTER — HOSPITAL ENCOUNTER (EMERGENCY)
Facility: CLINIC | Age: 69
Discharge: HOME | End: 2024-10-21
Payer: COMMERCIAL

## 2024-10-21 DIAGNOSIS — K76.89 COMPENSATORY LOBAR HYPERPLASIA OF LIVER: ICD-10-CM

## 2024-10-21 DIAGNOSIS — C24.1 MALIGNANT NEOPLASM OF AMPULLA OF VATER (H): ICD-10-CM

## 2024-10-21 PROCEDURE — 255N000002 HC RX 255 OP 636: Performed by: INTERNAL MEDICINE

## 2024-10-21 PROCEDURE — A9585 GADOBUTROL INJECTION: HCPCS | Performed by: INTERNAL MEDICINE

## 2024-10-21 PROCEDURE — 74183 MRI ABD W/O CNTR FLWD CNTR: CPT

## 2024-10-21 RX ORDER — GADOBUTROL 604.72 MG/ML
6 INJECTION INTRAVENOUS ONCE
Status: COMPLETED | OUTPATIENT
Start: 2024-10-21 | End: 2024-10-21

## 2024-10-21 RX ADMIN — GADOBUTROL 6 ML: 604.72 INJECTION INTRAVENOUS at 11:07

## 2024-10-21 ASSESSMENT — ACTIVITIES OF DAILY LIVING (ADL): ADLS_ACUITY_SCORE: 35

## 2024-10-22 NOTE — TELEPHONE ENCOUNTER
MRI today. She has stabbing pain in bladder. Pain at 8. Pain in stomach as well, gnawing, acidy pain. Took omeprazole and gas x and it hasn't helped. She will go to [facility].    Rachana Frey RN  [facility]    Reason for Disposition   [1] SEVERE pain (e.g., excruciating) AND [2] present > 1 hour     Pain at 8, ramped up.    Additional Information   Negative: Shock suspected (e.g., cold/pale/clammy skin, too weak to stand, low BP, rapid pulse)   Negative: Difficult to awaken or acting confused (e.g., disoriented, slurred speech)   Negative: Passed out (i.e., lost consciousness, collapsed and was not responding)   Negative: Sounds like a life-threatening emergency to the triager   Negative: Followed an abdomen (stomach) injury    Protocols used: Abdominal Pain - Female-A-AH

## 2024-11-14 ENCOUNTER — HOSPITAL ENCOUNTER (INPATIENT)
Facility: CLINIC | Age: 69
LOS: 5 days | Discharge: HOME | DRG: 389 | End: 2024-11-19
Attending: SOCIAL WORKER | Admitting: HOSPITALIST
Payer: COMMERCIAL

## 2024-11-14 ENCOUNTER — NURSE TRIAGE (OUTPATIENT)
Dept: NURSING | Facility: CLINIC | Age: 69
End: 2024-11-14
Payer: COMMERCIAL

## 2024-11-14 ENCOUNTER — APPOINTMENT (OUTPATIENT)
Dept: CT IMAGING | Facility: CLINIC | Age: 69
DRG: 389 | End: 2024-11-14
Attending: SOCIAL WORKER
Payer: COMMERCIAL

## 2024-11-14 DIAGNOSIS — D72.829 LEUKOCYTOSIS, UNSPECIFIED TYPE: ICD-10-CM

## 2024-11-14 DIAGNOSIS — F51.04 PSYCHOPHYSIOLOGICAL INSOMNIA: ICD-10-CM

## 2024-11-14 DIAGNOSIS — K59.00 CONSTIPATION, UNSPECIFIED CONSTIPATION TYPE: ICD-10-CM

## 2024-11-14 DIAGNOSIS — R11.0 NAUSEA: Primary | ICD-10-CM

## 2024-11-14 DIAGNOSIS — R18.8 PELVIC FLUID COLLECTION: ICD-10-CM

## 2024-11-14 DIAGNOSIS — K56.609 SMALL BOWEL OBSTRUCTION (H): ICD-10-CM

## 2024-11-14 LAB
ALBUMIN SERPL BCG-MCNC: 3.8 G/DL (ref 3.5–5.2)
ALP SERPL-CCNC: 143 U/L (ref 40–150)
ALT SERPL W P-5'-P-CCNC: 12 U/L (ref 0–50)
ANION GAP SERPL CALCULATED.3IONS-SCNC: 11 MMOL/L (ref 7–15)
AST SERPL W P-5'-P-CCNC: 15 U/L (ref 0–45)
BASOPHILS # BLD AUTO: 0.1 10E3/UL (ref 0–0.2)
BASOPHILS NFR BLD AUTO: 0 %
BILIRUB SERPL-MCNC: 0.4 MG/DL
BUN SERPL-MCNC: 13.7 MG/DL (ref 8–23)
CALCIUM SERPL-MCNC: 9.2 MG/DL (ref 8.8–10.4)
CHLORIDE SERPL-SCNC: 103 MMOL/L (ref 98–107)
CREAT SERPL-MCNC: 0.53 MG/DL (ref 0.51–0.95)
EGFRCR SERPLBLD CKD-EPI 2021: >90 ML/MIN/1.73M2
EOSINOPHIL # BLD AUTO: 0.1 10E3/UL (ref 0–0.7)
EOSINOPHIL NFR BLD AUTO: 1 %
ERYTHROCYTE [DISTWIDTH] IN BLOOD BY AUTOMATED COUNT: 11.9 % (ref 10–15)
GLUCOSE SERPL-MCNC: 127 MG/DL (ref 70–99)
HCO3 SERPL-SCNC: 25 MMOL/L (ref 22–29)
HCT VFR BLD AUTO: 32.3 % (ref 35–47)
HGB BLD-MCNC: 11.2 G/DL (ref 11.7–15.7)
IMM GRANULOCYTES # BLD: 0.1 10E3/UL
IMM GRANULOCYTES NFR BLD: 1 %
LIPASE SERPL-CCNC: 41 U/L (ref 13–60)
LYMPHOCYTES # BLD AUTO: 1.7 10E3/UL (ref 0.8–5.3)
LYMPHOCYTES NFR BLD AUTO: 12 %
MCH RBC QN AUTO: 33.2 PG (ref 26.5–33)
MCHC RBC AUTO-ENTMCNC: 34.7 G/DL (ref 31.5–36.5)
MCV RBC AUTO: 96 FL (ref 78–100)
MONOCYTES # BLD AUTO: 1.2 10E3/UL (ref 0–1.3)
MONOCYTES NFR BLD AUTO: 9 %
NEUTROPHILS # BLD AUTO: 10.6 10E3/UL (ref 1.6–8.3)
NEUTROPHILS NFR BLD AUTO: 78 %
NRBC # BLD AUTO: 0 10E3/UL
NRBC BLD AUTO-RTO: 0 /100
PLATELET # BLD AUTO: 533 10E3/UL (ref 150–450)
POTASSIUM SERPL-SCNC: 3.9 MMOL/L (ref 3.4–5.3)
PROT SERPL-MCNC: 7.4 G/DL (ref 6.4–8.3)
RBC # BLD AUTO: 3.37 10E6/UL (ref 3.8–5.2)
SODIUM SERPL-SCNC: 139 MMOL/L (ref 135–145)
WBC # BLD AUTO: 13.6 10E3/UL (ref 4–11)

## 2024-11-14 PROCEDURE — 96375 TX/PRO/DX INJ NEW DRUG ADDON: CPT

## 2024-11-14 PROCEDURE — 96361 HYDRATE IV INFUSION ADD-ON: CPT

## 2024-11-14 PROCEDURE — 99222 1ST HOSP IP/OBS MODERATE 55: CPT | Performed by: SURGERY

## 2024-11-14 PROCEDURE — 120N000001 HC R&B MED SURG/OB

## 2024-11-14 PROCEDURE — 84155 ASSAY OF PROTEIN SERUM: CPT | Performed by: SOCIAL WORKER

## 2024-11-14 PROCEDURE — 82374 ASSAY BLOOD CARBON DIOXIDE: CPT | Performed by: SOCIAL WORKER

## 2024-11-14 PROCEDURE — 250N000011 HC RX IP 250 OP 636: Performed by: SOCIAL WORKER

## 2024-11-14 PROCEDURE — 250N000013 HC RX MED GY IP 250 OP 250 PS 637: Performed by: HOSPITALIST

## 2024-11-14 PROCEDURE — 36415 COLL VENOUS BLD VENIPUNCTURE: CPT | Performed by: SOCIAL WORKER

## 2024-11-14 PROCEDURE — 99285 EMERGENCY DEPT VISIT HI MDM: CPT | Mod: 25

## 2024-11-14 PROCEDURE — 258N000003 HC RX IP 258 OP 636: Performed by: HOSPITALIST

## 2024-11-14 PROCEDURE — 84075 ASSAY ALKALINE PHOSPHATASE: CPT | Performed by: SOCIAL WORKER

## 2024-11-14 PROCEDURE — 83690 ASSAY OF LIPASE: CPT | Performed by: SOCIAL WORKER

## 2024-11-14 PROCEDURE — 99223 1ST HOSP IP/OBS HIGH 75: CPT | Performed by: HOSPITALIST

## 2024-11-14 PROCEDURE — 74177 CT ABD & PELVIS W/CONTRAST: CPT

## 2024-11-14 PROCEDURE — 96374 THER/PROPH/DIAG INJ IV PUSH: CPT | Mod: 59

## 2024-11-14 PROCEDURE — 250N000009 HC RX 250: Performed by: SOCIAL WORKER

## 2024-11-14 PROCEDURE — 258N000003 HC RX IP 258 OP 636: Performed by: SOCIAL WORKER

## 2024-11-14 PROCEDURE — 85004 AUTOMATED DIFF WBC COUNT: CPT | Performed by: SOCIAL WORKER

## 2024-11-14 RX ORDER — POLYETHYLENE GLYCOL 3350 17 G/17G
1 POWDER, FOR SOLUTION ORAL DAILY PRN
COMMUNITY

## 2024-11-14 RX ORDER — PROCHLORPERAZINE MALEATE 5 MG/1
5 TABLET ORAL EVERY 6 HOURS PRN
Status: DISCONTINUED | OUTPATIENT
Start: 2024-11-14 | End: 2024-11-19 | Stop reason: HOSPADM

## 2024-11-14 RX ORDER — LORAZEPAM 1 MG/1
1 TABLET ORAL DAILY PRN
COMMUNITY

## 2024-11-14 RX ORDER — SODIUM CHLORIDE, SODIUM LACTATE, POTASSIUM CHLORIDE, CALCIUM CHLORIDE 600; 310; 30; 20 MG/100ML; MG/100ML; MG/100ML; MG/100ML
INJECTION, SOLUTION INTRAVENOUS CONTINUOUS
Status: ACTIVE | OUTPATIENT
Start: 2024-11-14 | End: 2024-11-15

## 2024-11-14 RX ORDER — OXYCODONE HYDROCHLORIDE 5 MG/1
5 TABLET ORAL EVERY 4 HOURS PRN
Status: DISCONTINUED | OUTPATIENT
Start: 2024-11-14 | End: 2024-11-19 | Stop reason: HOSPADM

## 2024-11-14 RX ORDER — HYDROMORPHONE HYDROCHLORIDE 1 MG/ML
0.5 INJECTION, SOLUTION INTRAMUSCULAR; INTRAVENOUS; SUBCUTANEOUS
Status: DISCONTINUED | OUTPATIENT
Start: 2024-11-14 | End: 2024-11-19 | Stop reason: HOSPADM

## 2024-11-14 RX ORDER — LIDOCAINE 40 MG/G
CREAM TOPICAL
Status: DISCONTINUED | OUTPATIENT
Start: 2024-11-14 | End: 2024-11-19 | Stop reason: HOSPADM

## 2024-11-14 RX ORDER — ACETAMINOPHEN 650 MG/1
650 SUPPOSITORY RECTAL EVERY 4 HOURS PRN
Status: DISCONTINUED | OUTPATIENT
Start: 2024-11-14 | End: 2024-11-19 | Stop reason: HOSPADM

## 2024-11-14 RX ORDER — IOPAMIDOL 755 MG/ML
64 INJECTION, SOLUTION INTRAVASCULAR ONCE
Status: DISCONTINUED | OUTPATIENT
Start: 2024-11-14 | End: 2024-11-14

## 2024-11-14 RX ORDER — HYDROMORPHONE HYDROCHLORIDE 1 MG/ML
0.5 INJECTION, SOLUTION INTRAMUSCULAR; INTRAVENOUS; SUBCUTANEOUS
Status: DISCONTINUED | OUTPATIENT
Start: 2024-11-14 | End: 2024-11-14

## 2024-11-14 RX ORDER — ONDANSETRON 2 MG/ML
4 INJECTION INTRAMUSCULAR; INTRAVENOUS ONCE
Status: DISCONTINUED | OUTPATIENT
Start: 2024-11-14 | End: 2024-11-14

## 2024-11-14 RX ORDER — GABAPENTIN 300 MG/1
300 CAPSULE ORAL 2 TIMES DAILY
COMMUNITY

## 2024-11-14 RX ORDER — ACETAMINOPHEN 325 MG/1
650 TABLET ORAL EVERY 4 HOURS PRN
Status: DISCONTINUED | OUTPATIENT
Start: 2024-11-14 | End: 2024-11-19 | Stop reason: HOSPADM

## 2024-11-14 RX ORDER — ESTRADIOL 0.1 MG/G
CREAM VAGINAL
COMMUNITY

## 2024-11-14 RX ORDER — CALCIUM CARBONATE 500 MG/1
1000 TABLET, CHEWABLE ORAL 4 TIMES DAILY PRN
Status: DISCONTINUED | OUTPATIENT
Start: 2024-11-14 | End: 2024-11-19 | Stop reason: HOSPADM

## 2024-11-14 RX ORDER — GABAPENTIN 300 MG/1
300 CAPSULE ORAL 2 TIMES DAILY
Status: DISCONTINUED | OUTPATIENT
Start: 2024-11-15 | End: 2024-11-19 | Stop reason: HOSPADM

## 2024-11-14 RX ORDER — ONDANSETRON 4 MG/1
4 TABLET, ORALLY DISINTEGRATING ORAL EVERY 6 HOURS PRN
Status: DISCONTINUED | OUTPATIENT
Start: 2024-11-14 | End: 2024-11-19 | Stop reason: HOSPADM

## 2024-11-14 RX ORDER — ALBUTEROL SULFATE 90 UG/1
2 INHALANT RESPIRATORY (INHALATION) EVERY 4 HOURS PRN
Status: DISCONTINUED | OUTPATIENT
Start: 2024-11-14 | End: 2024-11-19 | Stop reason: HOSPADM

## 2024-11-14 RX ORDER — ONDANSETRON 2 MG/ML
4 INJECTION INTRAMUSCULAR; INTRAVENOUS
Status: DISCONTINUED | OUTPATIENT
Start: 2024-11-14 | End: 2024-11-14

## 2024-11-14 RX ORDER — GABAPENTIN 300 MG/1
600 CAPSULE ORAL AT BEDTIME
Status: DISCONTINUED | OUTPATIENT
Start: 2024-11-14 | End: 2024-11-19 | Stop reason: HOSPADM

## 2024-11-14 RX ORDER — OMEGA-3 FATTY ACIDS/FISH OIL 300-1000MG
1 CAPSULE ORAL DAILY
COMMUNITY

## 2024-11-14 RX ORDER — TRAZODONE HYDROCHLORIDE 50 MG/1
100 TABLET, FILM COATED ORAL AT BEDTIME
Status: DISCONTINUED | OUTPATIENT
Start: 2024-11-14 | End: 2024-11-19 | Stop reason: HOSPADM

## 2024-11-14 RX ORDER — GABAPENTIN 300 MG/1
600 CAPSULE ORAL AT BEDTIME
COMMUNITY

## 2024-11-14 RX ORDER — HYDROMORPHONE HYDROCHLORIDE 1 MG/ML
0.5 INJECTION, SOLUTION INTRAMUSCULAR; INTRAVENOUS; SUBCUTANEOUS ONCE
Status: DISCONTINUED | OUTPATIENT
Start: 2024-11-14 | End: 2024-11-14

## 2024-11-14 RX ORDER — CLOBETASOL PROPIONATE 0.5 MG/G
OINTMENT TOPICAL 2 TIMES DAILY PRN
COMMUNITY

## 2024-11-14 RX ORDER — ONDANSETRON 2 MG/ML
4 INJECTION INTRAMUSCULAR; INTRAVENOUS EVERY 6 HOURS PRN
Status: DISCONTINUED | OUTPATIENT
Start: 2024-11-14 | End: 2024-11-19 | Stop reason: HOSPADM

## 2024-11-14 RX ADMIN — HYDROMORPHONE HYDROCHLORIDE 0.5 MG: 1 INJECTION, SOLUTION INTRAMUSCULAR; INTRAVENOUS; SUBCUTANEOUS at 08:13

## 2024-11-14 RX ADMIN — PANTOPRAZOLE SODIUM 40 MG: 40 INJECTION, POWDER, FOR SOLUTION INTRAVENOUS at 08:13

## 2024-11-14 RX ADMIN — TRAZODONE HYDROCHLORIDE 100 MG: 50 TABLET ORAL at 22:49

## 2024-11-14 RX ADMIN — SODIUM CHLORIDE 60 ML: 9 INJECTION, SOLUTION INTRAVENOUS at 09:14

## 2024-11-14 RX ADMIN — SODIUM CHLORIDE, POTASSIUM CHLORIDE, SODIUM LACTATE AND CALCIUM CHLORIDE: 600; 310; 30; 20 INJECTION, SOLUTION INTRAVENOUS at 18:15

## 2024-11-14 RX ADMIN — ONDANSETRON 4 MG: 2 INJECTION, SOLUTION INTRAMUSCULAR; INTRAVENOUS at 08:13

## 2024-11-14 RX ADMIN — IOPAMIDOL 64 ML: 755 INJECTION, SOLUTION INTRAVENOUS at 09:14

## 2024-11-14 RX ADMIN — SODIUM CHLORIDE 1000 ML: 9 INJECTION, SOLUTION INTRAVENOUS at 10:41

## 2024-11-14 ASSESSMENT — ACTIVITIES OF DAILY LIVING (ADL)
ADLS_ACUITY_SCORE: 0

## 2024-11-14 NOTE — CONSULTS
General Surgery [facility] consultation/H&P    Mckayla Grady MRN#: [id number]   Age: 69 year old YOB: 1955     Date of Admission:          11/14/2024  Reason for consult/H&P: Possible bowel obstruction   Surgeon:      Magdiel Barton MD                  Chief Complaint:   Abdominal pain         History of Present Illness:   This patient is a 69 year old  female who presented to the [facility] ER with abdominal pain that was marked over the last several days and became worse last night.  It is a bit better this morning.  She did vomit some at home..  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  She says there is no sign of recurrent cancer and she has never had liver metastases.  She is followed at [facility].  In the last few months she had a bowel obstruction problem treated with 5 days of observation followed by abdominal exploration.  This was also done at [facility].  She denies any other trauma to the abdomen.  Right now she is not nauseated but she did vomit some this morning.  Denies fever, chills, change in BM or urination.    History is obtained from the patient and chart.         Past Medical History:    has a past medical history of Esophageal reflux, Hallux rigidus, Insomnia, unspecified, Numbness and tingling, PONV (postoperative nausea and vomiting), Primary adenocarcinoma of ampulla of Vater (H), and Seasonal allergies.          Past Surgical History:     Past Surgical History:   Procedure Laterality Date    ABDOMEN SURGERY      csection X3    ABDOMEN SURGERY  08/2019    whipple resection    APPENDECTOMY  1963    CHOLECYSTECTOMY  08/2019    included in whipple surgery    COLONOSCOPY  4/2009    COLONOSCOPY  4/2012    Repeat in 5 years    DISCECTOMY LUMBAR POSTERIOR MICROSCOPIC ONE LEVEL Right 1/8/2015    Procedure: DISCECTOMY LUMBAR POSTERIOR MICROSCOPIC ONE LEVEL;  Surgeon:  Román Smith MD;  Location:  OR    DISKECTOMY, LUMBAR, SINGLE SP  2001    X 2    DISKECTOMY, LUMBAR, SINGLE SP  2006     ENDOSCOPIC RETROGRADE CHOLANGIOPANCREATOGRAM N/A 7/20/2019    Procedure: ENDOSCOPIC RETROGRADE CHOLANGIOPANCREATPGRAPHY, SPINCTEROTOMY, BALLOON SWEEP, AMPILLARY BIOPSY, STENT PLACEMENT, SPHINCTEROTOMY;  Surgeon: Hansel Tatum MD;  Location:  OR    ENDOSCOPIC RETROGRADE CHOLANGIOPANCREATOGRAM N/A 7/24/2019    Procedure: ENDOSCOPIC RETROGRADE CHOLANGIOPANCREATOGRAPHY, COMMON BILE DUCT BRUSHINGS AND 10f/7CM STENT PLACEMENT X 2 STENTS, BIOPSIES OF AMPULA  MASS AND;  Surgeon: Michelle Leahy MD;  Location:  OR    ENDOSCOPIC ULTRASOUND UPPER GASTROINTESTINAL TRACT (GI) N/A 7/24/2019    Procedure: ENDOSCOPIC ULTRASOUND, ESOPHAGOSCOPY / UPPER GASTROINTESTINAL TRACT, STENT REMOVAL, AND FNA / BIOPIES OF AMPULA  MASS;  Surgeon: Michelle Leahy MD;  Location:  OR     REVISE MEDIAN N/CARPAL TUNNEL SURG  2002    HYSTERECTOMY, PAP NO LONGER INDICATED      INSERT PORT VASCULAR ACCESS N/A 10/14/2019    Procedure: PORT PLACEMENT, Left Subclavian;  Surgeon: Papa May MD;  Location:  OR    OPTICAL TRACKING SYSTEM FUSION SPINE POSTERIOR LUMBAR TWO LEVELS N/A 8/4/2016    Procedure: OPTICAL TRACKING SYSTEM FUSION SPINE POSTERIOR LUMBAR TWO LEVELS;  Surgeon: Román Smith MD;  Location:  OR    ORTHOPEDIC SURGERY  9/2015    GT TOE JOINT FUSION - BILATERAL    REMOVE PORT VASCULAR ACCESS N/A 8/28/2020    Procedure: PORT REMOVAL;  Surgeon: Papa May MD;  Location:  OR    SURGICAL HISTORY OF -   1976    laparoscopy benign enlarged lymph node    [facility] EXPLORE/TREAT ANKLE JOINT  1989    [facility] TOTAL ABDOM HYSTERECTOMY  2002    with bilat ovary removal            Medications:     Prior to Admission medications    Medication Sig Start Date End Date Taking? Authorizing Provider   albuterol (PROAIR HFA/PROVENTIL HFA/VENTOLIN HFA) 108 (90  Base) MCG/ACT inhaler Inhale 2 puffs into the lungs every 4 hours as needed for shortness of breath / dyspnea or wheezing 7/5/19  Yes Jennifer Vargas MD   estradiol (ESTRACE) 0.1 MG/GM vaginal cream Place vaginally twice a week. On Mondays and Fridays   Yes Unknown, Entered By History   Ferrous Sulfate 27 MG TABS Take 1 tablet by mouth daily.   Yes Unknown, Entered By History   gabapentin (NEURONTIN) 300 MG capsule Take 300 mg by mouth 2 times daily. Morning and midday   Yes Unknown, Entered By History   gabapentin (NEURONTIN) 300 MG capsule Take 600 mg by mouth at bedtime.   Yes Unknown, Entered By History   LORazepam (ATIVAN) 1 MG tablet Take 1 mg by mouth daily as needed.   Yes Unknown, Entered By History   multivitamin w/minerals (THERA-VIT-M) tablet Take 1 tablet by mouth daily   Yes Unknown, Entered By History   omega 3 1000 MG CAPS Take 1 capsule by mouth daily.   Yes Unknown, Entered By History   omeprazole (PRILOSEC) 20 MG DR capsule Take 20 mg by mouth daily as needed.   Yes Unknown, Entered By History   polyethylene glycol (MIRALAX) 17 g packet Take 1 packet by mouth daily as needed for constipation.   Yes Unknown, Entered By History   traZODone (DESYREL) 50 MG tablet Take 1-2 tablets ( mg) by mouth At Bedtime  Patient taking differently: Take 100 mg by mouth at bedtime. 11/20/20  Yes Gael Jacobson APRN CNP   vitamin D3 (CHOLECALCIFEROL) 125 MCG (5000 UT) tablet Take 5,000 Units by mouth daily.   Yes Unknown, Entered By History   clobetasol (TEMOVATE) 0.05 % external ointment Apply topically 2 times daily as needed.    Unknown, Entered By History            Allergies:     Allergies   Allergen Reactions    Ampicillin GI Disturbance            Social History:     Social History     Tobacco Use    Smoking status: Never    Smokeless tobacco: Never   Substance Use Topics    Alcohol use: Yes     Alcohol/week: 0.0 standard drinks of alcohol     Comment: 2 drinks/week             Family History:  "   This patient has no significant relevant family history.  Family history is reviewed in detail.          Review of Systems:   Complete ROS is negative other than noted in the HPI.  C: NEGATIVE for fever, chills, change in weight  R: NEGATIVE for significant cough or SOB  CV: NEGATIVE for chest pain, palpitations or peripheral edema  GI:  NEGATIVE for dysuria, heartburn, or change in bowel habits  H: NEGATIVE for bleeding problems         Physical Exam:   Blood pressure 133/76, pulse 75, temperature 97.6  F (36.4  C), temperature source Temporal, resp. rate 16, height 1.575 m (5' 2\"), weight 58.1 kg (128 lb), last menstrual period 05/21/2002, SpO2 99%, not currently breastfeeding.  No intake/output data recorded.    General - This is a well developed, well nourished female .  HEENT - Normocephalic. Atraumatic. Moist mucous membranes. Pupils equal.  No scleral icterus. Nose normal.  Neck - Supple without masses. No cervical adenopathy or thyromegaly  Lungs - Breathing not labored  Chest - Not tender. CVA's nontender  Heart - Regular rate & rhythm   Abdomen - Soft, mild tenderness in the right abdomen but not really marked.  No masses palpable.  no organomegaly.  Extremities - Moves all extremities. Warm without edema. Pulses noted  Neurologic - Nonfocal. Alert and oriented          Data:   Labs:  Recent Labs   Lab Test 11/14/24  0812 08/25/20  0843 09/12/19  1040 08/26/19  1309   WBC 13.6*  --  12.4* 11.6*   HGB 11.2* 12.4 10.7* 9.5*   HCT 32.3*  --  32.2* 29.2*   * 216 264 607*     Recent Labs   Lab Test 11/14/24  0812 10/14/24  1412 09/28/23  1005 08/25/20  0843 08/18/19  0000 07/21/19  0701 07/20/19  0645   POTASSIUM 3.9  --   --   --  3.8 3.7 3.7   CHLORIDE 103  --   --   --   --  107 113*   CO2 25  --   --   --   --  28 26   BUN 13.7  --   --   --   --  9 12   CR 0.53 0.7 0.7   < > 0.54* 0.68 0.67    < > = values in this interval not displayed.     Recent Labs   Lab Test 11/14/24  0812 08/26/19  1309 " 08/18/19  0000 08/08/19  0906 07/24/19  1301 07/21/19  0701 07/20/19  0645 07/19/19  1649   BILITOTAL 0.4 1.3  --  4.9*   < > 6.9*   < > 6.2*   ALT 12 47 64* 411*   < > 45   < > 64*   AST 15 43 51* 227*   < > 34   < > 37   ALKPHOS 143 225*  --  296*   < > 356*   < > 382*   LIPASE 41  --   --   --   --  193  --  506*    < > = values in this interval not displayed.     Recent Labs   Lab Test 07/20/19  0839   INR 0.96     Recent Labs   Lab Test 11/14/24  0812 04/08/22  1450 07/21/19  0701   CARMEN 9.2 8.7 8.8     Recent Labs   Lab Test 11/14/24  0812 09/12/19  1040 08/26/19  1309 08/08/19  0906 07/24/19  1301 07/21/19  0701 07/20/19  0645 07/19/19  1649 07/10/19  0831   ANIONGAP 11  --   --   --   --  7 4   < >  --    PROTEIN  --  100*  --   --   --   --   --   --  30*   ALBUMIN 3.8  --  3.5 3.3*   < > 2.9* 2.9*   < >  --     < > = values in this interval not displayed.       CT scan of the abdomen: Images reviewed, some dilated small bowel with a transition point.  Fair amount of stool in the colon.  Large stomach.  No free air.    Ultrasound of the abdomen: Not done               Assessment:   Partial bowel obstruction in patient with a postoperative history that is quite complex.  No signs of sepsis or volvulus now so we would not proceed with the operation today.  Discussed the plan for walking, limitation in oral intake, conservative treatment for now.         Plan:   No operative intervention today.  We will follow the patient carefully       I have discussed the history, physical, and plan with the patient.   Magdiel Barton M.D.

## 2024-11-14 NOTE — ED PROVIDER NOTES
Emergency Department Note      History of Present Illness     Chief Complaint  Abdominal Pain    HPI  Mckayla Grady is a 69 year old female with history of Ampullary carcinoma and GERD who presents to the ED with her  for evaluation of abdominal pain. She reports upper abdominal pain radiating to the back, nausea and vomiting for the past four days which has been progressively worsening. She vomited this morning but no blood in vomit. Last bowel movement was last night and no black stools. She took an Prilosec this morning but vomited shortly after along with Miralax. No use of blood thinners. Denies chest pain, shortness of breath, or fever. Patient was recently hospitalized for small bowel obstruction and was discharged feeling fine. No alcohol use.  Reports that her primary care provider has been keeping track of her white count which has been climbing.    Independent Historian  None    Review of External Notes  I reviewed the Admission note from 10/22/24 when she was admitted at [facility] for SBO had diagnostic laparoscopy and small bowel resection  Past Medical History   Medical History and Problem List   Esophageal reflux  Hallux rigidus  Insomnia  PONV   Primary adenocarcinoma of ampulla of Vater  Seasonal allergies  Anxiety  SBO  Left breast bass  Central spinal stenosis  Hypertension  Acute blood loss anemia from surgery  GERD  Ampullary carcinoma     Medications   Meclizine  Zyrtec  Albuterol inhaler  Gabapentin  Trazodone  Prilosec   Ativan    Surgical History    x3  Whipple resection  Appendectomy  Cholecystectomy  Colonoscopy  Discectomy lumbar posterior microscopic one level - right  Diskectomy, lumbar, single SP x2  Endoscopic retrograde cholangiopancreatogram  Optical tracking system fusion spine posterior lumbar one levels  Carpal tunnel surgery  Toe joint fusion - bilateral  Remove post vascular access  Laparoscopy benign enlarged lymph node  Total hysterectomy with bilateral ovary  "removal  Physical Exam   Patient Vitals for the past 24 hrs:   BP Temp Temp src Pulse Resp SpO2 Height Weight   11/14/24 0736 -- 97.6  F (36.4  C) Temporal -- -- -- -- --   11/14/24 0735 133/76 -- -- 75 16 99 % 1.575 m (5' 2\") 58.1 kg (128 lb)     Physical Exam  General: Overall stable and nontoxic appearing  HEENT: Conjunctivae clear, no scleral icterus, mucous membranes moist  Neuro: Alert, moving all extremities equally with intention  CV: Regular rate and rhythm, radial and DP pulses equal  Respiratory: No signs of respiratory distress, lungs clear to auscultation bilaterally   Abdomen: Soft, without rigidity or rebound throughout   Epigastric tenderness    No focal RUQ tenderness   No CVA tenderness bilaterally  MSK: No lower extremity swelling or tenderness    Diagnostics   Lab Results   Labs Ordered and Resulted from Time of ED Arrival to Time of ED Departure   COMPREHENSIVE METABOLIC PANEL - Abnormal       Result Value    Sodium 139      Potassium 3.9      Carbon Dioxide (CO2) 25      Anion Gap 11      Urea Nitrogen 13.7      Creatinine 0.53      GFR Estimate >90      Calcium 9.2      Chloride 103      Glucose 127 (*)     Alkaline Phosphatase 143      AST 15      ALT 12      Protein Total 7.4      Albumin 3.8      Bilirubin Total 0.4     CBC WITH PLATELETS AND DIFFERENTIAL - Abnormal    WBC Count 13.6 (*)     RBC Count 3.37 (*)     Hemoglobin 11.2 (*)     Hematocrit 32.3 (*)     MCV 96      MCH 33.2 (*)     MCHC 34.7      RDW 11.9      Platelet Count 533 (*)     % Neutrophils 78      % Lymphocytes 12      % Monocytes 9      % Eosinophils 1      % Basophils 0      % Immature Granulocytes 1      NRBCs per 100 WBC 0      Absolute Neutrophils 10.6 (*)     Absolute Lymphocytes 1.7      Absolute Monocytes 1.2      Absolute Eosinophils 0.1      Absolute Basophils 0.1      Absolute Immature Granulocytes 0.1      Absolute NRBCs 0.0     LIPASE - Normal    Lipase 41       Imaging  CT Abdomen Pelvis w Contrast   Final " Result   IMPRESSION:    1.  Focally dilated anterior lower abdominal small bowel loop with   apparent transition point in the mid abdomen, where there is tethering   of bowel loops, slight mesenteric swirling and mesenteric congestion   concerning for obstruction, possibly from adhesion rather than   internal hernia. There is surrounding inflammation and trace interloop   fluid. No pneumatosis or free air. Recommend surgical consultation.   2.  Pelvic mesenteric fluid collection measuring 3.2 x 4.3 cm with   peripheral enhancement. Reportedly, patient had bowel surgery 10/2024.   Differential could reflect postoperative fluid collection versus   developing abscess.       ANTHONY LITTLEJOHN MD            SYSTEM ID:  [id number]        Report per radiology    Independent Interpretation  None  ED Course    Medications Administered  Medications   HYDROmorphone (PF) (DILAUDID) injection 0.5 mg (has no administration in time range)   ondansetron (ZOFRAN) injection 4 mg (has no administration in time range)   pantoprazole (PROTONIX) IV push injection 40 mg (40 mg Intravenous $Given 11/14/24 0813)   HYDROmorphone (PF) (DILAUDID) injection 0.5 mg (0.5 mg Intravenous $Given 11/14/24 0813)   ondansetron (ZOFRAN) injection 4 mg (4 mg Intravenous $Given 11/14/24 0813)   iopamidol (ISOVUE-370) solution 64 mL (64 mLs Intravenous $Given 11/14/24 0914)   Saline Flush (60 mLs Intravenous $Given 11/14/24 0914)   sodium chloride 0.9% BOLUS 1,000 mL (1,000 mLs Intravenous $New Bag 11/14/24 1041)     Procedures  Procedures     Discussion of Management  Admitting Hospitalist, Dr. Steward  Surgery, Dr. Magdiel Barton    Additional Documentation  None    ED Course  ED Course as of 11/14/24 1322   Thu Nov 14, 2024   0750 I obtained history and examined the patient as noted above.    1015 I rechecked the patient and explained findings.    1214 I rechecked the patient and explained findings.    1312 I spoke with Dr. Magdiel Barton with General Surgery  about the patient's history and plan of care.     1322 I spoke with Dr. Stewadr, hospitalist, who agreed to admit the patient.      Medical Decision Making / Diagnosis   CMS Diagnoses: None    MIPS     None    MDM  69-year-old female with a history of SBO in October and remote ampullary cancer Dane as/P. Whipple who presents to the emergency department with a chief complaint of abdominal pain and episode of vomiting.  She is overall stable and nontoxic and no peritoneal signs on abdominal exam.  Pain improved after receiving Dilaudid.  CT abdomen pelvis demonstrates findings likely concerning for another small bowel obstruction as well as a fluid collection in the pelvic area concerning for possible abscess.  At this time she is no signs of systemic toxicity.  Seen by surgery, Dr. Segovia who recommends conservative treatment at this time.  I spoke with Dr. Steward, hospitalist who kindly accepts for admission for further management.     Disposition  The patient was admitted to the hospital.     ICD-10 Codes:    ICD-10-CM    1. Small bowel obstruction (H)  K56.609       2. Pelvic fluid collection  R18.8       3. Leukocytosis, unspecified type  D72.829            Scribe Disclosure:  Valarie SHEIKH, am serving as a scribe at 7:50 AM on 11/14/2024 to document services personally performed by Ana Elizabeth MD based on my observations and the provider's statements to me.      Ana Elizabeth MD  11/14/24 0760

## 2024-11-14 NOTE — PHARMACY-ADMISSION MEDICATION HISTORY
Pharmacist Admission Medication History    Admission medication history is complete. The information provided in this note is only as accurate as the sources available at the time of the update.    Information Source(s): Patient, Hospital records, and CareEverywhere/SureScripts via in-person    Pertinent Information:     Changes made to PTA medication list:  Added: estradiol, omeprazole, clobetasol, Miralax, ferrous sulfate, Omega-3, lorazepam (no fills in last year)  Deleted: Zyrtec, Norco, meclizine, Creon  Changed: gabapentin (600 mg at bedtime to 300 mg AM, 300 mg midday and 600 mg at bedtime)     Allergies reviewed with patient and updates made in EHR: no    Medication History Completed By: SANJAY LOVE RPH 11/14/2024 11:24 AM    PTA Med List   Medication Sig Last Dose/Taking    albuterol (PROAIR HFA/PROVENTIL HFA/VENTOLIN HFA) 108 (90 Base) MCG/ACT inhaler Inhale 2 puffs into the lungs every 4 hours as needed for shortness of breath / dyspnea or wheezing Unknown    estradiol (ESTRACE) 0.1 MG/GM vaginal cream Place vaginally twice a week. On Mondays and Fridays Past Week    Ferrous Sulfate 27 MG TABS Take 1 tablet by mouth daily. Past Week    gabapentin (NEURONTIN) 300 MG capsule Take 300 mg by mouth 2 times daily. Morning and midday Past Week    gabapentin (NEURONTIN) 300 MG capsule Take 600 mg by mouth at bedtime. Past Week    LORazepam (ATIVAN) 1 MG tablet Take 1 mg by mouth daily as needed. More than a month    multivitamin w/minerals (THERA-VIT-M) tablet Take 1 tablet by mouth daily Past Week    omega 3 1000 MG CAPS Take 1 capsule by mouth daily. Past Week    omeprazole (PRILOSEC) 20 MG DR capsule Take 20 mg by mouth daily as needed. Past Week    polyethylene glycol (MIRALAX) 17 g packet Take 1 packet by mouth daily as needed for constipation. Past Week    traZODone (DESYREL) 50 MG tablet Take 1-2 tablets ( mg) by mouth At Bedtime (Patient taking differently: Take 100 mg by mouth at bedtime.)  Past Week    vitamin D3 (CHOLECALCIFEROL) 125 MCG (5000 UT) tablet Take 5,000 Units by mouth daily. Past Week

## 2024-11-14 NOTE — ED NOTES
"[facility]  ED Nurse Handoff Report    ED Chief complaint: Abdominal Pain      ED Diagnosis:   Final diagnoses:   None       Code Status: Full Code    Allergies:   Allergies   Allergen Reactions    Ampicillin GI Disturbance       Patient Story: C/o of abdominal pain with nausea and vomiting. Pt was hospitalized for small bowel obstruction and discharged from [facility] on November.   Focused Assessment:  Abdominal pain.     Treatments and/or interventions provided: NS, Dilaudid, zofran.  Patient's response to treatments and/or interventions: Decreased pain.     To be done/followed up on inpatient unit:  See orders.     Does this patient have any cognitive concerns?:  None    Activity level - Baseline/Home:  Independent  Activity Level - Current:   Independent    Patient's Preferred language: English   Needed?: No    Isolation: None  Infection: Not Applicable  Patient tested for COVID 19 prior to admission: NO  Bariatric?: No    Vital Signs:   Vitals:    11/14/24 0735 11/14/24 0736   BP: 133/76    Pulse: 75    Resp: 16    Temp:  97.6  F (36.4  C)   TempSrc:  Temporal   SpO2: 99%    Weight: 58.1 kg (128 lb)    Height: 1.575 m (5' 2\")        For the majority of the shift this patient's behavior was Green.   Behavioral interventions performed were None needed.    ED NURSE PHONE NUMBER: [phone]         "

## 2024-11-14 NOTE — PROGRESS NOTES
RECEIVING UNIT ED HANDOFF REVIEW    ED Nurse Handoff Report was reviewed by: Fide Soares RN on November 14, 2024 at 5:32 PM

## 2024-11-14 NOTE — ED TRIAGE NOTES
C/o of abdominal pain with nausea and vomiting. Pt was hospitalized for small bowel obstruction and discharged from [facility] on November.

## 2024-11-14 NOTE — H&P
[facility]    History and Physical - Hospitalist Service       Date of Admission:  11/14/2024    Assessment & Plan      Mckayla Grady is a 69 year old female with history of recent small bowel obstruction with admission to [facility] 10/22-11/5, GERD, ampullary carcinoma, status post Whipple 2019, spinal stenosis, insomnia, anxiety,  and GERD among others who presented to the ED with 3 days of generalized abdominal pain.  Pain has been has been 9 out of 10 and some associated nausea and vomiting noted earlier this morning.  She denies fevers or chills.  Has not had bowel movement in at least 24 hours and does not recall last time she passed gas.  Some difficulty urinating but otherwise no issues.  She denies shortness of breath, cough, chest pain, or palpitations.  Patient follows with [place] [facility] for surveillance with recent imaging showing possible 3.4 cm masslike iwflib-fjttod-ax abdominal MRI showed no evidence of malignancy.  During recent admission at [facility], general surgery was following and patient was trialed with conservative management with NG-after 5 days without improvement she had diagnostic laparoscopy, EKATERINA, small bowel resection due to stricture on 10/27. She was on TPN and passed NG clamp with removal on 11/3. She was discharged on 11/5 with 2 weeks of miralax prn and low fiber diet. CT scan concerning for partial SBO, general surgery has elevated in ED and she is appropriate for conservative management for now.      Partial small bowel obstruction  Likely postop pelvic fluid collection  Hx of SBO - s/p lap and EKATERINA, resection at [facility] - hospitalized 10/22-11/5   Hx of Ampullar cancer s/p Whipple 2019  Recent hospitalization as above. Follows with [place] [facility] for surveillance. Presented to [facility] ED with 3 days generalized abdominal pain; nausea and vomited once this morning but improved since treatment in ED. BOWEL SOUNDS HYPOACTIVE IN ED.  CT showing probable partial SBO  and likely pelvic fluid collection from postoperative change. Surgery evaluated in ED, no plan for OR or NG currently. Mild leukocytosis of 13.6 noted.  - Admit to inpatient  - General Surgery consultation  - N.p.o.-sips and chips are okay, could consider advancing a.m. 11/15  - As needed analgesia and antiemetics  - Ambulate is much as tolerated  - PPI IV for now    GERD  - IV PPI for now    Insomnia  - PTA trazodone    Anxiety  Stable  - PTA ativan prn          Diet:  npo - sips/chips ok if tolerated  DVT Prophylaxis: Pneumatic Compression Devices and Ambulate every shift  Negro Catheter: Not present  Lines: None     Cardiac Monitoring: None  Code Status:  Full Code - discussed and confirmed upon admission    Clinically Significant Risk Factors Present on Admission                                        Disposition Plan     Medically Ready for Discharge: Anticipated in 2-4 Days           Guero Steward MD  Hospitalist Service  [facility]  Securely message with [employer] (more info)  Text page via [employer] Paging/Directory     ______________________________________________________________________    Chief Complaint   Abdominal pain    History is obtained from the patient    History of Present Illness   Mckayla Grady is a 69 year old female with history of recent small bowel obstruction with admission to [facility] 10/22-11/5, GERD, ampullary carcinoma, status post Whipple 2019, hypertension, spinal stenosis, insomnia, anxiety,  and GERD among others who presented to the ED with 3 days of generalized abdominal pain.  Pain has been has been 9 out of 10 and some associated nausea and vomiting noted earlier this morning.  She denies fevers or chills.  Has not had bowel movement in at least 24 hours and does not recall last time she passed gas.  Some difficulty urinating but otherwise no issues.  She denies shortness of breath, cough, chest pain, or palpitations.  Patient follows with [place]  [facility] for surveillance with recent imaging showing possible 3.4 cm masslike hogskb-alfmwt-sw abdominal MRI showed no evidence of malignancy.  During recent admission at [facility], general surgery was following and patient was trialed with conservative management with NG-after 5 days without improvement she had diagnostic laparoscopy, EKATERINA, small bowel resection due to stricture on 10/27. She was on TPN and passed NG clamp with removal on 11/3. She was discharged on 11/5 with 2 weeks of miralax prn and low fiber diet. CT scan concerning for partial SBO, general surgery has elevated in ED and she is appropriate for conservative management for now.      Past Medical History    Past Medical History:   Diagnosis Date    Esophageal reflux     Hallux rigidus     bilatateral    Insomnia, unspecified     Numbness and tingling     localized to LLE; > TOP OF FOOT & CALF AREAS    PONV (postoperative nausea and vomiting)     Primary adenocarcinoma of ampulla of Vater (H)     Seasonal allergies        Past Surgical History   Past Surgical History:   Procedure Laterality Date    ABDOMEN SURGERY      csection X3    ABDOMEN SURGERY  08/2019    whipple resection    APPENDECTOMY  1963    CHOLECYSTECTOMY  08/2019    included in whipple surgery    COLONOSCOPY  4/2009    COLONOSCOPY  4/2012    Repeat in 5 years    DISCECTOMY LUMBAR POSTERIOR MICROSCOPIC ONE LEVEL Right 1/8/2015    Procedure: DISCECTOMY LUMBAR POSTERIOR MICROSCOPIC ONE LEVEL;  Surgeon: Román Smith MD;  Location:  OR    DISKECTOMY, LUMBAR, SINGLE SP  2001    X 2    DISKECTOMY, LUMBAR, SINGLE SP  2006     ENDOSCOPIC RETROGRADE CHOLANGIOPANCREATOGRAM N/A 7/20/2019    Procedure: ENDOSCOPIC RETROGRADE CHOLANGIOPANCREATPGRAPHY, SPINCTEROTOMY, BALLOON SWEEP, AMPILLARY BIOPSY, STENT PLACEMENT, SPHINCTEROTOMY;  Surgeon: Hansel Tatum MD;  Location:  OR    ENDOSCOPIC RETROGRADE CHOLANGIOPANCREATOGRAM N/A 7/24/2019    Procedure: ENDOSCOPIC RETROGRADE  CHOLANGIOPANCREATOGRAPHY, COMMON BILE DUCT BRUSHINGS AND 10f/7CM STENT PLACEMENT X 2 STENTS, BIOPSIES OF AMPULA  MASS AND;  Surgeon: Michelle Leahy MD;  Location:  OR    ENDOSCOPIC ULTRASOUND UPPER GASTROINTESTINAL TRACT (GI) N/A 7/24/2019    Procedure: ENDOSCOPIC ULTRASOUND, ESOPHAGOSCOPY / UPPER GASTROINTESTINAL TRACT, STENT REMOVAL, AND FNA / BIOPIES OF AMPULA  MASS;  Surgeon: Michelle Leahy MD;  Location:  OR    HC REVISE MEDIAN N/CARPAL TUNNEL SURG  2002    HYSTERECTOMY, PAP NO LONGER INDICATED      INSERT PORT VASCULAR ACCESS N/A 10/14/2019    Procedure: PORT PLACEMENT, Left Subclavian;  Surgeon: Papa May MD;  Location:  OR    OPTICAL TRACKING SYSTEM FUSION SPINE POSTERIOR LUMBAR TWO LEVELS N/A 8/4/2016    Procedure: OPTICAL TRACKING SYSTEM FUSION SPINE POSTERIOR LUMBAR TWO LEVELS;  Surgeon: Román Smith MD;  Location:  OR    ORTHOPEDIC SURGERY  9/2015    GT TOE JOINT FUSION - BILATERAL    REMOVE PORT VASCULAR ACCESS N/A 8/28/2020    Procedure: PORT REMOVAL;  Surgeon: Papa May MD;  Location:  OR    SURGICAL HISTORY OF -   1976    laparoscopy benign enlarged lymph node    [facility] EXPLORE/TREAT ANKLE JOINT  1989    [facility] TOTAL ABDOM HYSTERECTOMY  2002    with bilat ovary removal       Prior to Admission Medications   Prior to Admission Medications   Prescriptions Last Dose Informant Patient Reported? Taking?   Ferrous Sulfate 27 MG TABS Past Week  Yes Yes   Sig: Take 1 tablet by mouth daily.   LORazepam (ATIVAN) 1 MG tablet More than a month  Yes Yes   Sig: Take 1 mg by mouth daily as needed.   albuterol (PROAIR HFA/PROVENTIL HFA/VENTOLIN HFA) 108 (90 Base) MCG/ACT inhaler Unknown Self No Yes   Sig: Inhale 2 puffs into the lungs every 4 hours as needed for shortness of breath / dyspnea or wheezing   clobetasol (TEMOVATE) 0.05 % external ointment Unknown  Yes No   Sig: Apply topically 2 times daily as needed.   estradiol (ESTRACE) 0.1 MG/GM  vaginal cream Past Week  Yes Yes   Sig: Place vaginally twice a week. On Mondays and Fridays   gabapentin (NEURONTIN) 300 MG capsule Past Week  Yes Yes   Sig: Take 300 mg by mouth 2 times daily. Morning and midday   gabapentin (NEURONTIN) 300 MG capsule Past Week  Yes Yes   Sig: Take 600 mg by mouth at bedtime.   multivitamin w/minerals (THERA-VIT-M) tablet Past Week Self Yes Yes   Sig: Take 1 tablet by mouth daily   omega 3 1000 MG CAPS Past Week  Yes Yes   Sig: Take 1 capsule by mouth daily.   omeprazole (PRILOSEC) 20 MG DR capsule Past Week  Yes Yes   Sig: Take 20 mg by mouth daily as needed.   polyethylene glycol (MIRALAX) 17 g packet Past Week  Yes Yes   Sig: Take 1 packet by mouth daily as needed for constipation.   traZODone (DESYREL) 50 MG tablet Past Week  No Yes   Sig: Take 1-2 tablets ( mg) by mouth At Bedtime   Patient taking differently: Take 100 mg by mouth at bedtime.   vitamin D3 (CHOLECALCIFEROL) 125 MCG (5000 UT) tablet Past Week  Yes Yes   Sig: Take 5,000 Units by mouth daily.      Facility-Administered Medications: None        Review of Systems    The 10 point Review of Systems is negative other than noted in the HPI or here.     Social History   I have reviewed this patient's social history and updated it with pertinent information if needed.  Social History     Tobacco Use    Smoking status: Never    Smokeless tobacco: Never   Substance Use Topics    Alcohol use: Yes     Alcohol/week: 0.0 standard drinks of alcohol     Comment: 2 drinks/week    Drug use: No        Physical Exam   Vital Signs: Temp: 97.6  F (36.4  C) Temp src: Temporal BP: 133/76 Pulse: 75   Resp: 16 SpO2: 99 % O2 Device: None (Room air)    Weight: 128 lbs 0 oz    Gen: NAD, pleasant, spouse Austen at bedside  HEENT: EOMI, MMM  Resp: no focal crackles,  no wheezes, no increased work of resp  CV: S1S2 heard, reg rhythm, reg rate  Abdo: soft, nontender to mild palpation, no rebound, nondistended, hypoactive bowel  sounds  Ext: calves nontender, well perfused  Neuro: aa, conversant, moving ext, CN grossly intact, no facial asymmetry      Medical Decision Making       76 MINUTES SPENT BY ME on the date of service doing chart review, history, exam, documentation & further activities per the note.      Data     I have personally reviewed the following data over the past 24 hrs:    13.6 (H)  \   11.2 (L)   / 533 (H)     139 103 13.7 /  127 (H)   3.9 25 0.53 \     ALT: 12 AST: 15 AP: 143 TBILI: 0.4   ALB: 3.8 TOT PROTEIN: 7.4 LIPASE: 41       Imaging results reviewed over the past 24 hrs:   Recent Results (from the past 24 hours)   CT Abdomen Pelvis w Contrast    Narrative    CT ABDOMEN PELVIS W CONTRAST 11/14/2024 9:25 AM    CLINICAL HISTORY: Abdominal pain, epigastric, nausea and vomiting,  history of SBO in October, please evaluate for recurrence.    TECHNIQUE: CT scan of the abdomen and pelvis was performed following  injection of IV contrast. Multiplanar reformats were obtained. Dose  reduction techniques were used.    CONTRAST: 64mL Isovue 370    COMPARISON: MR abdomen 10/21/2024. CT chest, abdomen and pelvis  10/14/2024.    FINDINGS:   LOWER CHEST: Unremarkable.    HEPATOBILIARY: No new suspicious lesion. Similar geographic  hypoenhancement within the anterior right hepatic lobe (4/48), which  demonstrated delayed postcontrast enhancement on the prior MRI and  could reflect sequelae of scar given the mild capsular retraction in  this region. Cholecystectomy and similar mild biliary ductal  dilatation.    PANCREAS: Whipple procedure. Residual pancreas appears unremarkable.    SPLEEN: Unremarkable.    ADRENAL GLANDS: No significant nodules.    KIDNEYS: No significant mass, stones, or hydronephrosis.    BOWEL: There appears to be tethering of bowel within the mid lower  abdomen (5/23), dilated distal ileum measuring up to 3.1 cm (4/107)  and adjacent transition point. There is surrounding moderate  inflammation, slight twisting  of the mesentery and mesenteric  congestion. Lower pelvic mesenteric collection of fluid with  peripheral enhancement measuring 3.2 x 4.3 cm (4/115). Trace interloop  and pelvic fluid. No pneumatosis or free air.    VASCULATURE: Nonaneurysmal abdominal aorta.    LYMPH NODE AND PERITONEUM: No enlarged lymph node.    PELVIS: No pelvic mass.    MUSCULOSKELETAL: Lumbar spine fusion hardware. No aggressive osseous  lesion. Degenerative changes of the spine. Trace left abdominal  extraperitoneal gas is likely postsurgical.    OTHER: None.      Impression    IMPRESSION:   1.  Focally dilated anterior lower abdominal small bowel loop with  apparent transition point in the mid abdomen, where there is tethering  of bowel loops, slight mesenteric swirling and mesenteric congestion  concerning for obstruction, possibly from adhesion rather than  internal hernia. There is surrounding inflammation and trace interloop  fluid. No pneumatosis or free air. Recommend surgical consultation.  2.  Pelvic mesenteric fluid collection measuring 3.2 x 4.3 cm with  peripheral enhancement. Reportedly, patient had bowel surgery 10/2024.  Differential could reflect postoperative fluid collection versus  developing abscess.     ANTHONY LITTLEJOHN MD         SYSTEM ID:  [id number]

## 2024-11-14 NOTE — TELEPHONE ENCOUNTER
Nurse Triage SBAR    Is this a 2nd Level Triage? NO    Situation:   upper abdominal pain that goes for approx last 4 days  Getting progressively worse everyday  8/10 constant  Only slept a few hours and woke up in a sweat with increased pain that is now also going into mid back area  'Even hurts when I breath'    Background:  Discharged from [facility] on 11/5 after small bowel resection  Pt was seen by PCP on 11/12/24  Reports WBC was still somewhat high -scheduled for repeat lab in clinic and PCP visit on 11/18  Hx of whipple procedure    Assessment:   Denies;  Fever  Difficulty breathing/SOB  Too weak to stand  Chest pain/pressure    Protocol Recommended Disposition:   Go to ED now  Pt verbalized understanding and agrees with this plan  States she will go to [facility] ED now    Does the patient meet one of the following criteria for ADS visit consideration? No  Doretha Palencia RN, Nurse Advisor 6:49 AM 11/14/2024  Reason for Disposition   [1] Pain lasts > 10 minutes AND [2] age > 50    Additional Information   Negative: SEVERE difficulty breathing (e.g., struggling for each breath, speaks in single words)   Negative: Shock suspected (e.g., cold/pale/clammy skin, too weak to stand, low BP, rapid pulse)   Negative: Difficult to awaken or acting confused (e.g., disoriented, slurred speech)   Negative: Passed out (i.e., lost consciousness, collapsed and was not responding)   Negative: Visible sweat on face or sweat dripping down face   Negative: Sounds like a life-threatening emergency to the triager   Negative: Followed an abdomen (stomach) injury   Negative: Chest pain   Negative: [1] Abdominal pain AND [2] pregnant < 20 weeks   Negative: [1] Abdominal pain AND [2] pregnant 20 or more weeks   Negative: [1] Abdominal pain AND [2] postpartum (from 0 to 6 weeks after delivery)   Negative: Abdomen bloating or swelling are main symptoms   Negative: [1] SEVERE pain (e.g., excruciating) AND [2] present > 1 hour    Protocols  used: Abdominal Pain - Upper-A-AH

## 2024-11-15 LAB
ANION GAP SERPL CALCULATED.3IONS-SCNC: 9 MMOL/L (ref 7–15)
BUN SERPL-MCNC: 10.9 MG/DL (ref 8–23)
CALCIUM SERPL-MCNC: 8.3 MG/DL (ref 8.8–10.4)
CHLORIDE SERPL-SCNC: 106 MMOL/L (ref 98–107)
CREAT SERPL-MCNC: 0.49 MG/DL (ref 0.51–0.95)
EGFRCR SERPLBLD CKD-EPI 2021: >90 ML/MIN/1.73M2
ERYTHROCYTE [DISTWIDTH] IN BLOOD BY AUTOMATED COUNT: 11.9 % (ref 10–15)
GLUCOSE SERPL-MCNC: 75 MG/DL (ref 70–99)
HCO3 SERPL-SCNC: 24 MMOL/L (ref 22–29)
HCT VFR BLD AUTO: 28.8 % (ref 35–47)
HGB BLD-MCNC: 9.4 G/DL (ref 11.7–15.7)
MCH RBC QN AUTO: 32.2 PG (ref 26.5–33)
MCHC RBC AUTO-ENTMCNC: 32.6 G/DL (ref 31.5–36.5)
MCV RBC AUTO: 99 FL (ref 78–100)
PLATELET # BLD AUTO: 391 10E3/UL (ref 150–450)
POTASSIUM SERPL-SCNC: 3.7 MMOL/L (ref 3.4–5.3)
RBC # BLD AUTO: 2.92 10E6/UL (ref 3.8–5.2)
SODIUM SERPL-SCNC: 139 MMOL/L (ref 135–145)
WBC # BLD AUTO: 10.7 10E3/UL (ref 4–11)

## 2024-11-15 PROCEDURE — 250N000013 HC RX MED GY IP 250 OP 250 PS 637: Performed by: PHYSICIAN ASSISTANT

## 2024-11-15 PROCEDURE — 82374 ASSAY BLOOD CARBON DIOXIDE: CPT | Performed by: HOSPITALIST

## 2024-11-15 PROCEDURE — 258N000003 HC RX IP 258 OP 636: Performed by: HOSPITALIST

## 2024-11-15 PROCEDURE — 99232 SBSQ HOSP IP/OBS MODERATE 35: CPT | Performed by: INTERNAL MEDICINE

## 2024-11-15 PROCEDURE — 99231 SBSQ HOSP IP/OBS SF/LOW 25: CPT | Mod: FS | Performed by: SURGERY

## 2024-11-15 PROCEDURE — 36415 COLL VENOUS BLD VENIPUNCTURE: CPT | Performed by: HOSPITALIST

## 2024-11-15 PROCEDURE — 258N000003 HC RX IP 258 OP 636: Performed by: PHYSICIAN ASSISTANT

## 2024-11-15 PROCEDURE — 250N000009 HC RX 250: Performed by: HOSPITALIST

## 2024-11-15 PROCEDURE — 85018 HEMOGLOBIN: CPT | Performed by: HOSPITALIST

## 2024-11-15 PROCEDURE — 80048 BASIC METABOLIC PNL TOTAL CA: CPT | Performed by: HOSPITALIST

## 2024-11-15 PROCEDURE — 250N000013 HC RX MED GY IP 250 OP 250 PS 637: Performed by: HOSPITALIST

## 2024-11-15 PROCEDURE — 120N000001 HC R&B MED SURG/OB

## 2024-11-15 PROCEDURE — 99207 PR APP CREDIT; MD BILLING SHARED VISIT: CPT | Performed by: PHYSICIAN ASSISTANT

## 2024-11-15 PROCEDURE — 82310 ASSAY OF CALCIUM: CPT | Performed by: HOSPITALIST

## 2024-11-15 RX ORDER — SODIUM CHLORIDE, SODIUM LACTATE, POTASSIUM CHLORIDE, CALCIUM CHLORIDE 600; 310; 30; 20 MG/100ML; MG/100ML; MG/100ML; MG/100ML
INJECTION, SOLUTION INTRAVENOUS CONTINUOUS
Status: ACTIVE | OUTPATIENT
Start: 2024-11-15 | End: 2024-11-16

## 2024-11-15 RX ORDER — BISACODYL 10 MG
10 SUPPOSITORY, RECTAL RECTAL ONCE
Status: COMPLETED | OUTPATIENT
Start: 2024-11-15 | End: 2024-11-15

## 2024-11-15 RX ADMIN — ACETAMINOPHEN 650 MG: 325 TABLET, FILM COATED ORAL at 00:11

## 2024-11-15 RX ADMIN — SODIUM CHLORIDE, POTASSIUM CHLORIDE, SODIUM LACTATE AND CALCIUM CHLORIDE: 600; 310; 30; 20 INJECTION, SOLUTION INTRAVENOUS at 06:17

## 2024-11-15 RX ADMIN — ACETAMINOPHEN 650 MG: 325 TABLET, FILM COATED ORAL at 21:56

## 2024-11-15 RX ADMIN — PANTOPRAZOLE SODIUM 40 MG: 40 INJECTION, POWDER, FOR SOLUTION INTRAVENOUS at 08:35

## 2024-11-15 RX ADMIN — TRAZODONE HYDROCHLORIDE 100 MG: 50 TABLET ORAL at 21:56

## 2024-11-15 RX ADMIN — ACETAMINOPHEN 650 MG: 325 TABLET, FILM COATED ORAL at 08:34

## 2024-11-15 RX ADMIN — SODIUM CHLORIDE, POTASSIUM CHLORIDE, SODIUM LACTATE AND CALCIUM CHLORIDE: 600; 310; 30; 20 INJECTION, SOLUTION INTRAVENOUS at 19:35

## 2024-11-15 RX ADMIN — BISACODYL 10 MG: 10 SUPPOSITORY RECTAL at 11:34

## 2024-11-15 NOTE — PLAN OF CARE
Goal Outcome Evaluation:      Plan of Care Reviewed With: patient    Overall Patient Progress: improvingOverall Patient Progress: improving       Shift: 11/15/24, 2861-9520  POD#/Summary: Partial SBO    Orientation: A&Ox4  Vital Signs: VSS on RA  Pain Management: Tylenol and heat packs  Bowel: BS audible, gas -, BM -   Bladder: Voiding in br  IV: Infusing LR @ 75 mL/hr  Wounds/Incisions: Some bruising on abdomen   Diet: NPO; ice and meds ok  Activity Level: Independent   Anticipated Discharge Date/Plan: Pending  Significant Information: Suppository given this shift.

## 2024-11-15 NOTE — PLAN OF CARE
Goal Outcome Evaluation:      Plan of Care Reviewed With: patient    Overall Patient Progress: improvingOverall Patient Progress: improving    Summary: Here with partial SBO  Behavior & Aggression: Green  Fall Risk: No  Orientation: A&Ox4  ABNL VS/O2: VSS on RA  ABNL Labs: WBC 13.6 & Hb.2  Pain Management: PRN Tylenol & heat packs  Bowel/Bladder: Voiding well in bathroom. BS hypoactive- reports not passing gas  IV: PIV infusing LR at 75 mL/hr  Wounds/incisions: Old abdominal incisions- healing  Diet: NPO except ice & sips with meds. Denies N/V  Activity Level: Independent  Tests/Procedures: General Surgery following  Anticipated  DC Date: Pending

## 2024-11-15 NOTE — PROGRESS NOTES
"General Surgery Progress Note    Admission Date: 11/14/2024  Today's Date: 11/15/2024         Assessment:      Mckayla Grady is a 69 year old female with a complex surgical history including a Whipple 5 years ago for ampullary cancer and recent abdominal exploration for small bowel obstruction at [facility]. Was doing well with diet at home postoperatively, but is now readmitted here for likely partial small bowel obstruction.    Pain improved today, no nausea. Passed small amount of flatus yesterday.         Plan:   - Continue NPO, no NG in place but recommend placement if patient develops any nausea/vomiting  - Dulcolax suppository today. Moderate stool burden seen on CT  - No contrast challenge for now. Proceed cautiously given complex and recent surgical history. Consider gastrografin challenge in the next day or so pending progress  - Protonix IV for GI ppx. Resume maintenance IV fluids today since not taking PO  - Encourage frequent out of bed, ambulation, wear PCDs while resting.  - Medical management per hospitalist. We will continue to follow along closely. Discussed with Dr. Barton.         Interval History:   Afebrile, vitals stable on room air. Mckayla tells me that she feels better than she did upon admission - pain is improved, no nausea. She does still have some abdominal pain, mainly in her lower abdomen towards the right that sometimes radiates into her back. She passed some gas yesterday. No BM since admission. Currently up in the chair.          Physical Exam:   /59 (BP Location: Right arm)   Pulse 68   Temp 98.5  F (36.9  C) (Oral)   Resp 16   Ht 1.575 m (5' 2\")   Wt 58.8 kg (129 lb 11.2 oz)   LMP 05/21/2002   SpO2 95%   BMI 23.72 kg/m    I/O last 3 completed shifts:  In: 906 [I.V.:906]  Out: 600 [Urine:600]  General: NAD, pleasant, alert and oriented x3  Respiratory: non-labored breathing   Abdomen: soft, no significant distention. Mild right lower abdominal tenderness, not severe. Surgical " incisions healing appropriately.   Extremities: no lower extremity edema, no calf tenderness    LABS:  Recent Labs   Lab Test 11/15/24  0714 11/14/24  0812 08/25/20  0843 09/12/19  1040   WBC 10.7 13.6*  --  12.4*   HGB 9.4* 11.2* 12.4 10.7*   MCV 99 96  --  97    533* 216 264      Recent Labs   Lab Test 11/15/24  0714 11/14/24  0812 10/14/24  1412 08/25/20  0843 08/18/19  0000 07/21/19  0701   POTASSIUM 3.7 3.9  --   --  3.8 3.7   CHLORIDE 106 103  --   --   --  107   CO2 24 25  --   --   --  28   BUN 10.9 13.7  --   --   --  9   CR 0.49* 0.53 0.7   < > 0.54* 0.68   ANIONGAP 9 11  --   --   --  7    < > = values in this interval not displayed.      Recent Labs   Lab Test 11/14/24  0812 08/26/19  1309 08/18/19  0000 08/08/19  0906   ALBUMIN 3.8 3.5  --  3.3*   BILITOTAL 0.4 1.3  --  4.9*   ALT 12 47 64* 411*   AST 15 43 51* 227*   ALKPHOS 143 225*  --  296*        -------------------------------    Gisele Matt PA-C  [facility]  [phone]

## 2024-11-15 NOTE — PROGRESS NOTES
"[facility]    Internal Medicine Hospitalist Progress Note  11/15/2024  I evaluated patient on the above date.    Tremaine Rodriguez Jr., MD  [phone] (p)  Text Page  Vocera      [New actions/orders today (11/15/2024) are underlined in the assessment and plan. All lab results in the assessment and plan were reviewed.]  Assessment & Plan      Mckayla Grady is a 69 year old female with history including recent small bowel obstruction with admission to [facility] (10/22-11/5/2024) where she underwent surgery including SB resection (10/27/2024); h/o ampullary carcinoma status post Whipple (2019); who presented 11/14/2024 with abdominal pain.    From H&P:   \"Presented to the ED with 3 days of generalized abdominal pain.  Pain has been has been 9 out of 10 and some associated nausea and vomiting noted earlier this morning.  She denies fevers or chills.  Has not had bowel movement in at least 24 hours and does not recall last time she passed gas.  Some difficulty urinating but otherwise no issues.  She denies shortness of breath, cough, chest pain, or palpitations ... During recent admission at [facility], general surgery was following and patient was trialed with conservative management with NG-after 5 days without improvement she had diagnostic laparoscopy, EKATERINA, small bowel resection due to stricture on 10/27. She was on TPN and passed NG clamp with removal on 11/3. She was discharged on 11/5 with 2 weeks of miralax prn and low fiber diet.\"    On initial evaluation, pt was AF, VSS. Labs notable for CBC with WBC 13.6, hgb 11.2, platelets 533K; BMP normal; LFT's normal, lipase normal.    CT abdomen/pelvis with contrast showed focally dilated anterior lower abdominal small bowel loop with apparent transition point in the mid abdomen, where there was tethering of bowel loops, slight mesenteric swirling and mesenteric congestion concerning for obstruction, possibly from adhesion rather than internal hernia; " there was surrounding inflammation and trace interloop fluid, no pneumatosis or free air; pelvic mesenteric fluid collection measuring 3.2 x 4.3 cm with peripheral enhancement, and as patient noted to have had bowel surgery 10/2024, differential could reflect postoperative fluid collection versus developing abscess.         Partial small bowel obstruction.  Likely postop pelvic fluid collection.  Recent SBO due to stricture - s/p laparotomy, EKATERINA, and SB resection (10/27/2024 at [facility]).  H/o ampullary carcinoma s/p Whipple (2019).  * Patient follows with [place] [facility] for surveillance with recent imaging showing possible 3.4 cm masslike [id number] abdominal MRI showed no evidence of malignancy.  * Recent hospitalization as above. Bowel sounds hypoactive in ED. Started on NPO and IVF's on admit. Surgery consulted on admit.   * 11/15: Suppository ordered by Surgery with some response. Bowel sounds still hypoactive.  - Continue NPO with sips for now.  - Continue LR at 75 ml/hr.  - Continue regular ambulation.  - Continue IV pantoprazole.  - Continue PRN acetaminophen, PRN oxycodone, PRN IV hydromorphone; minimize opioids as able.  - Continue PRN ondansetron, PRN prochlorperazine.  - Appreciate Surgery help.    GERD.  * Started on IV pantoprazole on admit.  - Continue IV pantoprazole.    Depression/anxiety.  Insomnia.  - Continue to hold PTA gabapentin for now to reduce amount of PO meds.  - Continue PTA trazodone at bedtime, PRN lorazepam.    Clinically Significant Risk Factors           # Hypocalcemia: Lowest Ca = 8.3 mg/dL in last 2 days, will monitor and replace as appropriate                               Diet: NPO for Medical/Clinical Reasons Except for: Ice Chips, Meds    Prophylaxis: PCD's and ambulation  Negro Catheter: Not present  Lines: None     Code Status: Full Code    Disposition Plan   Medically Ready for Discharge: Anticipated in 1-2 Days  Expected discharge to home pending above.    Entered:  "Tremaine Rodriguez MD 11/15/2024, 1:39 PM               Interval History   Some flatus yesterday.  Some response with suppository today.  Denies nausea.    * Data reviewed today: I reviewed all new labs and imaging over the last 24 hours. I personally reviewed no images or ECG's today.    Physical Exam   Most recent vitals:   , Blood pressure 119/59, pulse 68, temperature 98.5  F (36.9  C), temperature source Oral, resp. rate 16, height 1.575 m (5' 2\"), weight 58.8 kg (129 lb 11.2 oz), last menstrual period 05/21/2002, SpO2 95%, not currently breastfeeding. O2 Device: None (Room air)    Vitals:    11/14/24 0735 11/15/24 0631   Weight: 58.1 kg (128 lb) 58.8 kg (129 lb 11.2 oz)     Vital signs with ranges:  Temp:  [97.9  F (36.6  C)-99.4  F (37.4  C)] 98.5  F (36.9  C)  Pulse:  [62-68] 68  Resp:  [16-17] 16  BP: (108-131)/(56-71) 119/59  SpO2:  [94 %-98 %] 95 %  Patient Vitals for the past 24 hrs:   BP Temp Temp src Pulse Resp SpO2 Weight   11/15/24 0756 119/59 98.5  F (36.9  C) Oral 68 16 95 % --   11/15/24 0631 -- -- -- -- -- -- 58.8 kg (129 lb 11.2 oz)   11/15/24 0053 -- -- -- -- 16 -- --   11/15/24 0011 128/56 97.9  F (36.6  C) Oral 64 17 94 % --   11/14/24 1802 108/58 99.4  F (37.4  C) Oral 62 16 98 % --   11/14/24 1747 131/71 -- -- -- -- -- --     I/O's last 24 hours:  I/O last 3 completed shifts:  In: 906 [I.V.:906]  Out: 600 [Urine:600]    Constitutional: awake, alert, oriented, pleasant, conversant   Head:   Eyes:   ENT:   Neck:   Cardiovascular: regular rate, regular rhythm, +I/VI systolic murmur, no rubs/gallops, suspect flow murmur  Lungs: diminished in the bases, no crackles or wheezes  Gastrointestinal/Abdomen: soft, non-tender to light touch, non-distended, hypoactive bowel sounds  :   Musculoskeletal:   Skin/Extremities:   Neurologic:   Psychiatric:   Hematologic/Lymphatic/Immunologic:        Labs reviewed.  Recent Labs   Lab 11/15/24  0714 11/14/24  0812   WBC 10.7 13.6*   HGB 9.4* 11.2*   MCV 99 " "96    533*    139   POTASSIUM 3.7 3.9   CHLORIDE 106 103   CO2 24 25   BUN 10.9 13.7   CR 0.49* 0.53   ANIONGAP 9 11   CARMEN 8.3* 9.2   GLC 75 127*   ALBUMIN  --  3.8   PROTTOTAL  --  7.4   BILITOTAL  --  0.4   ALKPHOS  --  143   ALT  --  12   AST  --  15   LIPASE  --  41     No lab results found.  Recent Labs   Lab 11/15/24  0714 11/14/24  0812   GLC 75 127*     No lab results found.    No results for input(s): \"INR\", \"[id number]\" in the last 168 hours.  Recent Labs   Lab 11/15/24  0714 11/14/24  0812   WBC 10.7 13.6*       MICRO:  Cultures (including blood and urine):  No lab results found in last 7 days.    No results found for this or any previous visit (from the past 24 hours).    Medications   All medications were reviewed. MAR.    Infusions:  Current Facility-Administered Medications   Medication Dose Route Frequency Provider Last Rate Last Admin    lactated ringers infusion   Intravenous Continuous Gisele Matt PA-C 75 mL/hr at 11/15/24 1051 Rate Verify at 11/15/24 1051     Scheduled Medications:  Current Facility-Administered Medications   Medication Dose Route Frequency Provider Last Rate Last Admin    [Held by provider] gabapentin (NEURONTIN) capsule 300 mg  300 mg Oral BID Guero Steward MD        [Held by provider] gabapentin (NEURONTIN) capsule 600 mg  600 mg Oral At Bedtime Guero Steward MD        pantoprazole (PROTONIX) IV push injection 40 mg  40 mg Intravenous Daily Guero Steward MD   40 mg at 11/15/24 0835    sodium chloride (PF) 0.9% PF flush 3 mL  3 mL Intracatheter Q8H Guero Steward MD        traZODone (DESYREL) tablet 100 mg  100 mg Oral At Bedtime Guero Steward MD   100 mg at 11/14/24 2249     PRN Medications:  Current Facility-Administered Medications   Medication Dose Route Frequency Provider Last Rate Last Admin    acetaminophen (TYLENOL) tablet 650 mg  650 mg Oral Q4H PRN Guero Steward MD   650 mg at 11/15/24 0834    " Or    acetaminophen (TYLENOL) Suppository 650 mg  650 mg Rectal Q4H PRN Guero Steward MD        albuterol (PROVENTIL HFA/VENTOLIN HFA) inhaler  2 puff Inhalation Q4H PRN Guero Steward MD        calcium carbonate (TUMS) chewable tablet 1,000 mg  1,000 mg Oral 4x Daily PRN Guero Steward MD        HYDROmorphone (PF) (DILAUDID) injection 0.5 mg  0.5 mg Intravenous Q3H PRN Guero Steward MD        lidocaine (LMX4) cream   Topical Q1H PRN Guero Steward MD        lidocaine 1 % 0.1-1 mL  0.1-1 mL Other Q1H PRGuero Easley MD        ondansetron (ZOFRAN ODT) ODT tab 4 mg  4 mg Oral Q6H PRN Guero Steward MD        Or    ondansetron (ZOFRAN) injection 4 mg  4 mg Intravenous Q6H PRN Guero Steward MD        oxyCODONE (ROXICODONE) tablet 5 mg  5 mg Oral Q4H PRN Guero Steward MD        prochlorperazine (COMPAZINE) injection 5 mg  5 mg Intravenous Q6H PRN Guero Steward MD        Or    prochlorperazine (COMPAZINE) tablet 5 mg  5 mg Oral Q6H PRN Guero Steward MD        sodium chloride (PF) 0.9% PF flush 3 mL  3 mL Intracatheter q1 min prn Guero Steward MD

## 2024-11-15 NOTE — PLAN OF CARE
Diagnosis: Partial small bowel obstruction  Mental Status: A&O x4  Activity/dangle: Independent in room  Diet: NPO  Pain: Denies pain  Negro/Voiding: Due to void  Tele/Restraints/Iso: N/A  02/LDA: Room air. LR infusing at 75 mL/hr  D/C Date: TBD  Other Info: Previous 2x surgical incision site on abdominal.

## 2024-11-16 ENCOUNTER — APPOINTMENT (OUTPATIENT)
Dept: GENERAL RADIOLOGY | Facility: CLINIC | Age: 69
DRG: 389 | End: 2024-11-16
Attending: SURGERY
Payer: COMMERCIAL

## 2024-11-16 PROCEDURE — 250N000009 HC RX 250: Performed by: INTERNAL MEDICINE

## 2024-11-16 PROCEDURE — 120N000001 HC R&B MED SURG/OB

## 2024-11-16 PROCEDURE — 99232 SBSQ HOSP IP/OBS MODERATE 35: CPT | Performed by: SURGERY

## 2024-11-16 PROCEDURE — 74018 RADEX ABDOMEN 1 VIEW: CPT

## 2024-11-16 PROCEDURE — 250N000013 HC RX MED GY IP 250 OP 250 PS 637: Performed by: INTERNAL MEDICINE

## 2024-11-16 PROCEDURE — 250N000013 HC RX MED GY IP 250 OP 250 PS 637: Performed by: HOSPITALIST

## 2024-11-16 PROCEDURE — 258N000003 HC RX IP 258 OP 636: Performed by: SURGERY

## 2024-11-16 PROCEDURE — 99232 SBSQ HOSP IP/OBS MODERATE 35: CPT | Performed by: INTERNAL MEDICINE

## 2024-11-16 RX ORDER — SODIUM CHLORIDE, SODIUM LACTATE, POTASSIUM CHLORIDE, CALCIUM CHLORIDE 600; 310; 30; 20 MG/100ML; MG/100ML; MG/100ML; MG/100ML
INJECTION, SOLUTION INTRAVENOUS CONTINUOUS
Status: DISCONTINUED | OUTPATIENT
Start: 2024-11-16 | End: 2024-11-17

## 2024-11-16 RX ORDER — NALOXONE HYDROCHLORIDE 0.4 MG/ML
0.2 INJECTION, SOLUTION INTRAMUSCULAR; INTRAVENOUS; SUBCUTANEOUS
Status: DISCONTINUED | OUTPATIENT
Start: 2024-11-16 | End: 2024-11-19 | Stop reason: HOSPADM

## 2024-11-16 RX ORDER — NALOXONE HYDROCHLORIDE 0.4 MG/ML
0.4 INJECTION, SOLUTION INTRAMUSCULAR; INTRAVENOUS; SUBCUTANEOUS
Status: DISCONTINUED | OUTPATIENT
Start: 2024-11-16 | End: 2024-11-19 | Stop reason: HOSPADM

## 2024-11-16 RX ORDER — DIATRIZOATE MEGLUMINE AND DIATRIZOATE SODIUM 660; 100 MG/ML; MG/ML
90 SOLUTION ORAL; RECTAL ONCE
Status: COMPLETED | OUTPATIENT
Start: 2024-11-16 | End: 2024-11-16

## 2024-11-16 RX ADMIN — OXYCODONE HYDROCHLORIDE 5 MG: 5 TABLET ORAL at 22:44

## 2024-11-16 RX ADMIN — ACETAMINOPHEN 650 MG: 325 TABLET, FILM COATED ORAL at 20:37

## 2024-11-16 RX ADMIN — CALCIUM CARBONATE (ANTACID) CHEW TAB 500 MG 1000 MG: 500 CHEW TAB at 15:05

## 2024-11-16 RX ADMIN — DIATRIZOATE MEGLUMINE AND DIATRIZOATE SODIUM 90 ML: 660; 100 SOLUTION ORAL; RECTAL at 11:35

## 2024-11-16 RX ADMIN — PANTOPRAZOLE SODIUM 40 MG: 40 INJECTION, POWDER, FOR SOLUTION INTRAVENOUS at 08:00

## 2024-11-16 RX ADMIN — SODIUM CHLORIDE, POTASSIUM CHLORIDE, SODIUM LACTATE AND CALCIUM CHLORIDE: 600; 310; 30; 20 INJECTION, SOLUTION INTRAVENOUS at 11:35

## 2024-11-16 RX ADMIN — TRAZODONE HYDROCHLORIDE 100 MG: 50 TABLET ORAL at 22:44

## 2024-11-16 RX ADMIN — ACETAMINOPHEN 650 MG: 325 TABLET, FILM COATED ORAL at 08:00

## 2024-11-16 NOTE — PLAN OF CARE
Goal Outcome Evaluation:    Shift: 11/16/24, 0277-8127  POD#/Summary: Partial SBO     Orientation: A&Ox4  Vital Signs: VSS on RA  Pain Management: Tylenol and heat packs  Bowel: BS audible, gas +, BM -   Bladder: Voiding in br  IV: Infusing LR @ 75 mL/hr  Wounds/Incisions: Some bruising on abdomen   Diet: NPO; ice and meds ok  Activity Level: Independent   Anticipated Discharge Date/Plan: Pending  Significant Information: Contrast given at 1135, Xray scheduled for 2200 tonight.

## 2024-11-16 NOTE — PROGRESS NOTES
A&Ox4. VSS on RA. Denies pain at this time. Denies nausea. Pt had one loose BM after gastrografin. Plan for Abdominal xray at 2200. Ambulating in hallway.

## 2024-11-16 NOTE — PLAN OF CARE
Goal Outcome Evaluation:    Shift Summary [id number]    Admitting Diagnosis: Small bowel obstruction (H) [K56.609]  Pelvic fluid collection [R18.8]  Leukocytosis, unspecified type [D72.829]   Vitals - stable on RA  Pain - managed with PRN tylenol   A&Ox4  Voiding - cont.   Mobility - indep  GI - small bm this shift   Dressing n/a    Orders Placed This Encounter      NPO for Medical/Clinical Reasons Except for: Ice Chips, Meds      NPO for Medical/Clinical Reasons Except for: Meds, Ice Chips, Other; Specify: sips of water       Plan: LR running at 75ml/hr. Tolerating ice chips and sips of water well. Discharge pending.

## 2024-11-16 NOTE — PROGRESS NOTES
"[facility]    Internal Medicine Hospitalist Progress Note  11/16/2024  I evaluated patient on the above date.    Tremaine Rodriguez Jr., MD  [phone] (p)  Text Page  Vocera      [New actions/orders today (11/16/2024) are underlined in the assessment and plan. All lab results in the assessment and plan were reviewed.]  Assessment & Plan      Mckayla Grady is a 69 year old female with history including recent small bowel obstruction with admission to [facility] (10/22-11/5/2024) where she underwent surgery including SB resection (10/27/2024); h/o ampullary carcinoma status post Whipple (2019); who presented 11/14/2024 with abdominal pain.    From H&P:   \"Presented to the ED with 3 days of generalized abdominal pain.  Pain has been has been 9 out of 10 and some associated nausea and vomiting noted earlier this morning.  She denies fevers or chills.  Has not had bowel movement in at least 24 hours and does not recall last time she passed gas.  Some difficulty urinating but otherwise no issues.  She denies shortness of breath, cough, chest pain, or palpitations ... During recent admission at [facility], general surgery was following and patient was trialed with conservative management with NG-after 5 days without improvement she had diagnostic laparoscopy, EKATERINA, small bowel resection due to stricture on 10/27. She was on TPN and passed NG clamp with removal on 11/3. She was discharged on 11/5 with 2 weeks of miralax prn and low fiber diet.\"    On initial evaluation, pt was AF, VSS. Labs notable for CBC with WBC 13.6, hgb 11.2, platelets 533K; BMP normal; LFT's normal, lipase normal.    CT abdomen/pelvis with contrast showed focally dilated anterior lower abdominal small bowel loop with apparent transition point in the mid abdomen, where there was tethering of bowel loops, slight mesenteric swirling and mesenteric congestion concerning for obstruction, possibly from adhesion rather than internal hernia; " there was surrounding inflammation and trace interloop fluid, no pneumatosis or free air; pelvic mesenteric fluid collection measuring 3.2 x 4.3 cm with peripheral enhancement, and as patient noted to have had bowel surgery 10/2024, differential could reflect postoperative fluid collection versus developing abscess.         Partial small bowel obstruction.  Likely postop pelvic fluid collection.  Recent SBO due to stricture - s/p laparotomy, EKATERINA, and SB resection (10/27/2024 at [facility]).  H/o ampullary carcinoma s/p Whipple (2019).  * Patient follows with [place] [facility] for surveillance with recent imaging showing possible 3.4 cm masslike [id number]-[id number] abdominal MRI showed no evidence of malignancy.  * Recent hospitalization as above. Required TPN after surgery.  * Initial presentation as above. Bowel sounds hypoactive in ED. Started on NPO and IVF's on admit. Surgery consulted on admit.   * 11/15: Suppository ordered by Surgery with some response. Bowel sounds still hypoactive.  * 11/16: More pain overnight. Slightly more bowel sounds, but still hypoactive.  - Gastrografin challenge ordered per Surgery.  - Continue NPO with sips for now.  - Continue LR at 75 ml/hr.  - Continue regular ambulation.  - Continue IV pantoprazole.  - Continue PRN acetaminophen, PRN oxycodone, PRN IV hydromorphone; minimize opioids as able.  - Continue PRN ondansetron, PRN prochlorperazine.  - Appreciate Surgery help.    GERD.  * Started on IV pantoprazole on admit.  - Continue IV pantoprazole.    Depression/anxiety.  Insomnia.  - Continue to hold PTA gabapentin for now to reduce amount of PO meds.  - Continue PTA trazodone at bedtime, PRN lorazepam.    Clinically Significant Risk Factors                                         Diet: NPO for Medical/Clinical Reasons Except for: Meds, Ice Chips, Other; Specify: sips of water    Prophylaxis: PCD's and ambulation  Negro Catheter: Not present  Lines: None     Code Status: Full  "Code    Disposition Plan   Medically Ready for Discharge: Anticipated in 2-4 Days  Expected discharge to home pending above.    Entered: Tremaine Rodriguez MD 11/16/2024, 11:42 AM           Interval History   Doing about the same.  More pain overnight, did not sleep well.  Not much flatus.    * Data reviewed today: I reviewed all new labs and imaging over the last 24 hours. I personally reviewed no images or ECG's today.    Physical Exam   Most recent vitals:   , Blood pressure 120/61, pulse 70, temperature 97.9  F (36.6  C), temperature source Oral, resp. rate 17, height 1.575 m (5' 2\"), weight 58.8 kg (129 lb 11.2 oz), last menstrual period 05/21/2002, SpO2 94%, not currently breastfeeding. O2 Device: None (Room air)    Vitals:    11/14/24 0735 11/15/24 0631   Weight: 58.1 kg (128 lb) 58.8 kg (129 lb 11.2 oz)     Vital signs with ranges:  Temp:  [97.9  F (36.6  C)-99  F (37.2  C)] 97.9  F (36.6  C)  Pulse:  [68-70] 70  Resp:  [16-17] 17  BP: (116-127)/(60-61) 120/61  SpO2:  [94 %-97 %] 94 %  Patient Vitals for the past 24 hrs:   BP Temp Temp src Pulse Resp SpO2   11/16/24 0814 120/61 97.9  F (36.6  C) Oral 70 17 94 %   11/15/24 2327 127/61 97.9  F (36.6  C) Oral 68 17 97 %   11/15/24 1602 116/60 99  F (37.2  C) Oral 70 16 96 %     I/O's last 24 hours:  I/O last 3 completed shifts:  In: -   Out: 400 [Urine:400]    Constitutional: awake, alert, oriented, pleasant, conversant   Head:   Eyes:   ENT:   Neck:   Cardiovascular: regular rate, regular rhythm, no murmurs/rubs/gallops  Lungs: diminished in the bases, no crackles or wheezes  Gastrointestinal/Abdomen: soft, non-tender to light touch, non-distended, few bowel sounds  :   Musculoskeletal:   Skin/Extremities:   Neurologic:   Psychiatric:   Hematologic/Lymphatic/Immunologic:        Labs reviewed.  Recent Labs   Lab 11/15/24  0714 11/14/24  0812   WBC 10.7 13.6*   HGB 9.4* 11.2*   MCV 99 96    533*    139   POTASSIUM 3.7 3.9   CHLORIDE 106 103 " "  CO2 24 25   BUN 10.9 13.7   CR 0.49* 0.53   ANIONGAP 9 11   CARMEN 8.3* 9.2   GLC 75 127*   ALBUMIN  --  3.8   PROTTOTAL  --  7.4   BILITOTAL  --  0.4   ALKPHOS  --  143   ALT  --  12   AST  --  15   LIPASE  --  41     No lab results found.  Recent Labs   Lab 11/15/24  0714 11/14/24  0812   GLC 75 127*     No lab results found.    No results for input(s): \"INR\", \"[id number]\" in the last 168 hours.  Recent Labs   Lab 11/15/24  0714 11/14/24  0812   WBC 10.7 13.6*       MICRO:  Cultures (including blood and urine):  No lab results found in last 7 days.    No results found for this or any previous visit (from the past 24 hours).    Medications   All medications were reviewed. MAR.    Infusions:  Current Facility-Administered Medications   Medication Dose Route Frequency Provider Last Rate Last Admin    lactated ringers infusion   Intravenous Continuous Lamar Johnson MD 75 mL/hr at 11/16/24 1135 New Bag at 11/16/24 1135     Scheduled Medications:  Current Facility-Administered Medications   Medication Dose Route Frequency Provider Last Rate Last Admin    [Held by provider] gabapentin (NEURONTIN) capsule 300 mg  300 mg Oral BID Guero Steward MD        [Held by provider] gabapentin (NEURONTIN) capsule 600 mg  600 mg Oral At Bedtime Guero Steward MD        pantoprazole (PROTONIX) IV push injection 40 mg  40 mg Intravenous Daily Tremaine Rodriguez MD   40 mg at 11/16/24 0800    sodium chloride (PF) 0.9% PF flush 3 mL  3 mL Intracatheter Q8H Guero Steward MD        traZODone (DESYREL) tablet 100 mg  100 mg Oral At Bedtime Guero Steward MD   100 mg at 11/15/24 2156     PRN Medications:  Current Facility-Administered Medications   Medication Dose Route Frequency Provider Last Rate Last Admin    acetaminophen (TYLENOL) tablet 650 mg  650 mg Oral Q4H PRN Guero Steward MD   650 mg at 11/16/24 0800    Or    acetaminophen (TYLENOL) Suppository 650 mg  650 mg Rectal Q4H PRN Bin, " Guero Dominguez MD        albuterol (PROVENTIL HFA/VENTOLIN HFA) inhaler  2 puff Inhalation Q4H PRN Guero Steward MD        calcium carbonate (TUMS) chewable tablet 1,000 mg  1,000 mg Oral 4x Daily PRN Guero Steward MD        HYDROmorphone (PF) (DILAUDID) injection 0.5 mg  0.5 mg Intravenous Q3H PRN Tremaine Rodriguez MD        lidocaine (LMX4) cream   Topical Q1H PRN Guero Steward MD        lidocaine 1 % 0.1-1 mL  0.1-1 mL Other Q1H PRN Guero Steward MD        ondansetron (ZOFRAN ODT) ODT tab 4 mg  4 mg Oral Q6H PRN Guero Steward MD        Or    ondansetron (ZOFRAN) injection 4 mg  4 mg Intravenous Q6H PRN Guero Steward MD        oxyCODONE (ROXICODONE) tablet 5 mg  5 mg Oral Q4H PRN Tremaine Rodriguez MD        prochlorperazine (COMPAZINE) injection 5 mg  5 mg Intravenous Q6H PRN Guero Steward MD        Or    prochlorperazine (COMPAZINE) tablet 5 mg  5 mg Oral Q6H PRN Guero Steward MD        sodium chloride (PF) 0.9% PF flush 3 mL  3 mL Intracatheter q1 min prn Guero Steward MD

## 2024-11-16 NOTE — PROGRESS NOTES
"General Surgery Progress Note    Subjective: Mckayla reports she is feeling better overall this morning. Some crampy abdominal pain last night. No nausea today. Passing gas. Had small soft bm yesterday after suppository.     Objective: /61 (BP Location: Right arm)   Pulse 70   Temp 97.9  F (36.6  C) (Oral)   Resp 17   Ht 1.575 m (5' 2\")   Wt 58.8 kg (129 lb 11.2 oz)   LMP 05/21/2002   SpO2 94%   BMI 23.72 kg/m    Gen: sitting up in chair, appears well  CV: RRR  Pulm: nonlabored breathing  Abd: soft, incision lower abdomen is healing well. No erythema or induration. Mildly tender RLQ.   Ext: WWP    Assessment/Plan:   Mckayla Grady  is a 69 year old female admitted with early post operative SBO. Pt has h/o whipple for ampullary ca done in 2019 with Dr. Christie at [facility]. She had done well and then in October presented with SBO and did not improve and underwent diagnostic laparoscopy with small bowel resection via mini laparotomy with Dr. Romero on 10/27. Pt had slow return of bowel function after surgery. She had been on TPN but transitioned to adequate oral intake and was discharged. Prior to presentation here, she had beef stew and had large tough beef chunks she feels may have precipitated her symptoms. Since admission, some improvement in pain and no further nausea/emesis. We discussed options and I would recommend an oral gastrograffin challenge. Plan for XR 8 hrs after completion of oral contrast. If she has nausea or emesis, place NGT to LIS and would try contrast after adequate decompression. We discussed surgery in the early post operative period is high risk and we would like to avoid this if at all possible. At this point, no need for TPN but if NPO for 6-7 days with no improvement, may need again.   - gastrograffin challenge  - XR 8 hrs after completion  - surgery following closely along    Lamar Johnson MD  [facility]  [phone]            "

## 2024-11-16 NOTE — PLAN OF CARE
Goal Outcome Evaluation:      Plan of Care Reviewed With: patient    Overall Patient Progress: improvingOverall Patient Progress: improving       Shift: 11/15/24-11/16/24 0476-5521  POD#/Summary: Partial SBO     Orientation: A&Ox4  Vital Signs: VSS on RA  Pain Management: Tylenol and heat packs  Bowel: BS audible, gas -, BM -   Bladder: Voiding in br  IV: Infusing LR @ 75 mL/hr  Wounds/Incisions: Some bruising on abdomen   Diet: NPO; ice and meds ok  Activity Level: Independent   Anticipated Discharge Date/Plan: Pending  Significant Information: pt reports a BM prior to writer taking over. Reports it was loose.

## 2024-11-17 PROCEDURE — 120N000001 HC R&B MED SURG/OB

## 2024-11-17 PROCEDURE — 250N000013 HC RX MED GY IP 250 OP 250 PS 637: Performed by: INTERNAL MEDICINE

## 2024-11-17 PROCEDURE — 250N000011 HC RX IP 250 OP 636: Performed by: INTERNAL MEDICINE

## 2024-11-17 PROCEDURE — 99231 SBSQ HOSP IP/OBS SF/LOW 25: CPT | Performed by: SURGERY

## 2024-11-17 PROCEDURE — 99233 SBSQ HOSP IP/OBS HIGH 50: CPT | Performed by: INTERNAL MEDICINE

## 2024-11-17 PROCEDURE — 250N000013 HC RX MED GY IP 250 OP 250 PS 637: Performed by: HOSPITALIST

## 2024-11-17 PROCEDURE — 258N000003 HC RX IP 258 OP 636: Performed by: SURGERY

## 2024-11-17 PROCEDURE — 250N000009 HC RX 250: Performed by: INTERNAL MEDICINE

## 2024-11-17 RX ORDER — PANTOPRAZOLE SODIUM 20 MG/1
40 TABLET, DELAYED RELEASE ORAL
Status: DISCONTINUED | OUTPATIENT
Start: 2024-11-18 | End: 2024-11-19 | Stop reason: HOSPADM

## 2024-11-17 RX ORDER — KETOROLAC TROMETHAMINE 15 MG/ML
15 INJECTION, SOLUTION INTRAMUSCULAR; INTRAVENOUS ONCE
Status: COMPLETED | OUTPATIENT
Start: 2024-11-17 | End: 2024-11-17

## 2024-11-17 RX ORDER — IBUPROFEN 600 MG/1
600 TABLET, FILM COATED ORAL 3 TIMES DAILY PRN
Status: DISCONTINUED | OUTPATIENT
Start: 2024-11-17 | End: 2024-11-19 | Stop reason: HOSPADM

## 2024-11-17 RX ADMIN — ACETAMINOPHEN 650 MG: 325 TABLET, FILM COATED ORAL at 06:31

## 2024-11-17 RX ADMIN — SODIUM CHLORIDE, POTASSIUM CHLORIDE, SODIUM LACTATE AND CALCIUM CHLORIDE: 600; 310; 30; 20 INJECTION, SOLUTION INTRAVENOUS at 00:46

## 2024-11-17 RX ADMIN — ACETAMINOPHEN 650 MG: 325 TABLET, FILM COATED ORAL at 23:16

## 2024-11-17 RX ADMIN — KETOROLAC TROMETHAMINE 15 MG: 15 INJECTION, SOLUTION INTRAMUSCULAR; INTRAVENOUS at 10:40

## 2024-11-17 RX ADMIN — ACETAMINOPHEN 650 MG: 325 TABLET, FILM COATED ORAL at 00:46

## 2024-11-17 RX ADMIN — GABAPENTIN 300 MG: 300 CAPSULE ORAL at 14:21

## 2024-11-17 RX ADMIN — GABAPENTIN 600 MG: 300 CAPSULE ORAL at 22:07

## 2024-11-17 RX ADMIN — IBUPROFEN 600 MG: 600 TABLET ORAL at 20:28

## 2024-11-17 RX ADMIN — PANTOPRAZOLE SODIUM 40 MG: 40 INJECTION, POWDER, FOR SOLUTION INTRAVENOUS at 07:54

## 2024-11-17 RX ADMIN — TRAZODONE HYDROCHLORIDE 100 MG: 50 TABLET ORAL at 22:07

## 2024-11-17 RX ADMIN — ACETAMINOPHEN 650 MG: 325 TABLET, FILM COATED ORAL at 14:25

## 2024-11-17 NOTE — PLAN OF CARE
Goal Outcome Evaluation:      Plan of Care Reviewed With: patient    Overall Patient Progress: improvingOverall Patient Progress: improving         Shift: 11/17/24, 1398-9310  POD#/Summary: Partial SBO     Orientation: A&Ox4  Vital Signs: VSS on RA  Pain Management: Scheduled toradol and gabapentin, PRN tylenol x1  Bowel: Gastrografin challenge successful - reporting multiple loose stools since administration on 11/16 but none today  Bladder: Voiding in br  IV: L PIV - SL  Wounds/Incisions: Some bruising on abdomen  Diet: Full liquids - tolerating   Activity Level: Independent   Tests/Procedures: Xray last night - see results   Anticipated Discharge Date/Plan: Pending

## 2024-11-17 NOTE — PROGRESS NOTES
"[facility]    Internal Medicine Hospitalist Progress Note  11/17/2024  I evaluated patient on the above date.    Tremaine Rodriguez Jr., MD  [phone] (p)  Text Page  Vocera      [New actions/orders today (11/17/2024) are underlined in the assessment and plan. All lab results in the assessment and plan were reviewed.]  Assessment & Plan      Mckayla Grady is a 69 year old female with history including recent small bowel obstruction with admission to [facility] (10/22-11/5/2024) where she underwent surgery including SB resection (10/27/2024); h/o ampullary carcinoma status post Whipple (2019); who presented 11/14/2024 with abdominal pain.    From H&P:   \"Presented to the ED with 3 days of generalized abdominal pain.  Pain has been has been 9 out of 10 and some associated nausea and vomiting noted earlier this morning.  She denies fevers or chills.  Has not had bowel movement in at least 24 hours and does not recall last time she passed gas.  Some difficulty urinating but otherwise no issues.  She denies shortness of breath, cough, chest pain, or palpitations ... During recent admission at [facility], general surgery was following and patient was trialed with conservative management with NG-after 5 days without improvement she had diagnostic laparoscopy, EKATERINA, small bowel resection due to stricture on 10/27. She was on TPN and passed NG clamp with removal on 11/3. She was discharged on 11/5 with 2 weeks of miralax prn and low fiber diet.\"    On initial evaluation, pt was AF, VSS. Labs notable for CBC with WBC 13.6, hgb 11.2, platelets 533K; BMP normal; LFT's normal, lipase normal.    CT abdomen/pelvis with contrast showed focally dilated anterior lower abdominal small bowel loop with apparent transition point in the mid abdomen, where there was tethering of bowel loops, slight mesenteric swirling and mesenteric congestion concerning for obstruction, possibly from adhesion rather than internal hernia; " there was surrounding inflammation and trace interloop fluid, no pneumatosis or free air; pelvic mesenteric fluid collection measuring 3.2 x 4.3 cm with peripheral enhancement, and as patient noted to have had bowel surgery 10/2024, differential could reflect postoperative fluid collection versus developing abscess.         Partial small bowel obstruction, resolving.  Likely postop pelvic fluid collection.  Recent SBO due to stricture - s/p laparotomy, EKATERINA, and SB resection (10/27/2024 at [facility]).  H/o ampullary carcinoma s/p Whipple (2019).  * Patient follows with [facility] for surveillance with recent imaging showing possible 3.4 cm masslike [id number] abdominal MRI showed no evidence of malignancy.  * Recent hospitalization as above. Required TPN after surgery.  * Initial presentation as above. Bowel sounds hypoactive in ED. Started on NPO and IVF's on admit. Surgery consulted on admit.   * 11/15: Suppository ordered by Surgery with some response. Bowel sounds still hypoactive.  * 11/16: More pain overnight. Slightly more bowel sounds, but still hypoactive. Gastrografin challenge ordered by Surgery. AXR showed no evidence of bowel obstruction; enteric contrast material that was administered present throughout a nondilated colon, all the way to the rectum.   * 11/17: Improved after gastrografin. More bowel sounds.  - Advance to clears per Surgery.  - Dc IVF's.  - Continue regular ambulation.  - Continue PRN acetaminophen, PRN oxycodone, PRN IV hydromorphone; minimize opioids as able.  - Continue PRN ondansetron, PRN prochlorperazine.  - Appreciate Surgery help.    Chest pain, suspect musculoskeletal/pleuritis/costochondritis.  * 11/17: Pt c/o pleuritic lower sternal/epigastric pain, reproducible with palpation.  - Order IV ketorolac 15 mg x1 for now.  - Order PRN ibuprofen 600 mg TID for a limited time.  - Continue PPI while on NSAIDs.    Chronic spine/disc disease with chronic low back pain.  * PTA  "gabapentin held on admit given pSBO.  * On 11/17, pt c/o worsened pain.  - Restart PTA gabapentin.  - Continue PRN acetaminophen.    Anemia, suspect chronic and/or dilutional component.  * Hgb 11.2 on admit. No overt clinical signs of major bleeding.  Recent Labs   Lab 11/15/24  0714 11/14/24  0812   HGB 9.4* 11.2*   - Continue to monitor CBC - repeat in am.    Leukocytosis, suspect reactive, resolved.  * WBC 13.6 on admit. Pt afebrile, no clear signs of infection on admission.  * WBC normalized 11/15.    GERD.  * Started on IV pantoprazole on admit.  - Continue pantoprazole - change to PO.    Depression/anxiety.  Insomnia.  - Continue PTA trazodone at bedtime, PRN lorazepam.    Clinically Significant Risk Factors                                         Diet: Clear Liquid Diet    Prophylaxis: PCD's and ambulation  Negro Catheter: Not present  Lines: None     Code Status: Full Code    Disposition Plan   Medically Ready for Discharge: Anticipated in 1-2 Days  Expected discharge to home pending above.    Entered: Tremaine Rodriguez MD 11/17/2024, 9:04 AM           Interval History   Pt c/o chest/upper abdominal pain with deep breaths overnight.  Also c/o aches and pains from chronic spine/disc disease (PTA gabapentin has been held).  Otherwise, noted increased bowel activity with BM after gastrografin yesterday.  Tolerating clears.    * Data reviewed today: I reviewed all new labs and imaging over the last 24 hours. I personally reviewed no images or ECG's today.    Physical Exam   Most recent vitals:   , Blood pressure 123/57, pulse 74, temperature 97.4  F (36.3  C), temperature source Axillary, resp. rate 17, height 1.575 m (5' 2\"), weight 59.1 kg (130 lb 5.7 oz), last menstrual period 05/21/2002, SpO2 96%, not currently breastfeeding. O2 Device: None (Room air)    Vitals:    11/14/24 0735 11/15/24 0631 11/17/24 0500   Weight: 58.1 kg (128 lb) 58.8 kg (129 lb 11.2 oz) 59.1 kg (130 lb 5.7 oz)     Vital signs with " "ranges:  Temp:  [97.4  F (36.3  C)-98.7  F (37.1  C)] 97.4  F (36.3  C)  Pulse:  [63-74] 74  Resp:  [17] 17  BP: (123-142)/(57-62) 123/57  SpO2:  [96 %-98 %] 96 %  Patient Vitals for the past 24 hrs:   BP Temp Temp src Pulse Resp SpO2 Weight   11/17/24 0852 123/57 97.4  F (36.3  C) Axillary 74 17 96 % --   11/17/24 0500 -- -- -- -- -- -- 59.1 kg (130 lb 5.7 oz)   11/16/24 2242 (!) 142/62 98.7  F (37.1  C) Oral 63 17 98 % --   11/16/24 1558 133/61 97.5  F (36.4  C) Oral 67 17 97 % --     I/O's last 24 hours:  I/O last 3 completed shifts:  In: 2865 [I.V.:2865]  Out: 500 [Urine:500]    Constitutional: awake, alert, oriented, pleasant, conversant   Head:   Eyes:   ENT:   Neck:   Cardiovascular: regular rate, regular rhythm, no murmurs/rubs/gallops  Chest:  Tender to palpation lower sternal chest, reproduced her chest pain.  Lungs: diminished in the bases, no crackles or wheezes  Gastrointestinal/Abdomen: soft, non-tender to light touch, non-distended, more bowel sounds  :   Musculoskeletal:   Skin/Extremities:   Neurologic:   Psychiatric:   Hematologic/Lymphatic/Immunologic:      Labs reviewed.  Recent Labs   Lab 11/15/24  0714 11/14/24  0812   WBC 10.7 13.6*   HGB 9.4* 11.2*   MCV 99 96    533*    139   POTASSIUM 3.7 3.9   CHLORIDE 106 103   CO2 24 25   BUN 10.9 13.7   CR 0.49* 0.53   ANIONGAP 9 11   CARMEN 8.3* 9.2   GLC 75 127*   ALBUMIN  --  3.8   PROTTOTAL  --  7.4   BILITOTAL  --  0.4   ALKPHOS  --  143   ALT  --  12   AST  --  15   LIPASE  --  41     No lab results found.  Recent Labs   Lab 11/15/24  0714 11/14/24  0812   GLC 75 127*     No lab results found.    No results for input(s): \"INR\", \"[id number]\" in the last 168 hours.  Recent Labs   Lab 11/15/24  0714 11/14/24  0812   WBC 10.7 13.6*       MICRO:  Cultures (including blood and urine):  No lab results found in last 7 days.    Recent Results (from the past 24 hours)   XR Abdomen 1 View    Narrative    EXAM: ABDOMEN SINGLE VIEW  LOCATION: [facility]  DATE: 11/16/2024    INDICATION: Follow-up oral Gastrografin challenge for small bowel obstruction.  COMPARISON: 11/14/2024 at 0923 hours - CT abdomen and pelvis.    FINDINGS: No visualized dilated small or large bowel. Enteric contrast material that has been administered is present throughout a nondilated colon, all the way to the rectum. No visualized bowel wall thickening or pneumatosis. Fusion hardware in the   lower lumbar spine.      Impression    IMPRESSION: No evidence of bowel obstruction. Enteric contrast material that was administered is present throughout a nondilated colon, all the way to the rectum.        Medications   All medications were reviewed. MAR.    Infusions:  Current Facility-Administered Medications   Medication Dose Route Frequency Provider Last Rate Last Admin     Scheduled Medications:  Current Facility-Administered Medications   Medication Dose Route Frequency Provider Last Rate Last Admin    [Held by provider] gabapentin (NEURONTIN) capsule 300 mg  300 mg Oral BID Guero Steward MD        [Held by provider] gabapentin (NEURONTIN) capsule 600 mg  600 mg Oral At Bedtime Guero Steward MD        pantoprazole (PROTONIX) IV push injection 40 mg  40 mg Intravenous Daily Tremaine Rodriguez MD   40 mg at 11/17/24 0754    sodium chloride (PF) 0.9% PF flush 3 mL  3 mL Intracatheter Q8H Guero Steward MD        traZODone (DESYREL) tablet 100 mg  100 mg Oral At Bedtime Guero Steward MD   100 mg at 11/16/24 2244     PRN Medications:  Current Facility-Administered Medications   Medication Dose Route Frequency Provider Last Rate Last Admin    acetaminophen (TYLENOL) tablet 650 mg  650 mg Oral Q4H PRN Guero Steward MD   650 mg at 11/17/24 0631    Or    acetaminophen (TYLENOL) Suppository 650 mg  650 mg Rectal Q4H PRN Guero Steward MD        albuterol (PROVENTIL HFA/VENTOLIN HFA) inhaler  2 puff Inhalation Q4H PRN  Guero Steward MD        calcium carbonate (TUMS) chewable tablet 1,000 mg  1,000 mg Oral 4x Daily PRN Guero Steward MD   1,000 mg at 11/16/24 1505    HYDROmorphone (PF) (DILAUDID) injection 0.5 mg  0.5 mg Intravenous Q3H PRN Tremaine Rodriguez MD        lidocaine (LMX4) cream   Topical Q1H PRN Guero Steward MD        lidocaine 1 % 0.1-1 mL  0.1-1 mL Other Q1H PRN Guero Steward MD        naloxone (NARCAN) injection 0.2 mg  0.2 mg Intravenous Q2 Min PRN Cristy Cuenca RPH        Or    naloxone (NARCAN) injection 0.4 mg  0.4 mg Intravenous Q2 Min PRN Cristy Cuenca RPH        Or    naloxone (NARCAN) injection 0.2 mg  0.2 mg Intramuscular Q2 Min PRN Cristy Cuenca RPH        Or    naloxone (NARCAN) injection 0.4 mg  0.4 mg Intramuscular Q2 Min PRN Cristy Cuenca RPH        ondansetron (ZOFRAN ODT) ODT tab 4 mg  4 mg Oral Q6H PRN Guero Steward MD        Or    ondansetron (ZOFRAN) injection 4 mg  4 mg Intravenous Q6H PRN Guero Steward MD        oxyCODONE (ROXICODONE) tablet 5 mg  5 mg Oral Q4H PRN Tremaine Rodriguez MD   5 mg at 11/16/24 2244    prochlorperazine (COMPAZINE) injection 5 mg  5 mg Intravenous Q6H PRN Guero Steward MD        Or    prochlorperazine (COMPAZINE) tablet 5 mg  5 mg Oral Q6H PRN Guero Steward MD        sodium chloride (PF) 0.9% PF flush 3 mL  3 mL Intracatheter q1 min prn Guero Steward MD

## 2024-11-17 NOTE — PLAN OF CARE
Goal Outcome Evaluation:      Plan of Care Reviewed With: patient    Overall Patient Progress: improvingOverall Patient Progress: improving       Shift: 11/16/24-11/17/24 2184-5157  POD#/Summary: Partial SBO     Orientation: A&Ox4  Vital Signs: VSS on RA  Pain Management: Tylenol and heat packs  Bowel: BS audible, patient had gastrografin yesterday resulting in multiple loose stools.    Bladder: Voiding in br  IV: Infusing LR @ 75 mL/hr  Wounds/Incisions: Some bruising on abdomen   Diet: NPO; ice and meds ok  Activity Level: Independent   Anticipated Discharge Date/Plan: Pending  Significant Information: abd x ray completed last night, see results.

## 2024-11-17 NOTE — PROGRESS NOTES
"General Surgery Progress Note    Subjective: Mckayla reports she had many loose stools after contrast yesterday. XR shows contrast in colon and non obstructive bowel pattern. She has had some ongoing pain on and off, more on the RLQ.     Objective: /57 (BP Location: Right arm)   Pulse 74   Temp 97.4  F (36.3  C) (Axillary)   Resp 17   Ht 1.575 m (5' 2\")   Wt 59.1 kg (130 lb 5.7 oz)   LMP 05/21/2002   SpO2 96%   BMI 23.84 kg/m    Gen: sitting up in chair, appears well.   CV: RRR  Pulm: nonlabored breathing  Abd: soft, mildly tender to palpation RLQ, incisions healing well.   Ext: WWP    Assessment/Plan:   Mckayla Grady  is a 69 year old female admitted with early post operative SBO. Pt has h/o whipple for ampullary ca done in 2019 with Dr. Christie at [place]. She had done well and then in October presented with SBO and did not improve and underwent diagnostic laparoscopy with small bowel resection via mini laparotomy with Dr. Romero on 10/27.     Contrast challenge on 11/16 successful. No indication for surgery.   - clear liquid diet, ok for full liquid later today  - advance to regular tomorrow and home tomorrow if continues to do well  - pain should improve with time  - discussed staying on miralax after discharge with goal of soft stool daily    Lamar Johnson MD  [facility]  [phone]            "

## 2024-11-18 LAB
ANION GAP SERPL CALCULATED.3IONS-SCNC: 5 MMOL/L (ref 7–15)
BUN SERPL-MCNC: 3.7 MG/DL (ref 8–23)
CA-I BLD-MCNC: 4.4 MG/DL (ref 4.4–5.2)
CALCIUM SERPL-MCNC: 8.3 MG/DL (ref 8.8–10.4)
CHLORIDE SERPL-SCNC: 107 MMOL/L (ref 98–107)
CREAT SERPL-MCNC: 0.43 MG/DL (ref 0.51–0.95)
EGFRCR SERPLBLD CKD-EPI 2021: >90 ML/MIN/1.73M2
ERYTHROCYTE [DISTWIDTH] IN BLOOD BY AUTOMATED COUNT: 12 % (ref 10–15)
GLUCOSE SERPL-MCNC: 125 MG/DL (ref 70–99)
HCO3 SERPL-SCNC: 26 MMOL/L (ref 22–29)
HCT VFR BLD AUTO: 26.3 % (ref 35–47)
HGB BLD-MCNC: 9.1 G/DL (ref 11.7–15.7)
MCH RBC QN AUTO: 33 PG (ref 26.5–33)
MCHC RBC AUTO-ENTMCNC: 34.6 G/DL (ref 31.5–36.5)
MCV RBC AUTO: 95 FL (ref 78–100)
PLATELET # BLD AUTO: 298 10E3/UL (ref 150–450)
POTASSIUM SERPL-SCNC: 3.6 MMOL/L (ref 3.4–5.3)
RBC # BLD AUTO: 2.76 10E6/UL (ref 3.8–5.2)
SODIUM SERPL-SCNC: 138 MMOL/L (ref 135–145)
WBC # BLD AUTO: 11.8 10E3/UL (ref 4–11)

## 2024-11-18 PROCEDURE — 36415 COLL VENOUS BLD VENIPUNCTURE: CPT | Performed by: INTERNAL MEDICINE

## 2024-11-18 PROCEDURE — 120N000001 HC R&B MED SURG/OB

## 2024-11-18 PROCEDURE — 250N000013 HC RX MED GY IP 250 OP 250 PS 637: Performed by: INTERNAL MEDICINE

## 2024-11-18 PROCEDURE — 36415 COLL VENOUS BLD VENIPUNCTURE: CPT

## 2024-11-18 PROCEDURE — 80048 BASIC METABOLIC PNL TOTAL CA: CPT | Performed by: INTERNAL MEDICINE

## 2024-11-18 PROCEDURE — 99231 SBSQ HOSP IP/OBS SF/LOW 25: CPT | Mod: GC | Performed by: STUDENT IN AN ORGANIZED HEALTH CARE EDUCATION/TRAINING PROGRAM

## 2024-11-18 PROCEDURE — 250N000013 HC RX MED GY IP 250 OP 250 PS 637: Performed by: HOSPITALIST

## 2024-11-18 PROCEDURE — 250N000013 HC RX MED GY IP 250 OP 250 PS 637: Performed by: STUDENT IN AN ORGANIZED HEALTH CARE EDUCATION/TRAINING PROGRAM

## 2024-11-18 PROCEDURE — 82330 ASSAY OF CALCIUM: CPT

## 2024-11-18 PROCEDURE — 85014 HEMATOCRIT: CPT

## 2024-11-18 RX ORDER — AMOXICILLIN 250 MG
1 CAPSULE ORAL AT BEDTIME
Status: DISCONTINUED | OUTPATIENT
Start: 2024-11-18 | End: 2024-11-19 | Stop reason: HOSPADM

## 2024-11-18 RX ORDER — POLYETHYLENE GLYCOL 3350 17 G/17G
17 POWDER, FOR SOLUTION ORAL DAILY
Status: DISCONTINUED | OUTPATIENT
Start: 2024-11-18 | End: 2024-11-19 | Stop reason: HOSPADM

## 2024-11-18 RX ORDER — BISACODYL 10 MG
10 SUPPOSITORY, RECTAL RECTAL DAILY
Status: DISCONTINUED | OUTPATIENT
Start: 2024-11-18 | End: 2024-11-19 | Stop reason: HOSPADM

## 2024-11-18 RX ADMIN — GABAPENTIN 300 MG: 300 CAPSULE ORAL at 08:11

## 2024-11-18 RX ADMIN — SENNOSIDES AND DOCUSATE SODIUM 1 TABLET: 50; 8.6 TABLET ORAL at 21:17

## 2024-11-18 RX ADMIN — GABAPENTIN 300 MG: 300 CAPSULE ORAL at 14:45

## 2024-11-18 RX ADMIN — GABAPENTIN 600 MG: 300 CAPSULE ORAL at 21:17

## 2024-11-18 RX ADMIN — ACETAMINOPHEN 650 MG: 325 TABLET, FILM COATED ORAL at 14:46

## 2024-11-18 RX ADMIN — TRAZODONE HYDROCHLORIDE 100 MG: 50 TABLET ORAL at 21:17

## 2024-11-18 RX ADMIN — ACETAMINOPHEN 650 MG: 325 TABLET, FILM COATED ORAL at 19:21

## 2024-11-18 RX ADMIN — BISACODYL 10 MG: 10 SUPPOSITORY RECTAL at 15:32

## 2024-11-18 RX ADMIN — POLYETHYLENE GLYCOL 3350 17 G: 17 POWDER, FOR SOLUTION ORAL at 15:32

## 2024-11-18 RX ADMIN — ACETAMINOPHEN 650 MG: 325 TABLET, FILM COATED ORAL at 08:11

## 2024-11-18 RX ADMIN — PANTOPRAZOLE SODIUM 40 MG: 20 TABLET, DELAYED RELEASE ORAL at 06:00

## 2024-11-18 RX ADMIN — ACETAMINOPHEN 650 MG: 325 TABLET, FILM COATED ORAL at 03:56

## 2024-11-18 NOTE — PROGRESS NOTES
"Surgery Progress Note  11/18/2024       Assessment/Plan:   Mckayla Grady is a 69 year old female who is admitted with small bowel obstruction.  Surgical history notable for pancreaticoduodenectomy in 2019, and recent admission for small bowel obstruction with small bowel resection late October of this year.  A Gastrografin challenge was completed 2 days ago which demonstrated contrast within the colon followed by multiple bowel movements.  Clinically, she is otherwise doing well.  Continues to pass flatus.  Abdominal distention resolving.  No acute events overnight.  She is otherwise doing okay this morning.  No bowel movement yet this morning; however, she is passing flatus without difficulty.  Her abdomen feels significantly better when compared to the previous days.    Low fiber diet as tolerated  Nutrition consult for diet education  Daily bowel regimen, senna/MiraLAX  Reasonable to consider discharge later today if continues to tolerate diet    Subjective:  No acute events overnight.  She is otherwise doing okay this morning.  No bowel movement yet this morning; however, she is passing flatus without difficulty.  Her abdomen feels significantly better when compared to the previous days.     Objective:  /66   Pulse 77   Temp 99  F (37.2  C) (Oral)   Resp 16   Ht 1.575 m (5' 2\")   Wt 59.1 kg (130 lb 5.7 oz)   LMP 05/21/2002   SpO2 95%   BMI 23.84 kg/m      Const: Well nourished, well developed, appears stated age  CV: Warm, well-perfused extremities  RESP: Unlabored respiratory effort  GI: soft, non-tender, non-distended  MSK: No gross deformities appreciated  Skin: Warm, dry. No rashes     Labs:  Recent Labs   Lab 11/18/24  1126 11/15/24  0714 11/14/24  0812   WBC 11.8* 10.7 13.6*   HGB 9.1* 9.4* 11.2*    391 533*       Recent Labs   Lab 11/18/24  0658 11/15/24  0714 11/14/24  0812    139 139   POTASSIUM 3.6 3.7 3.9   CHLORIDE 107 106 103   CO2 26 24 25   BUN 3.7* 10.9 13.7   CR 0.43* " 0.49* 0.53   * 75 127*   CARMEN 8.3* 8.3* 9.2       Seen, examined, and discussed with Dr. Adriano Pisano  - - - - - - - - - - - - - - - - - -  Julio Woodward MD   General Surgery, PGY-4  General Surgery and Acute Care Surgery  Please message via [facility] secure chat with questions

## 2024-11-18 NOTE — PLAN OF CARE
Date & Time: 2300-0700  Summary: Here with partial SBO  Behavior & Aggression: Green  Fall Risk: No  Orientation: A&Ox4  ABNL VS/O2:VSS on RA   ABNL Labs: Hgb 9.4  Pain Management: Heat packs, PRN tylenol, abd binder   Bowel/Bladder: Continent. Voiding adequately. Passing gas, no BM during shift   Drains: PIV SL   Wounds/incisions: Abdominal bruising  Diet:Tolerating fulls, denies N/V   Activity Level: IND   Anticipated  DC Date: Pending, possibly today if tolerating diet advancement

## 2024-11-18 NOTE — PLAN OF CARE
Goal Outcome Evaluation:      Plan of Care Reviewed With: patient    Overall Patient Progress: improvingOverall Patient Progress: improving       Shift: 11/17/24, 2611-0371     Summary: partial SBO  Orientation: A&Ox4  Vital Signs: VSS on RA  Labs: Hgb 9.4  Pain Management: PRN Ibuprofen, heat packs, abd binder  Bowel: pt passing gas  Bladder: Voiding in br  IV: L PIV - SL  Wounds/Incisions: Some bruising on abdomen  Diet: Full liquids - tolerating   Activity Level: Independent   Tests/Procedures: gen surg following  Anticipated Discharge Date/Plan: Pending, likely tomorrow if tolerates diet.

## 2024-11-18 NOTE — PROGRESS NOTES
"[facility]    Medicine Progress Note - Hospitalist Service    Date of Admission:  11/14/2024    Assessment & Plan   Mckayla Grady is a 69 year old female with history including recent small bowel obstruction with admission to [facility] (10/22/2024-11/5/2024) where she underwent surgery including SB resection (10/27/2024); h/o ampullary carcinoma status post Whipple (2019), Gemzar and Xeloda ; among others, who presented 11/14/2024 for further evaluation of abdominal pain.     From H&P:   \"Presented to the ED with 3 days of generalized abdominal pain. Pain has been 9/10 with associated nausea and vomiting. She denies fevers or chills. Has not had bowel movement in at least 24 hours and does not recall last time she passed gas. Some difficulty urinating but otherwise no issues. She denies shortness of breath, cough, chest pain, or palpitations ... During recent admission at [facility], general surgery was following and patient was trialed with conservative management with NG-after 5 days without improvement she had diagnostic laparoscopy, EKATERINA, small bowel resection due to stricture on 10/27. She was on TPN and passed NG clamp with removal on 11/3. She was discharged on 11/5 with 2 weeks of miralax prn and low fiber diet.\"     On initial evaluation, pt was AF, VSS. Labs notable for CBC with WBC 13.6, Hgb 11.2, platelets 533K; BMP normal; LFT's normal, lipase normal. CT abdomen/pelvis with contrast showed focally dilated anterior lower abdominal small bowel loop with apparent transition point in the mid abdomen, where there was tethering of bowel loops, slight mesenteric swirling and mesenteric congestion concerning for obstruction, possibly from adhesion rather than internal hernia; there was surrounding inflammation and trace interloop fluid, no pneumatosis or free air; pelvic mesenteric fluid collection measuring 3.2 x 4.3 cm with peripheral enhancement, and as patient noted to have had bowel " surgery 10/2024, differential could reflect postoperative fluid collection versus developing abscess.      Partial small bowel obstruction.   Suspect post-op pelvic fluid collection.  Recent SBO due to stricture - s/p laparotomy, EKATERINA, and SB resection (10/27/2024 at [facility]).  H/o ampullary carcinoma s/p whipple (2019).  *Patient follows with [facility] for surveillance with recent imaging showing possible 3.4 cm masslike region. Follow-up abdominal MRI showed without evidence of malignancy. H/o whipple for ampullary carcinoma completed in 2019 with Dr. Christie at [place].   *Recent hospitalization for SBO at [facility] between 10/22-11/05 requiring surgery including small bowel resection (10/27). Required TPN after surgery.  *Initial presentation as above. Bowel sounds hypoactive in ED. Started on NPO and IVF's on admit. Surgery consulted on admission.  *11/15: Suppository ordered by surgery with some response. Bowel sounds still hypoactive.  *11/16: More pain overnight. Slightly more bowel sounds, but still hypoactive. Gastrografin challenge ordered by Surgery. AXR showed no evidence of bowel obstruction; enteric contrast material that was administered present throughout a nondilated colon, all the way to the rectum.   *11/17: Several loose stools after gastrografin. More bowel sounds. XR demonstrated contrast in colon and non obstructive bowel pattern. Intermittent pain with RLQ noted to be most significant source of pain.   11/18: Several loose, watery stools following enema.   - Advanced to clears and then full liquid, per General Surgery 11/17. Tolerated well. Advanced to regular diet 11/18 and continues to tolerate.   - No bowel movement since immediately following gastografin challenge despite bowel regimen. Suppository added 11/18 with several loose, watery stools per patient.   - Encouraged ambulation.  - Low fiber diet. Education from nutrition completed.   - Continue PRN acetaminophen, PRN oxycodone,  PRN IV hydromorphone; minimize opioids as able.  - Continue PRN ondansetron, PRN prochlorperazine.  - Continue to monitor with morning labs.      Chest pain, suspect musculoskeletal/pleuritis/costochondritis.  *11/17: Pt c/o pleuritic lower sternal/epigastric pain, reproducible with palpation.  - IV ketorolac 15 mg x1 11/17.  - Started PRN ibuprofen 600 mg TID 11/17. Counseled on limited course of treatment with ibuprofen.   - Continue PPI while on NSAIDs.     Chronic spine/disc disease with chronic low back pain.  *PTA gabapentin held on admit given pSBO.  *On 11/17, pt c/o worsened pain.  - Restarted PTA gabapentin 11/17. Continue PTA gabapentin.  - Continue PRN acetaminophen.     Anemia, suspect chronic and/or dilutional component.  * Hgb 11.2 on admit. Hgb 9.1 11/18. No overt clinical signs of major bleeding.   - Continue to monitor with morning labs.      Leukocytosis, suspect reactive.  * WBC 13.6 on admit. Resolved although following gastrogafin challenge WBC 11.8 11/18. Pt afebrile, no clear signs of infection on admission.  - Continue to monitor with morning labs.     GERD.  * Started on IV pantoprazole on admit.  - Continue pantoprazole per above.     Depression/anxiety.  Insomnia.  - Continue PTA trazodone at bedtime, PRN lorazepam.        Diet: Regular Diet Adult    DVT Prophylaxis: Pneumatic Compression Devices  Negro Catheter: Not present  Lines: None     Cardiac Monitoring: None  Code Status: Full Code      Clinically Significant Risk Factors                                         Social Drivers of Health    Tobacco Use: Medium Risk (10/22/2024)    Received from [employer] [facility]    Patient History     Smoking Tobacco Use: Former     Smokeless Tobacco Use: Never          Disposition Plan     Medically Ready for Discharge: Anticipated Tomorrow       The patient's care was discussed with the Attending Physician, Dr. Padilla .    Leti Gagnon PA-C  Hospitalist  Service  [facility]  Securely message with [employer] (more info)  Text page via [facility] Paging/Directory   ______________________________________________________________________    Interval History   No acute events overnight. Patient continues to tolerate advancement in diet including regular diet 11/18. Bowel regimen added per general surgery. Passing flatus. Suppository given 11/18 given no BM since gastrografin challenge 11/17. Several watery stools following. Patient otherwise notes continued tenderness to RLQ although notes this has improved since admission. No evidence of melena or RBPR per patient. No recent fevers or chills. No additional concerns or complaints at this time.     Physical Exam   Vital Signs: Temp: 99  F (37.2  C) Temp src: Oral BP: 123/66 Pulse: 77   Resp: 16 SpO2: 95 % O2 Device: None (Room air)    Weight: 130 lbs 5.73 oz  GENERAL:  Pleasant, cooperative, alert. Sitting in chair upon examination in no acute distress.   HEENT: Normocephalic, atraumatic.     PULMONOLOGY: Clear to auscultation bilaterally.  CARDIAC: Regular rate and rhythm.    ABDOMEN: Soft, non distended. Mild tenderness to palpation RLQ. No rebound or guarding. BS+ all four quadrants.   MUSCULOSKELETAL:  Moving x 4 spontaneously with CMS intact x4.  Normal bulk and tone.  No LE edema.  Radial pulses 2+ bilaterally.    NEURO: Alert and oriented x3. Nonfocal exam.    Medical Decision Making       40 MINUTES SPENT BY ME on the date of service doing chart review, history, exam, documentation & further activities per the note.      Data   ------------------------- PAST 24 HR DATA REVIEWED -----------------------------------------------    I have personally reviewed the following data over the past 24 hrs:    11.8 (H)  \   9.1 (L)   / 298     138 107 3.7 (L) /  125 (H)   3.6 26 0.43 (L) \       Imaging results reviewed over the past 24 hrs:   No results found for this or any previous visit (from the past 24  hours).

## 2024-11-19 VITALS
BODY MASS INDEX: 24.29 KG/M2 | OXYGEN SATURATION: 97 % | DIASTOLIC BLOOD PRESSURE: 64 MMHG | RESPIRATION RATE: 16 BRPM | WEIGHT: 132 LBS | HEART RATE: 71 BPM | TEMPERATURE: 98.2 F | HEIGHT: 62 IN | SYSTOLIC BLOOD PRESSURE: 126 MMHG

## 2024-11-19 LAB
ANION GAP SERPL CALCULATED.3IONS-SCNC: 11 MMOL/L (ref 7–15)
BUN SERPL-MCNC: 6.1 MG/DL (ref 8–23)
CALCIUM SERPL-MCNC: 8.7 MG/DL (ref 8.8–10.4)
CHLORIDE SERPL-SCNC: 104 MMOL/L (ref 98–107)
CREAT SERPL-MCNC: 0.46 MG/DL (ref 0.51–0.95)
EGFRCR SERPLBLD CKD-EPI 2021: >90 ML/MIN/1.73M2
ERYTHROCYTE [DISTWIDTH] IN BLOOD BY AUTOMATED COUNT: 12.2 % (ref 10–15)
GLUCOSE SERPL-MCNC: 105 MG/DL (ref 70–99)
HCO3 SERPL-SCNC: 26 MMOL/L (ref 22–29)
HCT VFR BLD AUTO: 29.3 % (ref 35–47)
HGB BLD-MCNC: 9.8 G/DL (ref 11.7–15.7)
MCH RBC QN AUTO: 32.1 PG (ref 26.5–33)
MCHC RBC AUTO-ENTMCNC: 33.4 G/DL (ref 31.5–36.5)
MCV RBC AUTO: 96 FL (ref 78–100)
PLATELET # BLD AUTO: 377 10E3/UL (ref 150–450)
POTASSIUM SERPL-SCNC: 3.6 MMOL/L (ref 3.4–5.3)
RBC # BLD AUTO: 3.05 10E6/UL (ref 3.8–5.2)
SODIUM SERPL-SCNC: 141 MMOL/L (ref 135–145)
WBC # BLD AUTO: 8.1 10E3/UL (ref 4–11)

## 2024-11-19 PROCEDURE — 82565 ASSAY OF CREATININE: CPT

## 2024-11-19 PROCEDURE — 80051 ELECTROLYTE PANEL: CPT

## 2024-11-19 PROCEDURE — 250N000013 HC RX MED GY IP 250 OP 250 PS 637: Performed by: HOSPITALIST

## 2024-11-19 PROCEDURE — 80048 BASIC METABOLIC PNL TOTAL CA: CPT

## 2024-11-19 PROCEDURE — 36415 COLL VENOUS BLD VENIPUNCTURE: CPT

## 2024-11-19 PROCEDURE — 250N000013 HC RX MED GY IP 250 OP 250 PS 637: Performed by: INTERNAL MEDICINE

## 2024-11-19 PROCEDURE — 250N000013 HC RX MED GY IP 250 OP 250 PS 637: Performed by: STUDENT IN AN ORGANIZED HEALTH CARE EDUCATION/TRAINING PROGRAM

## 2024-11-19 PROCEDURE — 85027 COMPLETE CBC AUTOMATED: CPT

## 2024-11-19 PROCEDURE — 99239 HOSP IP/OBS DSCHRG MGMT >30: CPT

## 2024-11-19 RX ORDER — ONDANSETRON 4 MG/1
4 TABLET, ORALLY DISINTEGRATING ORAL EVERY 6 HOURS PRN
Qty: 15 TABLET | Refills: 0 | Status: SHIPPED | OUTPATIENT
Start: 2024-11-19

## 2024-11-19 RX ORDER — AMOXICILLIN 250 MG
1 CAPSULE ORAL AT BEDTIME
Qty: 20 TABLET | Refills: 0 | Status: SHIPPED | OUTPATIENT
Start: 2024-11-19

## 2024-11-19 RX ORDER — TRAZODONE HYDROCHLORIDE 50 MG/1
100 TABLET, FILM COATED ORAL AT BEDTIME
COMMUNITY
Start: 2024-11-19

## 2024-11-19 RX ADMIN — POLYETHYLENE GLYCOL 3350 17 G: 17 POWDER, FOR SOLUTION ORAL at 08:17

## 2024-11-19 RX ADMIN — GABAPENTIN 300 MG: 300 CAPSULE ORAL at 08:17

## 2024-11-19 RX ADMIN — PANTOPRAZOLE SODIUM 40 MG: 20 TABLET, DELAYED RELEASE ORAL at 06:35

## 2024-11-19 RX ADMIN — ACETAMINOPHEN 650 MG: 325 TABLET, FILM COATED ORAL at 05:02

## 2024-11-19 RX ADMIN — BISACODYL 10 MG: 10 SUPPOSITORY RECTAL at 08:17

## 2024-11-19 ASSESSMENT — ACTIVITIES OF DAILY LIVING (ADL)
ADLS_ACUITY_SCORE: 0

## 2024-11-19 NOTE — PLAN OF CARE
11/19/24 2:54 PM     Patient discharging home with family. AVS gone over with patient in room and prescriptions given to patient. All Questions answered.     Trinh Velarde RN

## 2024-11-19 NOTE — PLAN OF CARE
Date & Time: 1900-0730.  Summary: Here w/ partial SBO.  Behavior & Aggression: Green - no concerns.  Fall Risk: No.  Orientation:A&Ox4.  ABNL VS/O2:VSS on RA. Denied N/V.  ABNL Labs: see chart.  Pain Management: PRN Tylenol.  Bowel/Bladder: Continent of B/B - active bowel sounds, No BM, passing gas per pt.  IV/Drains: PIV SL.  Wounds/incisions: Noted abdominal mild bruising.  Diet:Low fiber  Activity Level: IND.  Tests/Procedures: N/A.  Anticipated  DC Date: Possibly today sometime?.

## 2024-11-19 NOTE — PROGRESS NOTES
"Surgery Progress Note  11/19/2024       Assessment/Plan:   Mckayla Grady is a 69 year old female who is admitted with small bowel obstruction.  Surgical history notable for pancreaticoduodenectomy in 2019, and recent admission for small bowel obstruction with small bowel resection late October of this year.  A Gastrografin challenge was completed 2 days ago which demonstrated contrast within the colon followed by multiple bowel movements.  Clinically, she is otherwise doing well.  Continues to pass flatus.  Abdominal distention resolving.  No acute events overnight.  She is otherwise doing okay this morning.  No bowel movement yet this morning; however, she is passing flatus without difficulty.  Her abdomen feels significantly better when compared to the previous days.    Low fiber diet as tolerated  Daily bowel regimen, senna/MiraLAX  Discharge later today    Subjective:  No acute events overnight.  She is tired this morning and she not get a significant amount of sleep overnight.  Her abdominal pain continues to improve and is essentially resolved at this point.     Objective:  /64 (BP Location: Right arm)   Pulse 71   Temp 98.2  F (36.8  C) (Oral)   Resp 16   Ht 1.575 m (5' 2\")   Wt 59.9 kg (132 lb)   LMP 05/21/2002   SpO2 97%   BMI 24.14 kg/m      Const: Well nourished, well developed, appears stated age  CV: Warm, well-perfused extremities  RESP: Unlabored respiratory effort  GI: soft, non-tender, non-distended  MSK: No gross deformities appreciated  Skin: Warm, dry. No rashes     Labs:  Recent Labs   Lab 11/19/24  0709 11/18/24  1126 11/15/24  0714   WBC 8.1 11.8* 10.7   HGB 9.8* 9.1* 9.4*    298 391       Recent Labs   Lab 11/19/24  0709 11/18/24  0658 11/15/24  0714    138 139   POTASSIUM 3.6 3.6 3.7   CHLORIDE 104 107 106   CO2 26 26 24   BUN 6.1* 3.7* 10.9   CR 0.46* 0.43* 0.49*   * 125* 75   CARMEN 8.7* 8.3* 8.3*       - - - - - - - - - - - - - - - - - -  Julio Woodward MD "   General Surgery, PGY-4  General Surgery and Acute Care Surgery  Please message via [employer] secure chat with questions

## 2024-11-19 NOTE — PLAN OF CARE
Goal Outcome Evaluation:    Date & Time: 11/18/24 ([id number])  Summary:   Orientation: A&O x4  ABNL VS/O2: VSS on RA  ABNL Labs: WBC 11.8  Pain Management: tylenol prn  Bowel/Bladder:  voiding spont.  Drains: PIV SL  Wounds/incisions: old abd inc  Diet: low fiber diet, tolerating well   Activity Level:  independent   Anticipated  DC Date: home 11/19  Significant Information: ambulating frequently in [place]. Suppository given, had a loose BM this shift.

## 2024-11-20 ENCOUNTER — PATIENT OUTREACH (OUTPATIENT)
Dept: CARE COORDINATION | Facility: CLINIC | Age: 69
End: 2024-11-20
Payer: COMMERCIAL

## 2024-11-20 NOTE — DISCHARGE SUMMARY
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 - s/p laparotomy, EKATERINA, and SB resection (10/27/2024 at [facility])  H/o ampullary carcinoma s/p whipple (2019)  Nausea secondary to above, resolved   Chest pain, suspect musculoskeletal/pleuritis/costochondritis, resolved   Chronic spine/disc disease with chronic low back pain  Anemia, suspect chronic and/or dilutional component, improving   Leukocytosis, suspect reactive, resolved  GERD   Depression/anxiety  Insomnia    Clinically Significant Risk Factors          Follow-ups Needed After Discharge   Follow-up Appointments       Follow-up and recommended labs and tests       Follow up with primary care provider, Karlie Watkins, within 7 days for hospital follow-up.  The following labs/tests are recommended: CBC and BMP.              Discharge Disposition   Discharged to home  Condition at discharge: Stable    Hospital Course   Mckayla Grady is a 69 year old female with history including recent small bowel obstruction with admission to [facility] (10/22/2024-11/5/2024) where she underwent surgery including SB resection (10/27/2024); h/o ampullary carcinoma status post Whipple (2019), Gemzar and Xeloda ; among others, who presented 11/14/2024 for further evaluation of abdominal pain.     From H&P:   \"Presented to the ED with 3 days of generalized abdominal pain. Pain has been 9/10 with associated nausea and vomiting. She denies fevers or chills. Has not had bowel movement in at least 24 hours and does not recall last time she passed gas. Some difficulty urinating but otherwise no issues. She denies shortness of breath, cough, chest pain, or " "palpitations ... During recent admission at [facility], general surgery was following and patient was trialed with conservative management with NG-after 5 days without improvement she had diagnostic laparoscopy, EKATERINA, small bowel resection due to stricture on 10/27. She was on TPN and passed NG clamp with removal on 11/3. She was discharged on 11/5 with 2 weeks of miralax prn and low fiber diet.\"     On initial evaluation, pt was AF, VSS. Labs notable for CBC with WBC 13.6, Hgb 11.2, platelets 533K; BMP normal; LFT's normal, lipase normal. CT abdomen/pelvis with contrast showed focally dilated anterior lower abdominal small bowel loop with apparent transition point in the mid abdomen, where there was tethering of bowel loops, slight mesenteric swirling and mesenteric congestion concerning for obstruction, possibly from adhesion rather than internal hernia; there was surrounding inflammation and trace interloop fluid, no pneumatosis or free air; pelvic mesenteric fluid collection measuring 3.2 x 4.3 cm with peripheral enhancement, and as patient noted to have had bowel surgery 10/2024, differential could reflect postoperative fluid collection versus developing abscess.      Partial small bowel obstruction  Suspect post-op pelvic fluid collection  Recent SBO due to stricture - s/p laparotomy, EKATERINA, and SB resection (10/27/2024 at [facility])  H/o ampullary carcinoma s/p whipple (2019)  Nausea secondary to above, resolved   *Patient follows with [facility] for surveillance with recent imaging showing possible 3.4 cm masslike region. Follow-up abdominal MRI showed without evidence of malignancy. H/o whipple for ampullary carcinoma completed in 2019 with Dr. Christie at [place].   *Recent hospitalization for SBO at [facility] between 10/22-11/05 requiring surgery including small bowel resection (10/27). Required TPN after surgery.  *Initial presentation as above. Bowel sounds hypoactive in ED. Started on NPO and IVF's on admit. " Surgery consulted on admission.  *11/15: Suppository ordered by surgery with some response. Bowel sounds still hypoactive.  *11/16: More pain overnight. Slightly more bowel sounds, but still hypoactive. Gastrografin challenge ordered by Surgery. AXR showed no evidence of bowel obstruction; enteric contrast material that was administered present throughout a nondilated colon, all the way to the rectum.   *11/17: Several loose stools after gastrografin. More bowel sounds. XR demonstrated contrast in colon and non obstructive bowel pattern. Intermittent pain with RLQ noted to be most significant source of pain.   *11/18: No bowel movement since immediately following gastografin challenge despite bowel regimen. Enema added to regimen. Several loose, watery stools following enema. Diet advanced to full liquid and patient tolerating.   *11/19: Diet advanced to regular, low fiber diet and patient tolerating. No bowel movement although passing flatus.   *11/20: General surgery recommending discharge. Patient continued to tolerate diet with no complaints. Abdominal pain continued to feel improved with minimal pain. She continued to pass flatus without difficulty and had two stools prior to discharge.  - General Surgery consulted throughout hospitalization.   - Patient discharged on low fiber diet. Education from nutrition completed prior to discharge. Discussed continued use of acetaminophen as needed for pain and bowel regimen upon discharge. She was also discharged with small amount of Zofran as needed. Follow-up PCP within seven days for further evaluation and management discussed as well as close follow-up with general surgery team at [facility] as needed. Return precautions discussed.      Chest pain, suspect musculoskeletal/pleuritis/costochondritis, resolved   *11/17: Pt c/o pleuritic lower sternal/epigastric pain, reproducible with palpation.  - Received one dose of IV ketorolac 15 mg x1 11/17. Treated with PRN ibuprofen  600 mg TID 11/17. Counseled on limited course of treatment with ibuprofen. Pain had improved and patient only required one dose of ibuprofen during her stay.      Chronic spine/disc disease with chronic low back pain  *PTA gabapentin held on admit given pSBO.  *On 11/17, pt c/o worsened pain upon admission although this improved prior to discharge and was per baseline according to patient.  - Restarted PTA gabapentin 11/17. Continued PTA gabapentin and PRN tylenol upon discharge.     Anemia, suspect chronic and/or dilutional component, improving   * Hgb 11.2 on admit. Hgb 9.1 11/18. No overt clinical signs of major bleeding.   - Discussed recommendation to follow-up with PCP within one week upon discharge for continued monitoring.     Leukocytosis, suspect reactive, resolved  * WBC 13.6 on admit. Resolved although elevated again following gastrogafin challenge WBC 11.8 11/18. This resolved once again day of discharge. Patient afebrile with no clear signs of infection throughout hospitalization.   - Discussed recommendation to follow-up with PCP within one week upon discharge for continued monitoring.    GERD  * Started on IV pantoprazole on admit given suspected NSAID use although given patient did not require more than one dose of ibuprofen, pantoprazole discontinued and PTA omeprazole resumed upon discharge.    Depression/anxiety  Insomnia  - Continued PTA trazodone at bedtime and lorazepam PRN upon discharge.    Consultations This Hospital Stay   SURGERY GENERAL IP CONSULT  NUTRITION SERVICES ADULT IP CONSULT    Code Status   Prior    Time Spent on this Encounter   I, Leti Gagnon PA-C, personally saw the patient today and spent greater than 30 minutes discharging this patient. Patient discussed with attending physician Dr. Padilla.     Leti Gagnon PA-C  [facility] GENERAL SURGERY  [address] [place] [id number]  Phone: [phone]  Fax:  [phone]  ______________________________________________________________________    Physical Exam   Vital Signs:                    Weight: 132 lbs 0 oz    GENERAL:  Pleasant, cooperative, alert. Sitting in chair upon examination in no acute distress.   HEENT: Normocephalic, atraumatic.     PULMONOLOGY: Clear to auscultation bilaterally.  CARDIAC: Regular rate and rhythm.    ABDOMEN: Soft, non distended. Minimal tenderness to palpation RLQ. No rebound or guarding. BS+ all four quadrants. Incisions healing well.   MUSCULOSKELETAL:  Moving x 4 spontaneously with CMS intact x4.  Normal bulk and tone.  No LE edema.  Radial pulses 2+ bilaterally.    NEURO: Alert and oriented x3. Nonfocal exam.        Primary Care Physician   Karlie Watkins    Discharge Orders      Reason for your hospital stay    You were hospitalized for further evaluation of abdominal pain. You were found to have a small bowel obstruction that resolved following a Gastrografin challenge.     Follow-up and recommended labs and tests     Follow up with primary care provider, Karlie Watkins, within 7 days for hospital follow-up.  The following labs/tests are recommended: CBC and BMP.     Discharge Instructions     Activity    ACTIVITY:  Light Activity -- you may immediately be up and about as tolerated.  Driving -- you may drive when comfortable and off narcotic pain medications.   Strenuous Work/Activity -- limit lifting to 15 pounds for 1-2 weeks.  Then, progressively increase with time.  Active Sports (running, biking, etc.) -- cautiously resume when cleared by your surgeon.     Discharge Instructions    Expect gradual improvement of residual abdominal soreness as your bowel function continues to return to normal over the following 1-2 weeks. You may take acetaminophen (Tylenol) 1000 mg by mouth up to 3 times daily as needed for pain. Continue to manage your bowel regimen with daily miralax and senna as needed for constipation. Maintain  a low fiber diet upon discharge. Contact your general surgery team or represent to the ED with worsening of abdominal pain, recurrence of nausea/vomiting, you develop a fever of 100.4 F (38 C) or higher or you are unable to have a bowel movement or pass gas.     Diet    Follow this diet upon discharge: Current Diet:Orders Placed This Encounter      Low Fiber Diet       Significant Results and Procedures   Most Recent 3 CBC's:  Recent Labs   Lab Test 11/19/24  0709 11/18/24  1126 11/15/24  0714   WBC 8.1 11.8* 10.7   HGB 9.8* 9.1* 9.4*   MCV 96 95 99    298 391     Most Recent 3 BMP's:  Recent Labs   Lab Test 11/19/24  0709 11/18/24  0658 11/15/24  0714    138 139   POTASSIUM 3.6 3.6 3.7   CHLORIDE 104 107 106   CO2 26 26 24   BUN 6.1* 3.7* 10.9   CR 0.46* 0.43* 0.49*   ANIONGAP 11 5* 9   CARMEN 8.7* 8.3* 8.3*   * 125* 75     Most Recent 2 LFT's:  Recent Labs   Lab Test 11/14/24  0812 08/26/19  1309   AST 15 43   ALT 12 47   ALKPHOS 143 225*   BILITOTAL 0.4 1.3     Most Recent 3 Creatinines:  Recent Labs   Lab Test 11/19/24  0709 11/18/24  0658 11/15/24  0714   CR 0.46* 0.43* 0.49*     Most Recent 3 Hemoglobins:  Recent Labs   Lab Test 11/19/24  0709 11/18/24  1126 11/15/24  0714   HGB 9.8* 9.1* 9.4*     Most Recent ESR & CRP:  Recent Labs   Lab Test 07/10/19  0830   SED 16   ,   Results for orders placed or performed during the hospital encounter of 11/14/24   CT Abdomen Pelvis w Contrast    Narrative    CT ABDOMEN PELVIS W CONTRAST 11/14/2024 9:25 AM    CLINICAL HISTORY: Abdominal pain, epigastric, nausea and vomiting,  history of SBO in October, please evaluate for recurrence.    TECHNIQUE: CT scan of the abdomen and pelvis was performed following  injection of IV contrast. Multiplanar reformats were obtained. Dose  reduction techniques were used.    CONTRAST: 64mL Isovue 370    COMPARISON: MR abdomen 10/21/2024. CT chest, abdomen and pelvis  10/14/2024.    FINDINGS:   LOWER CHEST:  Unremarkable.    HEPATOBILIARY: No new suspicious lesion. Similar geographic  hypoenhancement within the anterior right hepatic lobe (4/48), which  demonstrated delayed postcontrast enhancement on the prior MRI and  could reflect sequelae of scar given the mild capsular retraction in  this region. Cholecystectomy and similar mild biliary ductal  dilatation.    PANCREAS: Whipple procedure. Residual pancreas appears unremarkable.    SPLEEN: Unremarkable.    ADRENAL GLANDS: No significant nodules.    KIDNEYS: No significant mass, stones, or hydronephrosis.    BOWEL: There appears to be tethering of bowel within the mid lower  abdomen (5/23), dilated distal ileum measuring up to 3.1 cm (4/107)  and adjacent transition point. There is surrounding moderate  inflammation, slight twisting of the mesentery and mesenteric  congestion. Lower pelvic mesenteric collection of fluid with  peripheral enhancement measuring 3.2 x 4.3 cm (4/115). Trace interloop  and pelvic fluid. No pneumatosis or free air.    VASCULATURE: Nonaneurysmal abdominal aorta.    LYMPH NODE AND PERITONEUM: No enlarged lymph node.    PELVIS: No pelvic mass.    MUSCULOSKELETAL: Lumbar spine fusion hardware. No aggressive osseous  lesion. Degenerative changes of the spine. Trace left abdominal  extraperitoneal gas is likely postsurgical.    OTHER: None.      Impression    IMPRESSION:   1.  Focally dilated anterior lower abdominal small bowel loop with  apparent transition point in the mid abdomen, where there is tethering  of bowel loops, slight mesenteric swirling and mesenteric congestion  concerning for obstruction, possibly from adhesion rather than  internal hernia. There is surrounding inflammation and trace interloop  fluid. No pneumatosis or free air. Recommend surgical consultation.  2.  Pelvic mesenteric fluid collection measuring 3.2 x 4.3 cm with  peripheral enhancement. Reportedly, patient had bowel surgery 10/2024.  Differential could reflect  postoperative fluid collection versus  developing abscess.     ANTHONY LITTLEJOHN MD         SYSTEM ID:  [id number]   XR Abdomen 1 View    Narrative    EXAM: ABDOMEN SINGLE VIEW  LOCATION: [facility]  DATE: 11/16/2024    INDICATION: Follow-up oral Gastrografin challenge for small bowel obstruction.  COMPARISON: 11/14/2024 at 0923 hours - CT abdomen and pelvis.    FINDINGS: No visualized dilated small or large bowel. Enteric contrast material that has been administered is present throughout a nondilated colon, all the way to the rectum. No visualized bowel wall thickening or pneumatosis. Fusion hardware in the   lower lumbar spine.      Impression    IMPRESSION: No evidence of bowel obstruction. Enteric contrast material that was administered is present throughout a nondilated colon, all the way to the rectum.        Discharge Medications   Discharge Medication List as of 11/19/2024  2:09 PM        START taking these medications    Details   ondansetron (ZOFRAN ODT) 4 MG ODT tab Take 1 tablet (4 mg) by mouth every 6 hours as needed for nausea or vomiting., Disp-15 tablet, R-0, E-Prescribe      senna-docusate (SENOKOT-S/PERICOLACE) 8.6-50 MG tablet Take 1 tablet by mouth at bedtime., Disp-20 tablet, R-0, E-PrescribeTake 1 to 2 tablets up to twice daily as needed for constipation. Hold for loose stools.           CONTINUE these medications which have CHANGED    Details   traZODone (DESYREL) 50 MG tablet Take 2 tablets (100 mg) by mouth at bedtime., Historical           CONTINUE these medications which have NOT CHANGED    Details   albuterol (PROAIR HFA/PROVENTIL HFA/VENTOLIN HFA) 108 (90 Base) MCG/ACT inhaler Inhale 2 puffs into the lungs every 4 hours as needed for shortness of breath / dyspnea or wheezing, Disp-1 Inhaler, R-3, E-Prescribe      estradiol (ESTRACE) 0.1 MG/GM vaginal cream Place vaginally twice a week. On Mondays and FridaysHistorical      Ferrous Sulfate 27 MG TABS Take 1  tablet by mouth daily., Historical      !! gabapentin (NEURONTIN) 300 MG capsule Take 300 mg by mouth 2 times daily. Morning and midday, Historical      !! gabapentin (NEURONTIN) 300 MG capsule Take 600 mg by mouth at bedtime., Historical      LORazepam (ATIVAN) 1 MG tablet Take 1 mg by mouth daily as needed., Historical      multivitamin w/minerals (THERA-VIT-M) tablet Take 1 tablet by mouth daily, Historical      omega 3 1000 MG CAPS Take 1 capsule by mouth daily., Historical      omeprazole (PRILOSEC) 20 MG DR capsule Take 20 mg by mouth daily as needed., Historical      polyethylene glycol (MIRALAX) 17 g packet Take 1 packet by mouth daily as needed for constipation., Historical      vitamin D3 (CHOLECALCIFEROL) 125 MCG (5000 UT) tablet Take 5,000 Units by mouth daily., Historical      clobetasol (TEMOVATE) 0.05 % external ointment Apply topically 2 times daily as needed.Historical       !! - Potential duplicate medications found. Please discuss with provider.        Allergies   Allergies   Allergen Reactions    Ampicillin GI Disturbance

## 2024-11-20 NOTE — PROGRESS NOTES
[facility]: Transitions of Care Outreach  Chief Complaint   Patient presents with    Clinic Care Coordination - Post Hospital       Most Recent Admission Date: 11/14/2024   Most Recent Admission Diagnosis: Small bowel obstruction (H) - K56.609  Pelvic fluid collection - R18.8  Leukocytosis, unspecified type - D72.829     Most Recent Discharge Date: 11/19/2024   Most Recent Discharge Diagnosis: Small bowel obstruction (H) - K56.609  Pelvic fluid collection - R18.8  Leukocytosis, unspecified type - D72.829  Psychophysiological insomnia - F51.04  Nausea - R11.0  Constipation, unspecified constipation type - K59.00     Transitions of Care Assessment    Discharge Assessment  How are you doing now that you are home?: I am doing okay. I got up and showered and ate breakfast. I slept good. Patient said she had a bowel movement before discharging from the hospital yesterday but has not had one yet today.  How are your symptoms? (Red Flag symptoms escalate to triage hotline per guidelines): Improved  Do you know how to contact your clinic care team if you have future questions or changes to your health status? : Yes  Does the patient have their discharge instructions? : Yes  Does the patient have questions regarding their discharge instructions? : No  Were you started on any new medications or were there changes to any of your previous medications? : Yes  Does the patient have all of their medications?: Yes  Do you have questions regarding any of your medications? : No                  Follow up Plan     Discharge Follow-Up  Discharge follow up appointment scheduled in alignment with recommended follow up timeframe or Transitions of Risk Category? (Low = within 30 days; Moderate= within 14 days; High= within 7 days): Yes  Discharge Follow Up Appointment Scheduled with?: Primary Care Provider    No future appointments.    Outpatient Plan as outlined on AVS reviewed with patient.    For any urgent  concerns, please contact our 24 hour nurse triage line: [phone] ([phone])       VINCENT Minor  [phone]  [facility]

## 2024-11-26 ENCOUNTER — TRANSFERRED RECORDS (OUTPATIENT)
Dept: HEALTH INFORMATION MANAGEMENT | Facility: CLINIC | Age: 69
End: 2024-11-26
Payer: COMMERCIAL

## 2024-12-27 ENCOUNTER — HOSPITAL ENCOUNTER (OUTPATIENT)
Dept: MAMMOGRAPHY | Facility: CLINIC | Age: 69
Discharge: HOME | End: 2024-12-27
Attending: FAMILY MEDICINE | Admitting: FAMILY MEDICINE
Payer: COMMERCIAL

## 2024-12-27 DIAGNOSIS — Z12.31 SCREENING MAMMOGRAM, ENCOUNTER FOR: ICD-10-CM

## 2024-12-27 PROCEDURE — 77063 BREAST TOMOSYNTHESIS BI: CPT

## 2024-12-27 PROCEDURE — 77067 SCR MAMMO BI INCL CAD: CPT

## 2025-06-03 PROCEDURE — 99285 EMERGENCY DEPT VISIT HI MDM: CPT | Mod: 25

## 2025-06-04 ENCOUNTER — APPOINTMENT (OUTPATIENT)
Dept: GENERAL RADIOLOGY | Facility: CLINIC | Age: 70
End: 2025-06-04
Attending: HOSPITALIST
Payer: COMMERCIAL

## 2025-06-04 ENCOUNTER — HOSPITAL ENCOUNTER (INPATIENT)
Facility: CLINIC | Age: 70
End: 2025-06-04
Attending: EMERGENCY MEDICINE | Admitting: INTERNAL MEDICINE
Payer: COMMERCIAL

## 2025-06-04 ENCOUNTER — APPOINTMENT (OUTPATIENT)
Dept: CT IMAGING | Facility: CLINIC | Age: 70
End: 2025-06-04
Attending: EMERGENCY MEDICINE
Payer: COMMERCIAL

## 2025-06-04 DIAGNOSIS — K56.609 SMALL BOWEL OBSTRUCTION (H): ICD-10-CM

## 2025-06-04 LAB
ALBUMIN SERPL BCG-MCNC: 4.2 G/DL (ref 3.5–5.2)
ALBUMIN UR-MCNC: NEGATIVE MG/DL
ALP SERPL-CCNC: 93 U/L (ref 40–150)
ALT SERPL W P-5'-P-CCNC: 28 U/L (ref 0–50)
ANION GAP SERPL CALCULATED.3IONS-SCNC: 13 MMOL/L (ref 7–15)
APPEARANCE UR: CLEAR
AST SERPL W P-5'-P-CCNC: 30 U/L (ref 0–45)
BASOPHILS # BLD AUTO: 0 10E3/UL (ref 0–0.2)
BASOPHILS NFR BLD AUTO: 0 %
BILIRUB SERPL-MCNC: 0.4 MG/DL
BILIRUB UR QL STRIP: NEGATIVE
BUN SERPL-MCNC: 14.9 MG/DL (ref 8–23)
CALCIUM SERPL-MCNC: 9.2 MG/DL (ref 8.8–10.4)
CHLORIDE SERPL-SCNC: 105 MMOL/L (ref 98–107)
COLOR UR AUTO: YELLOW
CREAT SERPL-MCNC: 0.62 MG/DL (ref 0.51–0.95)
EGFRCR SERPLBLD CKD-EPI 2021: >90 ML/MIN/1.73M2
EOSINOPHIL # BLD AUTO: 0.1 10E3/UL (ref 0–0.7)
EOSINOPHIL NFR BLD AUTO: 1 %
ERYTHROCYTE [DISTWIDTH] IN BLOOD BY AUTOMATED COUNT: 11.4 % (ref 10–15)
GLUCOSE SERPL-MCNC: 113 MG/DL (ref 70–99)
GLUCOSE UR STRIP-MCNC: NEGATIVE MG/DL
HCO3 SERPL-SCNC: 24 MMOL/L (ref 22–29)
HCT VFR BLD AUTO: 38.1 % (ref 35–47)
HGB BLD-MCNC: 13.2 G/DL (ref 11.7–15.7)
HGB UR QL STRIP: NEGATIVE
IMM GRANULOCYTES # BLD: 0 10E3/UL
IMM GRANULOCYTES NFR BLD: 0 %
KETONES UR STRIP-MCNC: NEGATIVE MG/DL
LACTATE SERPL-SCNC: 0.6 MMOL/L (ref 0.7–2)
LEUKOCYTE ESTERASE UR QL STRIP: NEGATIVE
LIPASE SERPL-CCNC: 20 U/L (ref 13–60)
LYMPHOCYTES # BLD AUTO: 1.7 10E3/UL (ref 0.8–5.3)
LYMPHOCYTES NFR BLD AUTO: 15 %
MCH RBC QN AUTO: 33.2 PG (ref 26.5–33)
MCHC RBC AUTO-ENTMCNC: 34.6 G/DL (ref 31.5–36.5)
MCV RBC AUTO: 96 FL (ref 78–100)
MONOCYTES # BLD AUTO: 0.9 10E3/UL (ref 0–1.3)
MONOCYTES NFR BLD AUTO: 8 %
MUCOUS THREADS #/AREA URNS LPF: PRESENT /LPF
NEUTROPHILS # BLD AUTO: 8.2 10E3/UL (ref 1.6–8.3)
NEUTROPHILS NFR BLD AUTO: 75 %
NITRATE UR QL: NEGATIVE
NRBC # BLD AUTO: 0 10E3/UL
NRBC BLD AUTO-RTO: 0 /100
PH UR STRIP: 7 [PH] (ref 5–7)
PLATELET # BLD AUTO: 219 10E3/UL (ref 150–450)
POTASSIUM SERPL-SCNC: 3.9 MMOL/L (ref 3.4–5.3)
PROT SERPL-MCNC: 6.9 G/DL (ref 6.4–8.3)
RBC # BLD AUTO: 3.97 10E6/UL (ref 3.8–5.2)
RBC URINE: 1 /HPF
SODIUM SERPL-SCNC: 142 MMOL/L (ref 135–145)
SP GR UR STRIP: 1.01 (ref 1–1.03)
UROBILINOGEN UR STRIP-MCNC: NORMAL MG/DL
WBC # BLD AUTO: 10.8 10E3/UL (ref 4–11)
WBC URINE: 2 /HPF

## 2025-06-04 PROCEDURE — 85025 COMPLETE CBC W/AUTO DIFF WBC: CPT | Performed by: EMERGENCY MEDICINE

## 2025-06-04 PROCEDURE — 36415 COLL VENOUS BLD VENIPUNCTURE: CPT | Performed by: EMERGENCY MEDICINE

## 2025-06-04 PROCEDURE — 250N000013 HC RX MED GY IP 250 OP 250 PS 637: Performed by: INTERNAL MEDICINE

## 2025-06-04 PROCEDURE — 250N000009 HC RX 250: Performed by: EMERGENCY MEDICINE

## 2025-06-04 PROCEDURE — 99222 1ST HOSP IP/OBS MODERATE 55: CPT | Performed by: INTERNAL MEDICINE

## 2025-06-04 PROCEDURE — 99207 PR APP CREDIT; MD BILLING SHARED VISIT: CPT | Performed by: HOSPITALIST

## 2025-06-04 PROCEDURE — 81001 URINALYSIS AUTO W/SCOPE: CPT | Performed by: INTERNAL MEDICINE

## 2025-06-04 PROCEDURE — 250N000011 HC RX IP 250 OP 636: Performed by: EMERGENCY MEDICINE

## 2025-06-04 PROCEDURE — 250N000013 HC RX MED GY IP 250 OP 250 PS 637: Performed by: HOSPITALIST

## 2025-06-04 PROCEDURE — 83690 ASSAY OF LIPASE: CPT | Performed by: EMERGENCY MEDICINE

## 2025-06-04 PROCEDURE — 96374 THER/PROPH/DIAG INJ IV PUSH: CPT | Mod: 59

## 2025-06-04 PROCEDURE — 999N000065 XR ABDOMEN PORT 1 VIEW

## 2025-06-04 PROCEDURE — 99222 1ST HOSP IP/OBS MODERATE 55: CPT | Performed by: PHYSICIAN ASSISTANT

## 2025-06-04 PROCEDURE — 258N000003 HC RX IP 258 OP 636: Performed by: EMERGENCY MEDICINE

## 2025-06-04 PROCEDURE — 83605 ASSAY OF LACTIC ACID: CPT | Performed by: EMERGENCY MEDICINE

## 2025-06-04 PROCEDURE — 250N000011 HC RX IP 250 OP 636: Performed by: INTERNAL MEDICINE

## 2025-06-04 PROCEDURE — 80053 COMPREHEN METABOLIC PANEL: CPT | Performed by: EMERGENCY MEDICINE

## 2025-06-04 PROCEDURE — 96375 TX/PRO/DX INJ NEW DRUG ADDON: CPT

## 2025-06-04 PROCEDURE — 258N000003 HC RX IP 258 OP 636: Performed by: INTERNAL MEDICINE

## 2025-06-04 PROCEDURE — 85004 AUTOMATED DIFF WBC COUNT: CPT | Performed by: EMERGENCY MEDICINE

## 2025-06-04 PROCEDURE — 96361 HYDRATE IV INFUSION ADD-ON: CPT

## 2025-06-04 PROCEDURE — 120N000001 HC R&B MED SURG/OB

## 2025-06-04 PROCEDURE — 74177 CT ABD & PELVIS W/CONTRAST: CPT

## 2025-06-04 RX ORDER — NALOXONE HYDROCHLORIDE 0.4 MG/ML
0.4 INJECTION, SOLUTION INTRAMUSCULAR; INTRAVENOUS; SUBCUTANEOUS
Status: ACTIVE | OUTPATIENT
Start: 2025-06-04

## 2025-06-04 RX ORDER — AMOXICILLIN 250 MG
2 CAPSULE ORAL 2 TIMES DAILY PRN
Status: ACTIVE | OUTPATIENT
Start: 2025-06-04

## 2025-06-04 RX ORDER — CALCIUM CARBONATE 500 MG/1
1000 TABLET, CHEWABLE ORAL 4 TIMES DAILY PRN
Status: ACTIVE | OUTPATIENT
Start: 2025-06-04

## 2025-06-04 RX ORDER — NALOXONE HYDROCHLORIDE 0.4 MG/ML
0.2 INJECTION, SOLUTION INTRAMUSCULAR; INTRAVENOUS; SUBCUTANEOUS
Status: ACTIVE | OUTPATIENT
Start: 2025-06-04

## 2025-06-04 RX ORDER — HYDROMORPHONE HYDROCHLORIDE 1 MG/ML
0.3 INJECTION, SOLUTION INTRAMUSCULAR; INTRAVENOUS; SUBCUTANEOUS
Refills: 0 | Status: DISCONTINUED | OUTPATIENT
Start: 2025-06-04 | End: 2025-06-04

## 2025-06-04 RX ORDER — IOPAMIDOL 755 MG/ML
66 INJECTION, SOLUTION INTRAVASCULAR ONCE
Status: COMPLETED | OUTPATIENT
Start: 2025-06-04 | End: 2025-06-04

## 2025-06-04 RX ORDER — CARBOXYMETHYLCELLULOSE SODIUM 5 MG/ML
1 SOLUTION/ DROPS OPHTHALMIC
Status: DISPENSED | OUTPATIENT
Start: 2025-06-04

## 2025-06-04 RX ORDER — HYDROMORPHONE HYDROCHLORIDE 1 MG/ML
0.5 INJECTION, SOLUTION INTRAMUSCULAR; INTRAVENOUS; SUBCUTANEOUS
Refills: 0 | Status: DISCONTINUED | OUTPATIENT
Start: 2025-06-04 | End: 2025-06-04

## 2025-06-04 RX ORDER — METHOCARBAMOL 500 MG/1
500 TABLET, FILM COATED ORAL 4 TIMES DAILY
Status: DISPENSED | OUTPATIENT
Start: 2025-06-04

## 2025-06-04 RX ORDER — HYDROMORPHONE HYDROCHLORIDE 1 MG/ML
0.3 INJECTION, SOLUTION INTRAMUSCULAR; INTRAVENOUS; SUBCUTANEOUS
Status: ACTIVE | OUTPATIENT
Start: 2025-06-04

## 2025-06-04 RX ORDER — GABAPENTIN 300 MG/1
600 CAPSULE ORAL AT BEDTIME
Status: DISCONTINUED | OUTPATIENT
Start: 2025-06-04 | End: 2025-06-04

## 2025-06-04 RX ORDER — LIDOCAINE HYDROCHLORIDE 20 MG/ML
5 SOLUTION OROPHARYNGEAL ONCE
Status: COMPLETED | OUTPATIENT
Start: 2025-06-04 | End: 2025-06-04

## 2025-06-04 RX ORDER — HYDROMORPHONE HCL IN WATER/PF 6 MG/30 ML
0.2 PATIENT CONTROLLED ANALGESIA SYRINGE INTRAVENOUS
Status: ACTIVE | OUTPATIENT
Start: 2025-06-04

## 2025-06-04 RX ORDER — LORAZEPAM 0.5 MG/1
0.25 TABLET ORAL 2 TIMES DAILY PRN
Status: ACTIVE | OUTPATIENT
Start: 2025-06-04

## 2025-06-04 RX ORDER — GABAPENTIN 300 MG/1
600 CAPSULE ORAL 2 TIMES DAILY
Status: DISPENSED | OUTPATIENT
Start: 2025-06-04

## 2025-06-04 RX ORDER — AMOXICILLIN 250 MG
1 CAPSULE ORAL 2 TIMES DAILY PRN
Status: ACTIVE | OUTPATIENT
Start: 2025-06-04

## 2025-06-04 RX ORDER — ONDANSETRON 2 MG/ML
4 INJECTION INTRAMUSCULAR; INTRAVENOUS ONCE
Status: COMPLETED | OUTPATIENT
Start: 2025-06-04 | End: 2025-06-04

## 2025-06-04 RX ORDER — LIDOCAINE 40 MG/G
CREAM TOPICAL
Status: ACTIVE | OUTPATIENT
Start: 2025-06-04

## 2025-06-04 RX ORDER — SODIUM CHLORIDE 9 MG/ML
INJECTION, SOLUTION INTRAVENOUS CONTINUOUS
Status: ACTIVE | OUTPATIENT
Start: 2025-06-04

## 2025-06-04 RX ORDER — TRAZODONE HYDROCHLORIDE 100 MG/1
100 TABLET ORAL AT BEDTIME
Status: DISCONTINUED | OUTPATIENT
Start: 2025-06-04 | End: 2025-06-04

## 2025-06-04 RX ORDER — ONDANSETRON 2 MG/ML
4 INJECTION INTRAMUSCULAR; INTRAVENOUS EVERY 6 HOURS PRN
Status: ACTIVE | OUTPATIENT
Start: 2025-06-04

## 2025-06-04 RX ORDER — ONDANSETRON 4 MG/1
4 TABLET, ORALLY DISINTEGRATING ORAL EVERY 6 HOURS PRN
Status: ACTIVE | OUTPATIENT
Start: 2025-06-04

## 2025-06-04 RX ORDER — GABAPENTIN 300 MG/1
300 CAPSULE ORAL 2 TIMES DAILY
Status: DISCONTINUED | OUTPATIENT
Start: 2025-06-04 | End: 2025-06-04

## 2025-06-04 RX ADMIN — CARBOXYMETHYLCELLULOSE SODIUM 1 DROP: 5 SOLUTION/ DROPS OPHTHALMIC at 23:09

## 2025-06-04 RX ADMIN — ONDANSETRON 4 MG: 2 INJECTION, SOLUTION INTRAMUSCULAR; INTRAVENOUS at 00:23

## 2025-06-04 RX ADMIN — SODIUM CHLORIDE: 0.9 INJECTION, SOLUTION INTRAVENOUS at 13:45

## 2025-06-04 RX ADMIN — GABAPENTIN 600 MG: 300 CAPSULE ORAL at 20:43

## 2025-06-04 RX ADMIN — TRAZODONE HYDROCHLORIDE 150 MG: 50 TABLET ORAL at 23:08

## 2025-06-04 RX ADMIN — SODIUM CHLORIDE: 0.9 INJECTION, SOLUTION INTRAVENOUS at 00:17

## 2025-06-04 RX ADMIN — HYDROMORPHONE HYDROCHLORIDE 0.5 MG: 1 INJECTION, SOLUTION INTRAMUSCULAR; INTRAVENOUS; SUBCUTANEOUS at 04:02

## 2025-06-04 RX ADMIN — IOPAMIDOL 66 ML: 755 INJECTION, SOLUTION INTRAVENOUS at 00:56

## 2025-06-04 RX ADMIN — SODIUM CHLORIDE 60 ML: 9 INJECTION, SOLUTION INTRAVENOUS at 00:56

## 2025-06-04 RX ADMIN — GABAPENTIN 300 MG: 300 CAPSULE ORAL at 08:42

## 2025-06-04 RX ADMIN — PANTOPRAZOLE SODIUM 40 MG: 40 INJECTION, POWDER, FOR SOLUTION INTRAVENOUS at 06:38

## 2025-06-04 RX ADMIN — HYDROMORPHONE HYDROCHLORIDE 1 MG: 1 INJECTION, SOLUTION INTRAMUSCULAR; INTRAVENOUS; SUBCUTANEOUS at 00:25

## 2025-06-04 RX ADMIN — METHOCARBAMOL 500 MG: 500 TABLET ORAL at 23:09

## 2025-06-04 RX ADMIN — SODIUM CHLORIDE: 0.9 INJECTION, SOLUTION INTRAVENOUS at 04:34

## 2025-06-04 RX ADMIN — SODIUM CHLORIDE: 0.9 INJECTION, SOLUTION INTRAVENOUS at 23:12

## 2025-06-04 ASSESSMENT — ACTIVITIES OF DAILY LIVING (ADL)
ADLS_ACUITY_SCORE: 54
ADLS_ACUITY_SCORE: 62
ADLS_ACUITY_SCORE: 59
ADLS_ACUITY_SCORE: 59
ADLS_ACUITY_SCORE: 54
ADLS_ACUITY_SCORE: 55
ADLS_ACUITY_SCORE: 54
ADLS_ACUITY_SCORE: 62
ADLS_ACUITY_SCORE: 59
ADLS_ACUITY_SCORE: 54
ADLS_ACUITY_SCORE: 62
ADLS_ACUITY_SCORE: 54
ADLS_ACUITY_SCORE: 55
ADLS_ACUITY_SCORE: 55

## 2025-06-04 ASSESSMENT — COLUMBIA-SUICIDE SEVERITY RATING SCALE - C-SSRS
2. HAVE YOU ACTUALLY HAD ANY THOUGHTS OF KILLING YOURSELF IN THE PAST MONTH?: NO
6. HAVE YOU EVER DONE ANYTHING, STARTED TO DO ANYTHING, OR PREPARED TO DO ANYTHING TO END YOUR LIFE?: NO
1. IN THE PAST MONTH, HAVE YOU WISHED YOU WERE DEAD OR WISHED YOU COULD GO TO SLEEP AND NOT WAKE UP?: NO

## 2025-06-04 NOTE — PLAN OF CARE
Goal Outcome Evaluation:    Patient up with SBA, ambulated in hallway. Voiding adequately in BR. NG placed today, connected to LIS. Patient denies nausea/vomiting/pain.

## 2025-06-04 NOTE — ED TRIAGE NOTES
"Lower abdominal pain with some vomiting that started earlier tonight. Previous history of bowel obstructions. She did \"force myself to have a bowel movement earlier tonight\" Patient states it was formed.         "

## 2025-06-04 NOTE — PROGRESS NOTES
[facility]  Hospitalist Progress Note   06/04/2025          Assessment and Plan:       Mckayla Grady is a 69 year old female with history of ampullary carcinoma status post Whipple's procedure in 2019, GERD, chronic low back pain, insomnia admitted on 6/4/2025 with abdominal pain, nausea and vomiting.      Small bowel obstruction   History of recurrent small bowel obstructions.  Hx ampullary carcinoma s/p Whipple 2019  GERD  *Has hx SBOs. 10/2024 at [facility] had stricture and underwent laparotomy, EKATERINA and small bowel resection. Last SBO in 11/2024.   --Presented with lower abdominal pain from afternoon of admission.  Associated nausea, vomiting x 2.  Afebrile.    In ED bradycardic,other VSS.   Sodium, potassium, creatinine within normal limits.    Liver enzymes within normal limits.  Lactic acid 0.6, glucose 113.  *CT with mid small bowel obstruction, trace ascites.   --Continue n.p.o. status.  IV fluids ordered.  ICU UVU as needed antiemetics.  IV as needed pain meds.  IV pantoprazole ordered.  NG tube for decompression.  Serial abdominal exam.  General Surgery following, appreciate comanagement.  Encourage ambulation, minimize narcotic use.  Follow-up with [place] [facility] for surveillance after discharge per schedule    Bradycardia  HR 45-50s in ED. Previous appeared normal. BPs are elevated  - telemetry overight, follow TSH     Chronic low back pain  Continue PTA gabapentin.  On as needed pain meds     Insomnia  Depression, anxiety.  Continue PTA trazodone.  As needed Ativan as needed.       Orders Placed This Encounter      NPO for Medical/Clinical Reasons Except for: Meds, Ice Chips      DVT Prophylaxis: SCDs, ambulate.  Code Status: Full Code  Disposition: Expected discharge in 2 days pending clinical improvement    Medically Ready for Discharge: Anticipated in 2-4 Days       Discussed with patient, bedside RN  Total time greater than 25 minutes.  More than 70% of time spent in direct patient  care, care coordination, patient counseling, and formalizing plan of care.        Marilyn Hanna MD        Interval History:      Patient lying in bed.  Denies any chest pain or palpitations.  Denies any headache or dizziness.  Complaining of abdominal pain, diffuse all through the abdomen, reports little relief of pain since.  No vomiting since hospitalization.   denies any nausea.  This morning and NG tube kinked, patient concerned regarding replacement of NG tube.  Afebrile.  Hemodynamically stable       Physical Exam:        Physical Exam   Temp:  [97.3  F (36.3  C)-98.4  F (36.9  C)] 97.6  F (36.4  C)  Pulse:  [41-50] 41  Resp:  [17-18] 17  BP: (115-165)/(56-75) 125/56  SpO2:  [85 %-99 %] 98 %    Intake/Output Summary (Last 24 hours) at 6/4/2025 1506  Last data filed at 6/4/2025 1247  Gross per 24 hour   Intake --   Output 375 ml   Net -375 ml       Admission Weight: 58 kg (127 lb 13.9 oz)  Current Weight: 58 kg (127 lb 13.9 oz)    PHYSICAL EXAM  GENERAL: Patient is in no distress. Alert and oriented.  HEART: Regular rate and rhythm. S1S2.   LUNGS: Clear to auscultation bilaterally. No expiratory wheeze.  Respirations unlabored  ABDOMEN: No distention, tenderness present.  No guarding or rigidity, sluggish bowel sounds.  NEURO: moving all extremities  EXTREMITIES: No pedal edema.   SKIN: Warm, dry.  PSYCHIATRY Cooperative       Medications:        Current Facility-Administered Medications   Medication Dose Route Frequency Provider Last Rate Last Admin    gabapentin (NEURONTIN) capsule 600 mg  600 mg Oral BID Pito Mack MD        pantoprazole (PROTONIX) IV push injection 40 mg  40 mg Intravenous QAM AC Pito Mack MD   40 mg at 06/04/25 0638    sodium chloride (PF) 0.9% PF flush 3 mL  3 mL Intracatheter Q8H [facility] Pito Mack MD        traZODone (DESYREL) tablet 150 mg  150 mg Oral At Bedtime Pito Mack MD         Current Facility-Administered Medications    Medication Dose Route Frequency Provider Last Rate Last Admin    calcium carbonate (TUMS) chewable tablet 1,000 mg  1,000 mg Oral 4x Daily PRN Pito Mack MD        carboxymethylcellulose PF (REFRESH PLUS) 0.5 % ophthalmic solution 1 drop  1 drop Both Eyes Q1H PRN Marilyn Hanna MD        HYDROmorphone (DILAUDID) injection 0.2 mg  0.2 mg Intravenous Q3H PRN Marilyn Hanna MD        HYDROmorphone (PF) (DILAUDID) injection 0.3 mg  0.3 mg Intravenous Q3H PRN Marilyn Hanna MD        lidocaine (LMX4) cream   Topical Q1H PRN Pito Mack MD        lidocaine 1 % 0.1-1 mL  0.1-1 mL Other Q1H PRN Pito Mack MD        naloxone (NARCAN) injection 0.2 mg  0.2 mg Intravenous Q2 Min PRN Pito Mack MD        Or    naloxone (NARCAN) injection 0.4 mg  0.4 mg Intravenous Q2 Min PRN Pito Mack MD        Or    naloxone (NARCAN) injection 0.2 mg  0.2 mg Intramuscular Q2 Min PRN Pito Mack MD        Or    naloxone (NARCAN) injection 0.4 mg  0.4 mg Intramuscular Q2 Min PRN Pito Mack MD        ondansetron (ZOFRAN ODT) ODT tab 4 mg  4 mg Oral Q6H PRN Pito Mack MD        Or    ondansetron (ZOFRAN) injection 4 mg  4 mg Intravenous Q6H PRN Pito Mack MD        senna-docusate (SENOKOT-S/PERICOLACE) 8.6-50 MG per tablet 1 tablet  1 tablet Oral BID PRN Pito Mack MD        Or    senna-docusate (SENOKOT-S/PERICOLACE) 8.6-50 MG per tablet 2 tablet  2 tablet Oral BID PRN Pito Mack MD        sodium chloride (PF) 0.9% PF flush 3 mL  3 mL Intracatheter q1 min prPito Nielsen MD                Data:      All new lab and imaging data was reviewed.

## 2025-06-04 NOTE — ED PROVIDER NOTES
"  Emergency Department Note      History of Present Illness     Chief Complaint   Abdominal Pain      HPI   Mckayla Grady is a 69 year old female with a history of a small bowel obstruction who presents to the ED with her  for evaluation of abdominal pain. Patient reports that she felt some \"twinges\" this afternoon in her abdomen, after eating dinner the lower abdominal pain became much worse, and a short while later she vomited twice. The abdominal pain radiates into her back some. She says this feels similar to her last bowel obstruction. She denies fever, myalgias, dysuria, hematuria, the use of anticoagulants, chest pain, cough, or shortness of breath.    Independent Historian   None    Review of External Notes   None    Past Medical History     Medical History and Problem List   Diverticulosis  Gastroesophageal reflux disease   Ampullary carcinoma  Small bowel obstruction  Degenerative disc disease     Medications   The patient is not currently taking any regular medications.     Surgical History    section x 3  Whipple resection  Appendectomy  Cholecystectomy  Colonoscopy x 2  Dissectomy lumbar  Diskectomy lumbar x 3  Endoscopic retrograde  Cholangiopancreatogram  Hysterectomy  Lumbar spine fusion  GT toe joint fusion    Physical Exam     Patient Vitals for the past 24 hrs:   BP Temp Pulse Resp SpO2   25 0030 (!) 165/72 -- 50 -- 98 %   25 0008 (!) 164/75 98.4  F (36.9  C) (!) 49 18 99 %   25 0007 (!) 164/75 -- (!) 47 -- 98 %     Physical Exam  Constitutional: Mildly uncomfortable but nontoxic appearing.  HEENT: Atraumatic. Moist mucous membranes.  Neck: Soft.  Supple.  No JVD.  Cardiac: Regular rate and rhythm.  No murmur or rub.  Respiratory: Clear to auscultation bilaterally.  No respiratory distress.    Abdomen: Soft with minimal tenderness in the left lower quadrant.  No guarding.  Nondistended.  Musculoskeletal: No edema.  Normal range of motion.  Neurologic: Alert and " oriented.  Normal tone and bulk.  Skin: No rashes.  No edema.  Psych: Normal affect.  Normal behavior.            Diagnostics     Lab Results   Labs Ordered and Resulted from Time of ED Arrival to Time of ED Departure   COMPREHENSIVE METABOLIC PANEL - Abnormal       Result Value    Sodium 142      Potassium 3.9      Carbon Dioxide (CO2) 24      Anion Gap 13      Urea Nitrogen 14.9      Creatinine 0.62      GFR Estimate >90      Calcium 9.2      Chloride 105      Glucose 113 (*)     Alkaline Phosphatase 93      AST 30      ALT 28      Protein Total 6.9      Albumin 4.2      Bilirubin Total 0.4     CBC WITH PLATELETS AND DIFFERENTIAL - Abnormal    WBC Count 10.8      RBC Count 3.97      Hemoglobin 13.2      Hematocrit 38.1      MCV 96      MCH 33.2 (*)     MCHC 34.6      RDW 11.4      Platelet Count 219      % Neutrophils 75      % Lymphocytes 15      % Monocytes 8      % Eosinophils 1      % Basophils 0      % Immature Granulocytes 0      NRBCs per 100 WBC 0      Absolute Neutrophils 8.2      Absolute Lymphocytes 1.7      Absolute Monocytes 0.9      Absolute Eosinophils 0.1      Absolute Basophils 0.0      Absolute Immature Granulocytes 0.0      Absolute NRBCs 0.0     LACTIC ACID WHOLE BLOOD WITH 1X REPEAT IN 2 HR WHEN >2 - Abnormal    Lactic Acid, Initial 0.6 (*)    LIPASE - Normal    Lipase 20     ROUTINE UA WITH MICROSCOPIC REFLEX TO CULTURE       Imaging   CT Abdomen Pelvis w Contrast   Final Result   IMPRESSION:    1.  Mid small bowel obstruction.   2.  Small amount of ascites.          Independent Interpretation   None    ED Course      Medications Administered   Medications   sodium chloride 0.9 % infusion ( Intravenous $New Bag 6/4/25 0017)   HYDROmorphone (DILAUDID) injection 1 mg (1 mg Intravenous $Given 6/4/25 0025)   ondansetron (ZOFRAN) injection 4 mg (4 mg Intravenous $Given 6/4/25 0023)   iopamidol (ISOVUE-370) solution 66 mL (66 mLs Intravenous $Given 6/4/25 0056)   sodium chloride 0.9 % bag for CT  scan flush (60 mLs Intravenous $Given 6/4/25 0056)       Procedures   Procedures     Discussion of Management   Admitting Hospitalist, Dr. Mack    ED Course   ED Course as of 06/04/25 0216   Wed Jun 04, 2025   0024 I obtained the history and performed the examination as described.    0127 I rechecked and updated the patient.     0137 Paged hospitalist.   0205 I spoke with Dr. Mack from the hospitalist service regarding the patient's presentation, findings here in the ED, and plan of care.        Additional Documentation  None    Medical Decision Making / Diagnosis     CMS Diagnoses: None    MIPS   None    MDM   Mckayla Grady is a 69 year old female who is afebrile and hemodynamically stable.  Her abdomen is soft and benign without acute peritoneal signs.  Reviewed her history and CT scan of the abdomen pelvis was undertaken which showed a small bowel obstruction.  Lab workup as noted as above.  Her pain is improved with the above intervention.  Does not have any vomiting and would like to forego NG tube which I think is reasonable as she did not require NG tube last hospital stay and her bowel obstruction improved with conservative management.  We discussed plan for admission for care of her small bowel obstruction and she is in agreement. Discussed with hospital service who accepts for admission and she is in stable condition at time of admission.    Disposition   The patient was admitted to the hospital.     Diagnosis     ICD-10-CM    1. Small bowel obstruction (H)  K56.609            Discharge Medications   New Prescriptions    No medications on file         Scribe Disclosure:  AGUILAR, Jere Barriga, am serving as a scribe at 12:32 AM on 6/4/2025 to document services personally performed by Oliver Clemente MD based on my observations and the provider's statements to me.        Oliver Clemente MD  06/04/25 0477

## 2025-06-04 NOTE — ED NOTES
"[facility]  ED Nurse Handoff Report    ED Chief complaint: Abdominal Pain      ED Diagnosis:   Final diagnoses:   None       Code Status: to be addressed    Allergies:   Allergies   Allergen Reactions    Ampicillin GI Disturbance       Patient Story:Mckayla Grady is a 69 year old female with a history of a small bowel obstruction who presents to the ED with her  for evaluation of abdominal pain. Patient reports that she felt some \"twinges\" this afternoon in her abdomen, after eating dinner the lower abdominal pain became much worse, and a short while later she vomited twice. The abdominal pain radiates into her back some. She says this feels similar to her last bowel obstruction. She denies fever, myalgias, dysuria, hematuria, the use of anticoagulants, chest pain, cough, or shortness of breath.  Focused Assessment:  abdominal pain    Treatments and/or interventions provided: labs,Ct and medications  Patient's response to treatments and/or interventions: ongoing    To be done/followed up on inpatient unit:  .    Does this patient have any cognitive concerns?: none    Activity level - Baseline/Home:  Independent  Activity Level - Current:   Independent    Patient's Preferred language: English   Needed?: No    Isolation: None  Infection: Not Applicable  Sepsis treatment initiated: .  Patient tested for COVID 19 prior to admission: NO  Bariatric?: No    Vital Signs:   Vitals:    06/04/25 0007 06/04/25 0008 06/04/25 0030   BP: (!) 164/75 (!) 164/75 (!) 165/72   Pulse: (!) 47 (!) 49 50   Resp:  18    Temp:  98.4  F (36.9  C)    SpO2: 98% 99% 98%       Cardiac Rhythm:     Was the PSS-3 completed:   Yes  What interventions are required if any?               Family Comments:   here  OBS brochure/video discussed/provided to patient/family: No              Name of person given brochure if not patient: .              Relationship to patient: .    For the majority of the shift this " patient's behavior was Green.   Behavioral interventions performed were no.    ED NURSE PHONE NUMBER: [phone]

## 2025-06-04 NOTE — PLAN OF CARE
Goal Outcome Evaluation:    Pt arrived on floor around 0410. A&Ox4. Denies chest pain and SOB. NS @ 100 mL/hr. NPO per order. SBA. All pt needs met at this time.

## 2025-06-04 NOTE — PHARMACY-ADMISSION MEDICATION HISTORY
Pharmacist Admission Medication History    Admission medication history is complete. The information provided in this note is only as accurate as the sources available at the time of the update.    Information Source(s): Patient and CareEverywhere/SureScripts via in-person    Pertinent Information: Patient is on several supplements, she reports that she has not been able to refill them for a the past few weeks.     Changes made to PTA medication list:  Added: None  Deleted: Albuterol, ativan, zofran, senna-doc  Changed: Gabapentin to 600 mg BID, trazodone to 150 mg at bedtime     Allergies reviewed with patient and updates made in EHR: no    Medication History Completed By: SANDRA MAYBERRY RPH 6/4/2025 9:27 AM    PTA Med List   Medication Sig Last Dose/Taking    clobetasol (TEMOVATE) 0.05 % external ointment Apply topically 2 times daily as needed. More than a month    ESTRADIOL 0.1MG/GM CREAM Place vaginally twice a week. Insert 1 gram vaginally 2-3 times per week More than a month    Ferrous Sulfate 27 MG TABS Take 1 tablet by mouth daily. Past Month    gabapentin (NEURONTIN) 300 MG capsule Take 600 mg by mouth 2 times daily. 6/3/2025 Morning    omeprazole (PRILOSEC) 20 MG DR capsule Take 20 mg by mouth daily as needed. 6/3/2025 Evening    polyethylene glycol (MIRALAX) 17 g packet Take 1 packet by mouth daily as needed for constipation. Past Month    traZODone (DESYREL) 100 MG tablet Take 150 mg by mouth at bedtime. Past Week Bedtime    vitamin D3 (CHOLECALCIFEROL) 125 MCG (5000 UT) tablet Take 5,000 Units by mouth daily. Past Month    multivitamin w/minerals (THERA-VIT-M) tablet Take 1 tablet by mouth daily Past Month    omega 3 1000 MG CAPS Take 1 capsule by mouth daily. Past Month

## 2025-06-04 NOTE — H&P
[facility]    History and Physical - Hospitalist Service       Date of Admission:  6/4/2025    Assessment & Plan      Mckayla Grady is a 69 year old female admitted on 6/4/2025. She presents with abdominal pain    Small bowel obstruction   Hx ampullary carcinoma s/p Whipple 2019  GERD  *Has hx SBOs. 10/2024 at [facility] [place] had stricture and underwent laparotomy, EKATERINA and small bowel resection. Last SBO in 11/2024.   *lower abdominal pain started in afternoon, much worse after dinner. Vomited x 2. Feels similar to previous SBO. In ED bradycardic,other VSS. WBC and lactic normal. LFTs normal.   *CT w/ mid small bowel obstruction, trace ascites.   - NPO, IV fluids  - prn antiemetics, analgesics  - pantoprazole 40 mg daily   - general surgery consult  - hold on NG tube for now, if ongoing n/v will place    Bradycardia  HR 45-50s in ED. Previous appeared normal. BPs are elevated  - telemetry    Chronic low back pain  - resume gabapentin with rec    Insomnia  - resume trazodone with rec          Diet:  NPO, IV fluids  DVT Prophylaxis: Pneumatic Compression Devices  Negro Catheter: Not present  Lines: None     Cardiac Monitoring: None  Code Status:  Full    Clinically Significant Risk Factors Present on Admission                                        Disposition Plan     Medically Ready for Discharge: Anticipated in 2-4 Days           Pito Mack MD  Hospitalist Service  [facility]  Securely message with [employer] (more info)  Text page via [employer] Paging/Directory     ______________________________________________________________________    Chief Complaint   Abdominal pain    History is obtained from the patient, electronic health record, and emergency department physician    History of Present Illness   Mckayla Grady is a 69 year old female who presents with abdominal pain.  She has a history of ampullary carcinoma and underwent a Whipple procedure in 2019.  In October 2024  she had her first bowel obstruction and was found to have stricture for which she underwent laparotomy with lysis of adhesions and small bowel resection.  She had another small bowel obstruction in November 2024 that resolved conservatively.  Since then she has been doing well.  The last week or so she states she has been trying to advance her diet a little bit to see how she would tolerate certain foods.  Today she was feeling twinges of pain in her belly but she feels these occasionally.  She had dinner this evening and then developed fairly severe abdominal pain.  She also had nausea and vomiting.  She had a bowel movement just prior to coming to the emergency department but has not Passed gas.  In the emergency department currently her pain is under control after Dilaudid.  She has no current nausea or vomiting.  She is not passing gas.  She describes her belly pain is diffuse.  She has no chest pain or shortness of breath.      Past Medical History    Past Medical History:   Diagnosis Date    Esophageal reflux     Hallux rigidus     bilatateral    Insomnia, unspecified     Numbness and tingling     localized to LLE; > TOP OF FOOT & CALF AREAS    PONV (postoperative nausea and vomiting)     Primary adenocarcinoma of ampulla of Vater (H)     Seasonal allergies        Past Surgical History   Past Surgical History:   Procedure Laterality Date    ABDOMEN SURGERY      csection X3    ABDOMEN SURGERY  08/2019    whipple resection    APPENDECTOMY  1963    CHOLECYSTECTOMY  08/2019    included in whipple surgery    COLONOSCOPY  4/2009    COLONOSCOPY  4/2012    Repeat in 5 years    DISCECTOMY LUMBAR POSTERIOR MICROSCOPIC ONE LEVEL Right 1/8/2015    Procedure: DISCECTOMY LUMBAR POSTERIOR MICROSCOPIC ONE LEVEL;  Surgeon: Román Smith MD;  Location: [facility] OR    DISKECTOMY, LUMBAR, SINGLE SP  2001    X 2    DISKECTOMY, LUMBAR, SINGLE SP  2006     ENDOSCOPIC RETROGRADE CHOLANGIOPANCREATOGRAM N/A 7/20/2019     Procedure: ENDOSCOPIC RETROGRADE CHOLANGIOPANCREATPGRAPHY, SPINCTEROTOMY, BALLOON SWEEP, AMPILLARY BIOPSY, STENT PLACEMENT, SPHINCTEROTOMY;  Surgeon: Hansel Tatum MD;  Location:  OR    ENDOSCOPIC RETROGRADE CHOLANGIOPANCREATOGRAM N/A 7/24/2019    Procedure: ENDOSCOPIC RETROGRADE CHOLANGIOPANCREATOGRAPHY, COMMON BILE DUCT BRUSHINGS AND 10f/7CM STENT PLACEMENT X 2 STENTS, BIOPSIES OF AMPULA  MASS AND;  Surgeon: Michelle Leahy MD;  Location:  OR    ENDOSCOPIC ULTRASOUND UPPER GASTROINTESTINAL TRACT (GI) N/A 7/24/2019    Procedure: ENDOSCOPIC ULTRASOUND, ESOPHAGOSCOPY / UPPER GASTROINTESTINAL TRACT, STENT REMOVAL, AND FNA / BIOPIES OF AMPULA  MASS;  Surgeon: Michelle Leahy MD;  Location:  OR     REVISE MEDIAN N/CARPAL TUNNEL SURG  2002    HYSTERECTOMY, PAP NO LONGER INDICATED      INSERT PORT VASCULAR ACCESS N/A 10/14/2019    Procedure: PORT PLACEMENT, Left Subclavian;  Surgeon: Papa May MD;  Location:  OR    OPTICAL TRACKING SYSTEM FUSION SPINE POSTERIOR LUMBAR TWO LEVELS N/A 8/4/2016    Procedure: OPTICAL TRACKING SYSTEM FUSION SPINE POSTERIOR LUMBAR TWO LEVELS;  Surgeon: Román Smith MD;  Location:  OR    ORTHOPEDIC SURGERY  9/2015    GT TOE JOINT FUSION - BILATERAL    REMOVE PORT VASCULAR ACCESS N/A 8/28/2020    Procedure: PORT REMOVAL;  Surgeon: Papa May MD;  Location:  OR    SURGICAL HISTORY OF -   1976    laparoscopy benign enlarged lymph node    [facility] EXPLORE/TREAT ANKLE JOINT  1989    [facility] TOTAL ABDOM HYSTERECTOMY  2002    with bilat ovary removal       Prior to Admission Medications   Prior to Admission Medications   Prescriptions Last Dose Informant Patient Reported? Taking?   Ferrous Sulfate 27 MG TABS   Yes No   Sig: Take 1 tablet by mouth daily.   LORazepam (ATIVAN) 1 MG tablet   Yes No   Sig: Take 1 mg by mouth daily as needed.   albuterol (PROAIR HFA/PROVENTIL HFA/VENTOLIN HFA) 108 (90 Base) MCG/ACT inhaler  Self No No    Sig: Inhale 2 puffs into the lungs every 4 hours as needed for shortness of breath / dyspnea or wheezing   clobetasol (TEMOVATE) 0.05 % external ointment   Yes No   Sig: Apply topically 2 times daily as needed.   estradiol (ESTRACE) 0.1 MG/GM vaginal cream   Yes No   Sig: Place vaginally twice a week. On Mondays and Fridays   gabapentin (NEURONTIN) 300 MG capsule   Yes No   Sig: Take 300 mg by mouth 2 times daily. Morning and midday   gabapentin (NEURONTIN) 300 MG capsule   Yes No   Sig: Take 600 mg by mouth at bedtime.   multivitamin w/minerals (THERA-VIT-M) tablet  Self Yes No   Sig: Take 1 tablet by mouth daily   omega 3 1000 MG CAPS   Yes No   Sig: Take 1 capsule by mouth daily.   omeprazole (PRILOSEC) 20 MG DR capsule   Yes No   Sig: Take 20 mg by mouth daily as needed.   ondansetron (ZOFRAN ODT) 4 MG ODT tab   No No   Sig: Take 1 tablet (4 mg) by mouth every 6 hours as needed for nausea or vomiting.   polyethylene glycol (MIRALAX) 17 g packet   Yes No   Sig: Take 1 packet by mouth daily as needed for constipation.   senna-docusate (SENOKOT-S/PERICOLACE) 8.6-50 MG tablet   No No   Sig: Take 1 tablet by mouth at bedtime.   traZODone (DESYREL) 50 MG tablet   Yes No   Sig: Take 2 tablets (100 mg) by mouth at bedtime.   vitamin D3 (CHOLECALCIFEROL) 125 MCG (5000 UT) tablet   Yes No   Sig: Take 5,000 Units by mouth daily.      Facility-Administered Medications: None        Review of Systems    The 10 point Review of Systems is negative other than noted in the HPI or here.     Social History   I have reviewed this patient's social history and updated it with pertinent information if needed.  Social History     Tobacco Use    Smoking status: Never    Smokeless tobacco: Never   Substance Use Topics    Alcohol use: Yes     Alcohol/week: 0.0 standard drinks of alcohol     Comment: 2 drinks/week    Drug use: No        Physical Exam   Vital Signs: Temp: 98.4  F (36.9  C)   BP: (!) 165/72 Pulse: (!) 45   Resp: 18 SpO2: 98  % O2 Device: Nasal cannula Oxygen Delivery: 2 LPM  Weight: 0 lbs 0 oz    General Appearance: Alert, very pleasant, no distress  Respiratory: CTA B  Cardiovascular: RRR, YOVANY noted. No edema  GI: soft, tender diffusely to light touch. No  g/r  Skin: no rashes or lesions grossly   Other: CN grossly intact, GODFREY      Medical Decision Making       55 MINUTES SPENT BY ME on the date of service doing chart review, history, exam, documentation & further activities per the note.      Data   ------------------------- PAST 24 HR DATA REVIEWED -----------------------------------------------    I have personally reviewed the following data over the past 24 hrs:    10.8  \   13.2   / 219     142 105 14.9 /  113 (H)   3.9 24 0.62 \     ALT: 28 AST: 30 AP: 93 TBILI: 0.4   ALB: 4.2 TOT PROTEIN: 6.9 LIPASE: 20     Procal: N/A CRP: N/A Lactic Acid: 0.6 (L)         Imaging results reviewed over the past 24 hrs:   Recent Results (from the past 24 hours)   CT Abdomen Pelvis w Contrast    Narrative    EXAM: CT ABDOMEN PELVIS W CONTRAST  LOCATION: [facility]  DATE: 6/4/2025    INDICATION: lower abdominal pain, vomiting, history of SBO, feels similar  COMPARISON: 11/14/2024.  TECHNIQUE: CT scan of the abdomen and pelvis was performed following injection of IV contrast. Multiplanar reformats were obtained. Dose reduction techniques were used.  CONTRAST: 66mL Isovue 370    FINDINGS:   LOWER CHEST: Normal.    HEPATOBILIARY: The gallbladder is absent. No significant change in probable scarring in the inferior right lobe.    PANCREAS: Whipple procedure.    SPLEEN: Normal.    ADRENAL GLANDS: Normal.    KIDNEYS/BLADDER: Normal.    BOWEL: There are several dilated small bowel loops in the lower abdomen and pelvis. Transition point is in the anterior pelvis in the region of the surgical anastomosis. Distal small bowel and colon are decompressed. There are colonic diverticula without   acute diverticulitis. No free  intraperitoneal gas. Small amount of ascites.    LYMPH NODES: Normal.    VASCULATURE: Normal.    PELVIC ORGANS: The uterus is absent. There is no adnexal mass.    MUSCULOSKELETAL: Lumbosacral fusion.      Impression    IMPRESSION:   1.  Mid small bowel obstruction.  2.  Small amount of ascites.

## 2025-06-04 NOTE — PROGRESS NOTES
RECEIVING UNIT ED HANDOFF REVIEW    ED Nurse Handoff Report was reviewed by: Mercedes Mora RN on June 4, 2025 at 3:44 AM

## 2025-06-04 NOTE — CONSULTS
General Surgery Consultation  We are asked by Dr. Pito Mack to see Mckayla Grady in consultation regarding small bowel obstruction.    Mckayla Grady  YOB: 1955    Age: 69 year old  MRN#: [id number]    Date of Admission: 6/4/2025  Surgeon:   Adriano Pisano MD                  Chief Complaint:   Abdominal pain         History of Present Illness:   This patient is a 69 year old female known to our service from prior hospitalization who presented to the [facility] ED overnight for evaluation of abdominal pain and vomiting. Mckayla reports that she was doing fairly well yesterday, was quite physically active, then started feeling some intermittent abdominal pain. This eventually progressed to significant pain, and she vomited twice which prompted her to seek care. In the ED she was found to have CT evidence of small bowel obstruction with transition point near the surgical anastomosis.     Her history is significant for a Whipple in 2019 for ampullary cancer and recurrent SBOs. She underwent laparotomy with lysis of adhesions and small bowel resection at [facility] on 10/27/24. After discharge she had recurrence of likely partial SBO and was admitted to our hospital 11/14/24 - 11/19/24. Her obstruction was managed nonoperatively during that admission, and Mckayla reports that she has been doing well since then. She has still followed a fairly low fiber diet, though she has tolerated small amounts of broccoli and other fibrous foods at times. Recently has tried very small amounts of new foods (watermelon, potato salad, baked beans).     Since being in the hospital Mckayla is feeling a little better. Currently no significant pain or nausea.         Past Medical History:    has a past medical history of Esophageal reflux, Hallux rigidus, Insomnia, unspecified, Numbness and tingling, PONV (postoperative nausea and vomiting), Primary adenocarcinoma of ampulla of Vater (H), and Seasonal  allergies.          Past Surgical History:     Past Surgical History:   Procedure Laterality Date    ABDOMEN SURGERY      csection X3    ABDOMEN SURGERY  08/2019    whipple resection    APPENDECTOMY  1963    CHOLECYSTECTOMY  08/2019    included in whipple surgery    COLONOSCOPY  4/2009    COLONOSCOPY  4/2012    Repeat in 5 years    DISCECTOMY LUMBAR POSTERIOR MICROSCOPIC ONE LEVEL Right 1/8/2015    Procedure: DISCECTOMY LUMBAR POSTERIOR MICROSCOPIC ONE LEVEL;  Surgeon: Román Smith MD;  Location:  OR    DISKECTOMY, LUMBAR, SINGLE SP  2001    X 2    DISKECTOMY, LUMBAR, SINGLE SP  2006     ENDOSCOPIC RETROGRADE CHOLANGIOPANCREATOGRAM N/A 7/20/2019    Procedure: ENDOSCOPIC RETROGRADE CHOLANGIOPANCREATPGRAPHY, SPINCTEROTOMY, BALLOON SWEEP, AMPILLARY BIOPSY, STENT PLACEMENT, SPHINCTEROTOMY;  Surgeon: Hansel Tatum MD;  Location:  OR    ENDOSCOPIC RETROGRADE CHOLANGIOPANCREATOGRAM N/A 7/24/2019    Procedure: ENDOSCOPIC RETROGRADE CHOLANGIOPANCREATOGRAPHY, COMMON BILE DUCT BRUSHINGS AND 10f/7CM STENT PLACEMENT X 2 STENTS, BIOPSIES OF AMPULA  MASS AND;  Surgeon: Michelle Leahy MD;  Location:  OR    ENDOSCOPIC ULTRASOUND UPPER GASTROINTESTINAL TRACT (GI) N/A 7/24/2019    Procedure: ENDOSCOPIC ULTRASOUND, ESOPHAGOSCOPY / UPPER GASTROINTESTINAL TRACT, STENT REMOVAL, AND FNA / BIOPIES OF AMPULA  MASS;  Surgeon: Michelle Leahy MD;  Location:  OR     REVISE MEDIAN N/CARPAL TUNNEL SURG  2002    HYSTERECTOMY, PAP NO LONGER INDICATED      INSERT PORT VASCULAR ACCESS N/A 10/14/2019    Procedure: PORT PLACEMENT, Left Subclavian;  Surgeon: Papa May MD;  Location:  OR    OPTICAL TRACKING SYSTEM FUSION SPINE POSTERIOR LUMBAR TWO LEVELS N/A 8/4/2016    Procedure: OPTICAL TRACKING SYSTEM FUSION SPINE POSTERIOR LUMBAR TWO LEVELS;  Surgeon: Román Smith MD;  Location:  OR    ORTHOPEDIC SURGERY  9/2015    GT TOE JOINT FUSION - BILATERAL    REMOVE PORT  VASCULAR ACCESS N/A 8/28/2020    Procedure: PORT REMOVAL;  Surgeon: Papa May MD;  Location:  OR    SURGICAL HISTORY OF -   1976    laparoscopy benign enlarged lymph node    [facility] EXPLORE/TREAT ANKLE JOINT  1989    [facility] TOTAL ABDOM HYSTERECTOMY  2002    with bilat ovary removal            Medications:     Prior to Admission medications    Medication Sig Start Date End Date Taking? Authorizing Provider   clobetasol (TEMOVATE) 0.05 % external ointment Apply topically 2 times daily as needed.   Yes Unknown, Entered By History   ESTRADIOL 0.1MG/GM CREAM Place vaginally twice a week. Insert 1 gram vaginally 2-3 times per week   Yes Unknown, Entered By History   Ferrous Sulfate 27 MG TABS Take 1 tablet by mouth daily.   Yes Unknown, Entered By History   gabapentin (NEURONTIN) 300 MG capsule Take 600 mg by mouth 2 times daily.   Yes Unknown, Entered By History   multivitamin w/minerals (THERA-VIT-M) tablet Take 1 tablet by mouth daily   Yes Unknown, Entered By History   omega 3 1000 MG CAPS Take 1 capsule by mouth daily.   Yes Unknown, Entered By History   omeprazole (PRILOSEC) 20 MG DR capsule Take 20 mg by mouth daily as needed.   Yes Unknown, Entered By History   polyethylene glycol (MIRALAX) 17 g packet Take 1 packet by mouth daily as needed for constipation.   Yes Unknown, Entered By History   traZODone (DESYREL) 100 MG tablet Take 150 mg by mouth at bedtime. 11/19/24  Yes Leti Gagnon PA-C   vitamin D3 (CHOLECALCIFEROL) 125 MCG (5000 UT) tablet Take 5,000 Units by mouth daily.   Yes Unknown, Entered By History            Allergies:     Allergies   Allergen Reactions    Ampicillin GI Disturbance            Social History:     Social History     Tobacco Use    Smoking status: Never    Smokeless tobacco: Never   Substance Use Topics    Alcohol use: Yes     Alcohol/week: 0.0 standard drinks of alcohol     Comment: 2 drinks/week              Physical Exam:   Blood pressure 125/56, pulse (!) 41,  temperature 97.6  F (36.4  C), temperature source Oral, resp. rate 17, last menstrual period 05/21/2002, SpO2 98%, not currently breastfeeding.  No intake/output data recorded.    General - This is a well developed, well nourished female in no apparent distress. Alert and oriented x 3  Head - normocephalic, atraumatic  Eyes - no scleral icterus, extraocular movements intact  Respiratory - non-labored breathing   Abdomen - Soft, no significant distention. Tenderness along lower abdomen, upper abdomen nontender. No areas of focal or severe tenderness, no rigidity or guarding.   Extremities - Moves all extremities. Warm without edema. No calf tenderness  Neurologic - Nonfocal          Data:   Labs:  Recent Labs   Lab Test 06/04/25  0015 11/19/24  0709 11/18/24  1126   WBC 10.8 8.1 11.8*   HGB 13.2 9.8* 9.1*   HCT 38.1 29.3* 26.3*    377 298     Recent Labs   Lab Test 06/04/25  0015 11/19/24  0709 11/18/24  0658   POTASSIUM 3.9 3.6 3.6   CHLORIDE 105 104 107   CO2 24 26 26   BUN 14.9 6.1* 3.7*   CR 0.62 0.46* 0.43*     Recent Labs   Lab Test 06/04/25  0015 11/14/24  0812 08/26/19  1309 07/24/19  1301 07/21/19  0701   BILITOTAL 0.4 0.4 1.3   < > 6.9*   ALT 28 12 47   < > 45   AST 30 15 43   < > 34   ALKPHOS 93 143 225*   < > 356*   LIPASE 20 41  --   --  193    < > = values in this interval not displayed.     Recent Labs   Lab Test 06/04/25  0015 11/19/24  0709 11/18/24  0658   CARMEN 9.2 8.7* 8.3*     Recent Labs   Lab Test 06/04/25  0854 06/04/25  0015 11/19/24  0709 11/18/24  0658 11/15/24  0714 11/14/24  0812 09/12/19  1040 08/26/19  1309 07/19/19  1649 07/10/19  0831   ANIONGAP  --  13 11 5*   < > 11  --   --    < >  --    PROTEIN Negative  --   --   --   --   --  100*  --   --  30*   ALBUMIN  --  4.2  --   --   --  3.8  --  3.5   < >  --     < > = values in this interval not displayed.       CT scan of the abdomen:   FINDINGS:   LOWER CHEST: Normal.     HEPATOBILIARY: The gallbladder is absent. No significant  change in probable scarring in the inferior right lobe.     PANCREAS: Whipple procedure.     SPLEEN: Normal.     ADRENAL GLANDS: Normal.     KIDNEYS/BLADDER: Normal.     BOWEL: There are several dilated small bowel loops in the lower abdomen and pelvis. Transition point is in the anterior pelvis in the region of the surgical anastomosis. Distal small bowel and colon are decompressed. There are colonic diverticula without   acute diverticulitis. No free intraperitoneal gas. Small amount of ascites.     LYMPH NODES: Normal.     VASCULATURE: Normal.     PELVIC ORGANS: The uterus is absent. There is no adnexal mass.     MUSCULOSKELETAL: Lumbosacral fusion.                                                                      IMPRESSION:   1.  Mid small bowel obstruction.  2.  Small amount of ascites.            Assessment:   Mckayla Grady is a 69 year old female with complex prior abdominal surgical history including Whipple in 2019 and ex lap with small bowel resection in 2024 currently admitted with recurrent small bowel obstruction.         Plan:   - Recommend conservative management with bowel rest. Very hopeful to avoid additional surgery for this patient  - Place NG tube for decompression, stomach is distended on CT. Discussed with patient who agrees to placement  - Keep NPO except for ice chips, NG to LIS  - Encourage frequent out of bed, ambulate to promote bowel function, wear PCDs while resting  - On Protonix IV daily  - Consider contrast challenge in the next 1-2 days pending NG output and clinical picture  - Medical management per hospitalist. General surgery will continue to follow     I have discussed the history, physical, and plan with Dr. Pisano who will independently interview and examine the patient and update the stated plan as indicated.        Gisele Matt PA-C  [facility]  [phone]

## 2025-06-05 ENCOUNTER — APPOINTMENT (OUTPATIENT)
Dept: GENERAL RADIOLOGY | Facility: CLINIC | Age: 70
End: 2025-06-05
Attending: STUDENT IN AN ORGANIZED HEALTH CARE EDUCATION/TRAINING PROGRAM
Payer: COMMERCIAL

## 2025-06-05 VITALS
WEIGHT: 127.87 LBS | OXYGEN SATURATION: 96 % | RESPIRATION RATE: 16 BRPM | TEMPERATURE: 97.5 F | BODY MASS INDEX: 23.39 KG/M2 | SYSTOLIC BLOOD PRESSURE: 100 MMHG | HEART RATE: 53 BPM | DIASTOLIC BLOOD PRESSURE: 63 MMHG

## 2025-06-05 LAB
ALBUMIN SERPL BCG-MCNC: 3.6 G/DL (ref 3.5–5.2)
ALP SERPL-CCNC: 78 U/L (ref 40–150)
ALT SERPL W P-5'-P-CCNC: 19 U/L (ref 0–50)
ANION GAP SERPL CALCULATED.3IONS-SCNC: 11 MMOL/L (ref 7–15)
AST SERPL W P-5'-P-CCNC: 19 U/L (ref 0–45)
BILIRUB SERPL-MCNC: 0.5 MG/DL
BUN SERPL-MCNC: 8.1 MG/DL (ref 8–23)
CALCIUM SERPL-MCNC: 8.3 MG/DL (ref 8.8–10.4)
CHLORIDE SERPL-SCNC: 112 MMOL/L (ref 98–107)
CREAT SERPL-MCNC: 0.54 MG/DL (ref 0.51–0.95)
EGFRCR SERPLBLD CKD-EPI 2021: >90 ML/MIN/1.73M2
ERYTHROCYTE [DISTWIDTH] IN BLOOD BY AUTOMATED COUNT: 11.8 % (ref 10–15)
GLUCOSE SERPL-MCNC: 84 MG/DL (ref 70–99)
HCO3 SERPL-SCNC: 22 MMOL/L (ref 22–29)
HCT VFR BLD AUTO: 32.5 % (ref 35–47)
HGB BLD-MCNC: 11.5 G/DL (ref 11.7–15.7)
MAGNESIUM SERPL-MCNC: 1.8 MG/DL (ref 1.7–2.3)
MCH RBC QN AUTO: 33.2 PG (ref 26.5–33)
MCHC RBC AUTO-ENTMCNC: 35.4 G/DL (ref 31.5–36.5)
MCV RBC AUTO: 94 FL (ref 78–100)
PLATELET # BLD AUTO: 170 10E3/UL (ref 150–450)
POTASSIUM SERPL-SCNC: 3.8 MMOL/L (ref 3.4–5.3)
PROT SERPL-MCNC: 5.8 G/DL (ref 6.4–8.3)
RBC # BLD AUTO: 3.46 10E6/UL (ref 3.8–5.2)
SODIUM SERPL-SCNC: 145 MMOL/L (ref 135–145)
TSH SERPL DL<=0.005 MIU/L-ACNC: 1.55 UIU/ML (ref 0.3–4.2)
WBC # BLD AUTO: 6.2 10E3/UL (ref 4–11)

## 2025-06-05 PROCEDURE — 36415 COLL VENOUS BLD VENIPUNCTURE: CPT | Performed by: STUDENT IN AN ORGANIZED HEALTH CARE EDUCATION/TRAINING PROGRAM

## 2025-06-05 PROCEDURE — 250N000009 HC RX 250: Performed by: STUDENT IN AN ORGANIZED HEALTH CARE EDUCATION/TRAINING PROGRAM

## 2025-06-05 PROCEDURE — 120N000001 HC R&B MED SURG/OB

## 2025-06-05 PROCEDURE — 250N000013 HC RX MED GY IP 250 OP 250 PS 637: Performed by: HOSPITALIST

## 2025-06-05 PROCEDURE — 258N000003 HC RX IP 258 OP 636: Performed by: INTERNAL MEDICINE

## 2025-06-05 PROCEDURE — 255N000002 HC RX 255 OP 636: Performed by: STUDENT IN AN ORGANIZED HEALTH CARE EDUCATION/TRAINING PROGRAM

## 2025-06-05 PROCEDURE — 85027 COMPLETE CBC AUTOMATED: CPT | Performed by: STUDENT IN AN ORGANIZED HEALTH CARE EDUCATION/TRAINING PROGRAM

## 2025-06-05 PROCEDURE — 250N000013 HC RX MED GY IP 250 OP 250 PS 637: Performed by: INTERNAL MEDICINE

## 2025-06-05 PROCEDURE — 74018 RADEX ABDOMEN 1 VIEW: CPT

## 2025-06-05 PROCEDURE — 84443 ASSAY THYROID STIM HORMONE: CPT | Performed by: HOSPITALIST

## 2025-06-05 PROCEDURE — 99232 SBSQ HOSP IP/OBS MODERATE 35: CPT | Performed by: HOSPITALIST

## 2025-06-05 PROCEDURE — 83735 ASSAY OF MAGNESIUM: CPT | Performed by: HOSPITALIST

## 2025-06-05 PROCEDURE — 80053 COMPREHEN METABOLIC PANEL: CPT | Performed by: STUDENT IN AN ORGANIZED HEALTH CARE EDUCATION/TRAINING PROGRAM

## 2025-06-05 PROCEDURE — 99231 SBSQ HOSP IP/OBS SF/LOW 25: CPT | Performed by: STUDENT IN AN ORGANIZED HEALTH CARE EDUCATION/TRAINING PROGRAM

## 2025-06-05 PROCEDURE — 250N000011 HC RX IP 250 OP 636: Performed by: INTERNAL MEDICINE

## 2025-06-05 RX ORDER — HYDRALAZINE HYDROCHLORIDE 20 MG/ML
10 INJECTION INTRAMUSCULAR; INTRAVENOUS EVERY 4 HOURS PRN
Status: ACTIVE | OUTPATIENT
Start: 2025-06-05

## 2025-06-05 RX ORDER — LIDOCAINE 4 G/G
1 PATCH TOPICAL EVERY 24 HOURS
Status: DISPENSED | OUTPATIENT
Start: 2025-06-05

## 2025-06-05 RX ORDER — OXYCODONE HYDROCHLORIDE 5 MG/1
5 TABLET ORAL EVERY 6 HOURS PRN
Refills: 0 | Status: ACTIVE | OUTPATIENT
Start: 2025-06-05

## 2025-06-05 RX ADMIN — CARBOXYMETHYLCELLULOSE SODIUM 1 DROP: 5 SOLUTION/ DROPS OPHTHALMIC at 09:41

## 2025-06-05 RX ADMIN — SODIUM CHLORIDE: 0.9 INJECTION, SOLUTION INTRAVENOUS at 18:55

## 2025-06-05 RX ADMIN — GABAPENTIN 600 MG: 300 CAPSULE ORAL at 08:52

## 2025-06-05 RX ADMIN — METHOCARBAMOL 500 MG: 500 TABLET ORAL at 08:52

## 2025-06-05 RX ADMIN — SODIUM CHLORIDE: 0.9 INJECTION, SOLUTION INTRAVENOUS at 09:01

## 2025-06-05 RX ADMIN — LIDOCAINE 1 PATCH: 4 PATCH TOPICAL at 17:28

## 2025-06-05 RX ADMIN — WATER 150 ML: 100 IRRIGANT IRRIGATION at 12:55

## 2025-06-05 RX ADMIN — PANTOPRAZOLE SODIUM 40 MG: 40 INJECTION, POWDER, FOR SOLUTION INTRAVENOUS at 06:24

## 2025-06-05 ASSESSMENT — ACTIVITIES OF DAILY LIVING (ADL)
ADLS_ACUITY_SCORE: 58
ADLS_ACUITY_SCORE: 59
ADLS_ACUITY_SCORE: 59
ADLS_ACUITY_SCORE: 58
ADLS_ACUITY_SCORE: 59
ADLS_ACUITY_SCORE: 58
ADLS_ACUITY_SCORE: 59
ADLS_ACUITY_SCORE: 58
ADLS_ACUITY_SCORE: 58
ADLS_ACUITY_SCORE: 59
ADLS_ACUITY_SCORE: 58
ADLS_ACUITY_SCORE: 59
ADLS_ACUITY_SCORE: 58
ADLS_ACUITY_SCORE: 59
ADLS_ACUITY_SCORE: 58
ADLS_ACUITY_SCORE: 59
ADLS_ACUITY_SCORE: 59
ADLS_ACUITY_SCORE: 58
ADLS_ACUITY_SCORE: 58

## 2025-06-05 NOTE — PROVIDER NOTIFICATION
"MD Notification    Notified Person: MD    Notified Person Name: Dr Adriano Pisano    Notification Date/Time: 1313    Notification Interaction: [employer] messaging     Purpose of Notification:    Rn- Good afternoon. I wanted to clarify if pt was okay to have meds? Currently NPO with meds/ice chips but your team's note from yesterday said \"NPO except for ice chips\". I also wanted to clarify if it was okay for pt NG tube be clamped for 8-10 hours until Xray post gastrografin challenge. Please advise thank you Dr Pisano.    Orders Received:     MD- If she can tolerate it, clamp her until she feels N/V or abd pain, During her being clamped, no ice chips NOR meds    Comments:   "

## 2025-06-05 NOTE — PLAN OF CARE
Goal Outcome Evaluation:    Vital signs stable on RA. Alert and oriented x 4. No cp,no sob. Bowel sounds hypoactive, flatus +. Pain controlled with one time robaxin. Up with SBA. On NPO. CMS intact ex for baseline BLE numbness and tingling.

## 2025-06-05 NOTE — PLAN OF CARE
Goal Outcome Evaluation:    Denies CP, SOB. All pt needs met at this time. Dr Pisano advised if pt can tolerate to keep pt NG tube clamped after gastrografin until xray. Xray said they would try for 2100 but to have staff call we have not heard before 2200. No meds or ice chips during gastrografin challenge per Dr Pisano, pt aware. Clamped NG 30 min prior to gastrografin challenge and pt still is NG clamped per MD until after challenge. Pt was LIS NG (with clamping for 30 min prior to oral meds and 40 min after) until 1230, then NG clamped. Strict NPO per Dr Pisano.

## 2025-06-05 NOTE — PROGRESS NOTES
[facility]  Hospitalist Progress Note   06/05/2025          Assessment and Plan:       Mckayla Grady is a 69 year old female with history of ampullary carcinoma status post Whipple's procedure in 2019, GERD, chronic low back pain, insomnia admitted on 6/4/2025 with abdominal pain, nausea and vomiting.    Small bowel obstruction   History of recurrent small bowel obstructions.  Hx ampullary carcinoma s/p Whipple 2019  GERD  *Has hx SBOs. 10/2024 at [facility] had stricture and underwent laparotomy, EKATERINA and small bowel resection. Last SBO in 11/2024.   --Presented with lower abdominal pain from afternoon of admission.  Associated nausea, vomiting x 2.  Afebrile.    In ED bradycardic,other VSS.   Sodium, potassium, creatinine within normal limits.    Liver enzymes within normal limits.  Lactic acid 0.6, glucose 113.  *CT with mid small bowel obstruction, trace ascites.   --Continue n.p.o. status.  IV fluids ordered.  As needed antiemetics  IV as needed pain meds.  IV pantoprazole ordered.  NG tube for decompression.  Serial abdominal exam.  General Surgery following, appreciate comanagement.  Encourage ambulation, minimize narcotic use.  Follow-up with [place] [facility] for surveillance after discharge per schedule    Bradycardia  Elevated blood pressures without history of hypertension.  Noted heart rate in 40s to 50s.  Elevated blood pressures, no previous history of hypertension.  Patient reports history of previous bradycardia.  Patient denies any headache, some dizziness with ambulation.  No chest pain or palpitations.  Requested telemetry monitoring.  Follow TSH, mag levels.  As needed IV hydralazine ordered.    Acute anemia likely dilutional.  Presented with hemoglobin of 13.2, now dropped to 11.5.  Receiving IV fluids, no active blood loss.  Of note patient has had anemia previously with hemoglobin in the 9-10 range.  Monitor in AM.    Chronic low back pain  Topical lidocaine patch.  Continue PTA  gabapentin.  On pain meds as above    Insomnia  Depression, anxiety.  Continue PTA trazodone.  As needed Ativan as needed.    Orders Placed This Encounter      NPO for Medical/Clinical Reasons Except for: Meds, Ice Chips      DVT Prophylaxis: SCDs, ambulate.  Code Status: Full Code  Disposition: Expected discharge in 1-2 days pending clinical improvement    Medically Ready for Discharge: Anticipated Tomorrow     Discussed with patient, floor RN  Total time greater than 35 minutes.  More than 70% of time spent in direct patient care, care coordination, patient counseling, and formalizing plan of care.        Marilyn Hanna MD        Interval History:      Patient lying in bed.  Denies any chest pain or palpitations.  Denies any headache , some dizziness with ambulation  Denies any nausea, no vomiting.  Some improvement in abdominal pain.  NG tube in place, output bilious.  Afebrile.  Blood pressure slightly elevated.  Noted heart rate in 40-50s       Physical Exam:        Physical Exam   Temp:  [97.8  F (36.6  C)-98.1  F (36.7  C)] 97.8  F (36.6  C)  Pulse:  [44-48] 44  Resp:  [16-18] 18  BP: (128-154)/(55-66) 128/55  SpO2:  [95 %-96 %] 96 %    Intake/Output Summary (Last 24 hours) at 6/4/2025 1506  Last data filed at 6/4/2025 1247  Gross per 24 hour   Intake --   Output 375 ml   Net -375 ml       Admission Weight: 58 kg (127 lb 13.9 oz)  Current Weight: 58 kg (127 lb 13.9 oz)    PHYSICAL EXAM  GENERAL: Patient is in no distress. Alert and oriented.  HEART: Regular rate and rhythm. S1S2.  Bradycardia  LUNGS: Respirations unlabored  ABDOMEN: No distention, tenderness present.  No guarding or rigidity, sluggish bowel sounds.  NEURO: moving all extremities  EXTREMITIES: No pedal edema.   SKIN: Warm, dry.  PSYCHIATRY Cooperative       Medications:        Current Facility-Administered Medications   Medication Dose Route Frequency Provider Last Rate Last Admin    gabapentin (NEURONTIN) capsule 600 mg  600 mg Oral BID  Pito Mack MD   600 mg at 06/05/25 0852    methocarbamol (ROBAXIN) tablet 500 mg  500 mg Oral 4x Daily Virginia Martinez MD   500 mg at 06/05/25 0852    pantoprazole (PROTONIX) IV push injection 40 mg  40 mg Intravenous QAM AC Pito Mack MD   40 mg at 06/05/25 0624    sodium chloride (PF) 0.9% PF flush 3 mL  3 mL Intracatheter Q8H TEO Pito Mack MD        traZODone (DESYREL) tablet 150 mg  150 mg Oral At Bedtime Piot Mack MD   150 mg at 06/04/25 2308     Current Facility-Administered Medications   Medication Dose Route Frequency Provider Last Rate Last Admin    calcium carbonate (TUMS) chewable tablet 1,000 mg  1,000 mg Oral 4x Daily PRN Pito Mack MD        carboxymethylcellulose PF (REFRESH PLUS) 0.5 % ophthalmic solution 1 drop  1 drop Both Eyes Q1H PRN Marilyn Hanna MD   1 drop at 06/05/25 0941    HYDROmorphone (DILAUDID) injection 0.2 mg  0.2 mg Intravenous Q3H PRN Marilyn Hanna MD        HYDROmorphone (PF) (DILAUDID) injection 0.3 mg  0.3 mg Intravenous Q3H PRN Marilyn Hanna MD        lidocaine (LMX4) cream   Topical Q1H PRN Pito Mack MD        lidocaine 1 % 0.1-1 mL  0.1-1 mL Other Q1H PRN Pito Mack MD        LORazepam (ATIVAN) half-tab 0.25 mg  0.25 mg Oral BID PRN Marilyn Hanna MD        naloxone (NARCAN) injection 0.2 mg  0.2 mg Intravenous Q2 Min PRN Pito Mack MD        Or    naloxone (NARCAN) injection 0.4 mg  0.4 mg Intravenous Q2 Min PRN Pito Mack MD        Or    naloxone (NARCAN) injection 0.2 mg  0.2 mg Intramuscular Q2 Min PRN Pito Mack MD        Or    naloxone (NARCAN) injection 0.4 mg  0.4 mg Intramuscular Q2 Min PRN Pito Mack MD        ondansetron (ZOFRAN ODT) ODT tab 4 mg  4 mg Oral Q6H PRN Pito Mack MD        Or    ondansetron (ZOFRAN) injection 4 mg  4 mg Intravenous Q6H PRN Pito Mack MD         senna-docusate (SENOKOT-S/PERICOLACE) 8.6-50 MG per tablet 1 tablet  1 tablet Oral BID PRN Pito Mack MD        Or    senna-docusate (SENOKOT-S/PERICOLACE) 8.6-50 MG per tablet 2 tablet  2 tablet Oral BID PRN Pito Mack MD        sodium chloride (PF) 0.9% PF flush 3 mL  3 mL Intracatheter q1 min prn Pito Mack MD                Data:      All new lab and imaging data was reviewed.

## 2025-06-05 NOTE — PROGRESS NOTES
[facility]    General Surgery  Daily Progress Note       Assessment and Plan:   Mckayla Grady is a 69 year old female with complex prior abdominal surgical history including Whipple in 2019 and lap EKATERINA with mini ex-lap with small bowel resection in 10/2024 for SBO, currently admitted with recurrent small bowel obstruction (last episode was 11/14/2024).     Patient feeling much better today. Passed some gas. Still some soreness in her LLQ. NG with 400cc out yesterday.    Plan:  -- start gastrograffin study today. AXR in 8 to 10hrs after administration of contrast.  -- Strict NPO when clamped for gastrograffin study. Try to keep clamped for as long as patient tolerates and at least >2hrs  -- continue IVFs  -- will continue to follow along      Adriano Pisano MD         Interval History:   Feeling much better. Pass some gas but small. Still sore in LLQ           Physical Exam:   Temp: 97.8  F (36.6  C) Temp src: Oral BP: 128/55 Pulse: (!) 44   Resp: 18 SpO2: 96 % O2 Device: None (Room air)      I/O last 3 completed shifts:  In: 2315 [P.O.:15; I.V.:2300]  Out: 1075 [Urine:875; Emesis/NG output:200]      Constitutional: healthy, alert, and no distress   Cardiovascular: RRR  Respiratory: RRR  Abdomen: Abdomen soft, non distended, mildly tender in LLQ. No rebound. No peritonitis      Data   Recent Labs   Lab 06/05/25  1131 06/04/25  0015   WBC 6.2 10.8   HGB 11.5* 13.2   MCV 94 96    219    142   POTASSIUM 3.8 3.9   CHLORIDE 112* 105   CO2 22 24   BUN 8.1 14.9   CR 0.54 0.62   ANIONGAP 11 13   CARMEN 8.3* 9.2   GLC 84 113*   ALBUMIN 3.6 4.2   PROTTOTAL 5.8* 6.9   BILITOTAL 0.5 0.4   ALKPHOS 78 93   ALT 19 28   AST 19 30

## 2025-06-06 VITALS
DIASTOLIC BLOOD PRESSURE: 54 MMHG | SYSTOLIC BLOOD PRESSURE: 126 MMHG | RESPIRATION RATE: 17 BRPM | TEMPERATURE: 98.3 F | OXYGEN SATURATION: 96 % | HEART RATE: 58 BPM | BODY MASS INDEX: 23.39 KG/M2 | WEIGHT: 127.87 LBS

## 2025-06-06 LAB
ANION GAP SERPL CALCULATED.3IONS-SCNC: 10 MMOL/L (ref 7–15)
BUN SERPL-MCNC: 13.6 MG/DL (ref 8–23)
CALCIUM SERPL-MCNC: 8.4 MG/DL (ref 8.8–10.4)
CHLORIDE SERPL-SCNC: 111 MMOL/L (ref 98–107)
CREAT SERPL-MCNC: 0.58 MG/DL (ref 0.51–0.95)
EGFRCR SERPLBLD CKD-EPI 2021: >90 ML/MIN/1.73M2
GLUCOSE SERPL-MCNC: 161 MG/DL (ref 70–99)
HCO3 SERPL-SCNC: 21 MMOL/L (ref 22–29)
HGB BLD-MCNC: 12 G/DL (ref 11.7–15.7)
MCV RBC AUTO: 96 FL (ref 78–100)
POTASSIUM SERPL-SCNC: 3.4 MMOL/L (ref 3.4–5.3)
SODIUM SERPL-SCNC: 142 MMOL/L (ref 135–145)

## 2025-06-06 PROCEDURE — 250N000011 HC RX IP 250 OP 636: Performed by: INTERNAL MEDICINE

## 2025-06-06 PROCEDURE — 99239 HOSP IP/OBS DSCHRG MGMT >30: CPT | Performed by: HOSPITALIST

## 2025-06-06 PROCEDURE — 36415 COLL VENOUS BLD VENIPUNCTURE: CPT | Performed by: HOSPITALIST

## 2025-06-06 PROCEDURE — 250N000013 HC RX MED GY IP 250 OP 250 PS 637: Performed by: INTERNAL MEDICINE

## 2025-06-06 PROCEDURE — 258N000003 HC RX IP 258 OP 636: Performed by: INTERNAL MEDICINE

## 2025-06-06 PROCEDURE — 99232 SBSQ HOSP IP/OBS MODERATE 35: CPT | Performed by: PHYSICIAN ASSISTANT

## 2025-06-06 PROCEDURE — 85018 HEMOGLOBIN: CPT | Performed by: HOSPITALIST

## 2025-06-06 PROCEDURE — 80048 BASIC METABOLIC PNL TOTAL CA: CPT | Performed by: HOSPITALIST

## 2025-06-06 PROCEDURE — 250N000013 HC RX MED GY IP 250 OP 250 PS 637: Performed by: HOSPITALIST

## 2025-06-06 RX ORDER — SENNOSIDES 8.6 MG
1 TABLET ORAL 2 TIMES DAILY PRN
Qty: 30 TABLET | Refills: 0 | Status: SHIPPED | OUTPATIENT
Start: 2025-06-06

## 2025-06-06 RX ORDER — PANTOPRAZOLE SODIUM 40 MG/1
40 TABLET, DELAYED RELEASE ORAL DAILY
Qty: 10 TABLET | Refills: 0 | Status: SHIPPED | OUTPATIENT
Start: 2025-06-06

## 2025-06-06 RX ADMIN — METHOCARBAMOL 500 MG: 500 TABLET ORAL at 13:51

## 2025-06-06 RX ADMIN — PANTOPRAZOLE SODIUM 40 MG: 40 INJECTION, POWDER, FOR SOLUTION INTRAVENOUS at 05:11

## 2025-06-06 RX ADMIN — LORAZEPAM 0.25 MG: 0.5 TABLET ORAL at 01:56

## 2025-06-06 RX ADMIN — METHOCARBAMOL 500 MG: 500 TABLET ORAL at 08:10

## 2025-06-06 RX ADMIN — CALCIUM CARBONATE (ANTACID) CHEW TAB 500 MG 1000 MG: 500 CHEW TAB at 05:50

## 2025-06-06 RX ADMIN — OXYCODONE HYDROCHLORIDE 2.5 MG: 5 TABLET ORAL at 01:56

## 2025-06-06 RX ADMIN — GABAPENTIN 600 MG: 300 CAPSULE ORAL at 08:10

## 2025-06-06 RX ADMIN — LIDOCAINE 1 PATCH: 4 PATCH TOPICAL at 13:51

## 2025-06-06 RX ADMIN — SODIUM CHLORIDE: 0.9 INJECTION, SOLUTION INTRAVENOUS at 05:20

## 2025-06-06 ASSESSMENT — ACTIVITIES OF DAILY LIVING (ADL)
ADLS_ACUITY_SCORE: 58

## 2025-06-06 NOTE — PLAN OF CARE
Goal Outcome Evaluation:    Vital signs stable on RA. Alert and oriented x 4. Denies CP,SOB. NG tube in place, flatus +. Up with SBA. On strict NPO  per MD order . Awaiting X-ray result.

## 2025-06-06 NOTE — PROGRESS NOTES
[facility]    General Surgery  Daily Progress Note       Assessment and Plan:   Mckayla Grady is a 69 year old female with complex prior abdominal surgical history including Whipple in 2019 and lap EKATERINA with mini ex-lap with small bowel resection in 10/2024 for SBO, currently admitted with recurrent small bowel obstruction (last episode was 11/14/2024).     Gastrografin passed through colon. Patient feeling better today. Denies abdominal pain, +bm's. NG tube removed.     PLAN:  - Clear liquid diet this morning. Full liquids for lunch.  - Protonix for GI prophylaxis.  - Senokot BID prn.   - Ambulate QID, PCDs for DVT prophylaxis.   - Encourage deep breathe and IS.     DISPOSITION:  - Anticipate discharge this afternoon if she tolerates stepwise advancement of diet.  - Discussed diet and how to manage recurrent symptoms. Patient understands as she has experience.  - Surgery team will sign off.        Interval History:   No complaints  Denies abdominal pain.   Multiple bowel movements since yesterday  Tolerating clear diet  No nausea  Walking   Denies CP, dyspnea         Physical Exam:   Temp: 98.3  F (36.8  C) Temp src: Oral BP: 126/54 Pulse: 58   Resp: 17 SpO2: 96 % O2 Device: None (Room air)      General: VS reviewed, alert, oriented, no acute distress, pleasant and converational  Resp - Non-labored, clear to ascultation.    Cardiac - Regular rate & rhythm without murmur  Abdomen - soft, non tender, non distended. Surgical scars noted.  Extremities: no lower extremity edema or tenderness with palpation  Neurological: Grossly non-focal, mood & affect appropriate      Shun Calle PA-C  Office: [phone]  Pager: [phone]    Data   Recent Labs   Lab 06/06/25  0846 06/05/25  1131 06/04/25  0015   WBC  --  6.2 10.8   HGB 12.0 11.5* 13.2   MCV 96 94 96   PLT  --  170 219    145 142   POTASSIUM 3.4 3.8 3.9   CHLORIDE 111* 112* 105   CO2 21* 22 24   BUN 13.6 8.1 14.9   CR 0.58 0.54 0.62    ANIONGAP 10 11 13   CARMEN 8.4* 8.3* 9.2   * 84 113*   ALBUMIN  --  3.6 4.2   PROTTOTAL  --  5.8* 6.9   BILITOTAL  --  0.5 0.4   ALKPHOS  --  78 93   ALT  --  19 28   AST  --  19 30           40 minutes spent on date of the encounter doing patient visit, chart review, and documentation.

## 2025-06-06 NOTE — DISCHARGE SUMMARY
Discharge Summary  Hospitalist    Date of Admission:  6/4/2025  Date of Discharge:  6/6/2025  Discharging Provider: Marilyn Hanna MD    Primary Care Physician   Karlie Watkins  Primary Care Provider Phone Number: [phone]  Primary Care Provider Fax Number: [phone]    PRINCIPAL DIAGNOSIS  Small bowel obstruction.  Bradycardia.  Elevated blood pressures without history of hypertension - improving   Acute anemia likely dilutional.    Past Medical History:   Diagnosis Date    Esophageal reflux     Hallux rigidus     bilatateral    Insomnia, unspecified     Numbness and tingling     localized to LLE; > TOP OF FOOT & CALF AREAS    PONV (postoperative nausea and vomiting)     Primary adenocarcinoma of ampulla of Vater (H)     Seasonal allergies        History of Present Illness   Mckayla Grady is an 69 year old female who presented with abdominal pain.     Hospital Course   Mckayla Grady is a 69 year old female with history of ampullary carcinoma status post Whipple's procedure in 2019, GERD, chronic low back pain, insomnia admitted on 6/4/2025 with abdominal pain, nausea and vomiting.     Small bowel obstruction   History of recurrent small bowel obstructions.  Hx ampullary carcinoma s/p Whipple 2019  GERD  *Has hx SBOs. 10/2024 at [facility] had stricture and underwent laparotomy, EKATERINA and small bowel resection. Last SBO in 11/2024.   --Presented with lower abdominal pain from afternoon of admission.  Associated nausea, vomiting x 2.  Afebrile.    In ED bradycardic,other VSS.   Sodium, potassium, creatinine within normal limits.    Liver enzymes within normal limits.  Lactic acid 0.6, glucose 113.  *CT with mid small bowel obstruction, trace ascites.   --Patient was managed with NG tube decompression, bowel rest, IV fluids, antiemetics and pain meds.  Gastrografin challenge contrast passed through colon.  Patient having bowel movements, NG tube removed on 6/6 AM.  --Patient started on clear liquid diet and  advance to full liquids for lunch.  Recommended overnight monitoring, Patient reports feels much better, would like to discharge home.    General surgery followed, okay for discharge.  Plan for discharge home with close follow-up.    On full liquid diet, gradually advance to low fiber diet over the next day as tolerated.  Adequate oral hydration.  Encourage ambulation.  Oral pantoprazole for GI prophylaxis.  Senna twice daily as needed.  Follow-up with PCP in 1 week.    Follow-up with [place] [facility] for surveillance after discharge per schedule     Bradycardia  Elevated blood pressures without history of hypertension - improving   Noted heart rate in 40s to 50s.  Elevated blood pressures, no previous history of hypertension.  Patient reports history of previous bradycardia.  Telemetry monitoring sinus bradycardia  Requested telemetry monitoring.  TSH, magnesium within normal limits.  Reports has upcoming echocardiogram.  Consider follow-up with cardiology as outpatient    Acute anemia likely dilutional.  Presented with hemoglobin of 13.2, dropped to 11.5 > 12.0.  Receiving IV fluids, no active blood loss.  Of note patient has had anemia previously with hemoglobin in the 9-10 range.  Monitor in 1 week     Chronic low back pain  Continue PTA gabapentin.     Insomnia  Depression, anxiety.  Continue PTA trazodone.     Marilyn Hanna MD.    Pending Results   Unresulted Labs Ordered in the Past 30 Days of this Admission       No orders found from 5/5/2025 to 6/5/2025.               Physical Exam   Vitals:    06/04/25 1239   Weight: 58 kg (127 lb 13.9 oz)     Vital Signs with Ranges  Temp:  [97.5  F (36.4  C)-98.3  F (36.8  C)] 98.3  F (36.8  C)  Pulse:  [47-58] 58  Resp:  [16-17] 17  BP: (100-146)/(54-63) 126/54  SpO2:  [94 %-96 %] 96 %  I/O last 3 completed shifts:  In: 1300 [P.O.:200; I.V.:1100]  Out: 1075 [Urine:675; Emesis/NG output:400]  PHYSICAL EXAM  GENERAL: Patient is in no distress. Alert and  oriented.  HEART: Regular rate and rhythm. S1S2.  Bradycardia  LUNGS: Respirations unlabored  ABDOMEN: No distention, no tenderness present.  No guarding or rigidity, sluggish bowel sounds.  NEURO: moving all extremities  EXTREMITIES: No pedal edema.   SKIN: Warm, dry.  PSYCHIATRY Cooperative  )Consultations This Hospital Stay   SURGERY GENERAL IP CONSULT    Time Spent on this Encounter   Marilyn SHEIKH MD, personally saw the patient today and spent greater than 30 minutes discharging this patient.. Discussed with patient, bedside RN.    Discharge Disposition   Discharged to home per patient request   Condition at discharge: Fair    Discharge Orders      Reason for your hospital stay    You were admitted to the hospital with small bowel obstruction, medically managed.  Symptoms improving, plan for discharge with close follow-up.     Activity    Your activity upon discharge: activity as tolerated     Discharge Instructions    On full liquid diet, gradually advance to low fiber diet over the next day.     Follow Up    Monitor hemoglobin, BMP in 7 days  Consider cardiology evaluation as outpatient [Will defer to PCP for referral].    Follow-up with [place] [facility] per schedule.     Monitor and record    Monitor daily blood pressure, heart rate review on provider visit.     Diet    Follow this diet upon discharge: Current Diet:Orders Placed This Encounter      Full Liquid Diet     Hospital Follow-up with Existing Primary Care Provider (PCP)            Discharge Medications   Current Discharge Medication List        START taking these medications    Details   pantoprazole (PROTONIX) 40 MG EC tablet Take 1 tablet (40 mg) by mouth daily.  Qty: 10 tablet, Refills: 0    Associated Diagnoses: Small bowel obstruction (H)      sennosides (SENOKOT) 8.6 MG tablet Take 1 tablet by mouth 2 times daily as needed for constipation.  Qty: 30 tablet, Refills: 0    Associated Diagnoses: Small bowel obstruction (H)            CONTINUE these medications which have NOT CHANGED    Details   clobetasol (TEMOVATE) 0.05 % external ointment Apply topically 2 times daily as needed.      ESTRADIOL 0.1MG/GM CREAM Place vaginally twice a week. Insert 1 gram vaginally 2-3 times per week      Ferrous Sulfate 27 MG TABS Take 1 tablet by mouth daily.      gabapentin (NEURONTIN) 300 MG capsule Take 600 mg by mouth 2 times daily.      multivitamin w/minerals (THERA-VIT-M) tablet Take 1 tablet by mouth daily      omega 3 1000 MG CAPS Take 1 capsule by mouth daily.      polyethylene glycol (MIRALAX) 17 g packet Take 1 packet by mouth daily as needed for constipation.      traZODone (DESYREL) 100 MG tablet Take 150 mg by mouth at bedtime.    Associated Diagnoses: Psychophysiological insomnia      vitamin D3 (CHOLECALCIFEROL) 125 MCG (5000 UT) tablet Take 5,000 Units by mouth daily.           STOP taking these medications       omeprazole (PRILOSEC) 20 MG DR capsule Comments:   Reason for Stopping:             Allergies   Allergies   Allergen Reactions    Ampicillin GI Disturbance       DATA  Most Recent 3 CBC's:  Recent Labs   Lab Test 06/06/25  0846 06/05/25  1131 06/04/25  0015 11/19/24  0709   WBC  --  6.2 10.8 8.1   HGB 12.0 11.5* 13.2 9.8*   MCV 96 94 96 96   PLT  --  170 219 377      Most Recent 3 BMP's:  Recent Labs   Lab Test 06/06/25  0846 06/05/25  1131 06/04/25  0015    145 142   POTASSIUM 3.4 3.8 3.9   CHLORIDE 111* 112* 105   CO2 21* 22 24   BUN 13.6 8.1 14.9   CR 0.58 0.54 0.62   ANIONGAP 10 11 13   CARMEN 8.4* 8.3* 9.2   * 84 113*     Most Recent 2 LFT's:  Recent Labs   Lab Test 06/05/25  1131 06/04/25  0015   AST 19 30   ALT 19 28   ALKPHOS 78 93   BILITOTAL 0.5 0.4       Recent Labs   Lab Test 06/05/25  1131   TSH 1.55     Results for orders placed or performed during the hospital encounter of 06/04/25   CT Abdomen Pelvis w Contrast    Narrative    EXAM: CT ABDOMEN PELVIS W CONTRAST  LOCATION: [facility]  DATE: 6/4/2025    INDICATION: lower abdominal pain, vomiting, history of SBO, feels similar  COMPARISON: 11/14/2024.  TECHNIQUE: CT scan of the abdomen and pelvis was performed following injection of IV contrast. Multiplanar reformats were obtained. Dose reduction techniques were used.  CONTRAST: 66mL Isovue 370    FINDINGS:   LOWER CHEST: Normal.    HEPATOBILIARY: The gallbladder is absent. No significant change in probable scarring in the inferior right lobe.    PANCREAS: Whipple procedure.    SPLEEN: Normal.    ADRENAL GLANDS: Normal.    KIDNEYS/BLADDER: Normal.    BOWEL: There are several dilated small bowel loops in the lower abdomen and pelvis. Transition point is in the anterior pelvis in the region of the surgical anastomosis. Distal small bowel and colon are decompressed. There are colonic diverticula without   acute diverticulitis. No free intraperitoneal gas. Small amount of ascites.    LYMPH NODES: Normal.    VASCULATURE: Normal.    PELVIC ORGANS: The uterus is absent. There is no adnexal mass.    MUSCULOSKELETAL: Lumbosacral fusion.      Impression    IMPRESSION:   1.  Mid small bowel obstruction.  2.  Small amount of ascites.   XR Abdomen Port 1 View    Narrative    EXAM: XR ABDOMEN PORT 1 VIEW  LOCATION: [facility]  DATE: 6/4/2025    INDICATION: NGT placement  COMPARISON: CT abdomen pelvis 6/4/2025.      Impression    IMPRESSION: A gastric drainage tube is not visualized within the field-of-view. Recommend clinical correlation and repeat imaging.    Similar mildly dilated mid abdominal small bowel loops better characterized on the recent CT. Abdominal surgical bowel anastomoses. Spine fusion hardware.   XR Abdomen Port 1 View    Narrative    EXAM: XR ABDOMEN PORT 1 VIEW  LOCATION: [facility]  DATE: 6/4/2025    INDICATION: NGT verification. Small bowel obstruction.  COMPARISON: X-rays and CT earlier 6/4/2025.      Impression    IMPRESSION:  Single view abdomen demonstrates side-port and tip NG tube within stomach. Dilated small bowel loops in the central/lower abdomen appears similar. Colon decompressed. Cholecystectomy clips.   XR Gastrografin Challenge    Narrative    EXAM: XR GASTROGRAFIN CHALLENGE  LOCATION: [facility]  DATE: 6/5/2025    INDICATION: Small Bowel Obstruction  COMPARISON: 425  TECHNIQUE: Routine water soluble contrast follow-through challenge.      Impression    IMPRESSION:  1.  Water soluble contrast material IS identified within the colon 8 hours post administration.  2.  Nasogastric tube terminates over the stomach. No small bowel dilatation. Postoperative changes in the lumbar spine.

## 2025-06-06 NOTE — PROVIDER NOTIFICATION
"MD Notification    Notified Person: MD    Notified Person Name: Pito Mack    Notification Date/Time: 6/6/25. 12:27am    Notification Interaction: vocera web    Purpose of Notification: \"Hi, pt. x-ray results are back, and shows contrast in colon and no bowel obstruction. Should I continue to remove NG tube?\"  12:33 am \"Also, pt. HR is dropping below 50, sustaining between 46-48\"  01:41 am \"Pt. is wondering if she could be on clear liquids and take oral meds\"    Orders Received:  \"I would keep it in until morning unless it is making her miserable, let surgery assess. I don't want to pull it and then have to replace it\"  12:54 am \"got it\"  01:41 am \"I admitted her and I think that is reasonable\" \"clamp her NG\"    Comments:   "

## 2025-06-06 NOTE — PROGRESS NOTES
According to pt tele  Pt resting HR 44  Pt HR with ambulation 97, was 124 HR for less than 1 minute per tele. Few artifact moments of telemetry when pt was fast walking.

## 2025-06-06 NOTE — PLAN OF CARE
Goal Outcome Evaluation:    Pt. A&Ox4. Independent in room. VSS on RA; bradycardic at times 46-48 resting HR per tele. Diet advanced to clear liquids. NG tube removed. Pain managed with oxy, ativan given once. Surgery following.

## 2025-06-06 NOTE — DISCHARGE SUMMARY
Patient A&Ox4. VSS, on RA. Denied Abdominal, CP, SOB. Up Independent. Pt discharging Home with family. AVS printed and instructions were reviewed with Pt. Medications and Copy of AVS handed to Pt. Belongings returned to Pt.

## 2025-06-30 ENCOUNTER — TRANSFERRED RECORDS (OUTPATIENT)
Dept: HEALTH INFORMATION MANAGEMENT | Facility: CLINIC | Age: 70
End: 2025-06-30

## 2025-07-01 ENCOUNTER — TRANSCRIBE ORDERS (OUTPATIENT)
Dept: OTHER | Age: 70
End: 2025-07-01

## 2025-07-01 DIAGNOSIS — R00.1 SYMPTOMATIC BRADYCARDIA: Primary | ICD-10-CM

## 2025-07-02 ENCOUNTER — PATIENT OUTREACH (OUTPATIENT)
Dept: CARE COORDINATION | Facility: CLINIC | Age: 70
End: 2025-07-02
Payer: COMMERCIAL

## 2025-07-05 ENCOUNTER — PATIENT OUTREACH (OUTPATIENT)
Dept: CARE COORDINATION | Facility: CLINIC | Age: 70
End: 2025-07-05
Payer: COMMERCIAL

## 2025-08-17 ENCOUNTER — HEALTH MAINTENANCE LETTER (OUTPATIENT)
Age: 70
End: 2025-08-17

## 2025-08-18 ENCOUNTER — TRANSFERRED RECORDS (OUTPATIENT)
Dept: HEALTH INFORMATION MANAGEMENT | Facility: CLINIC | Age: 70
End: 2025-08-18
Payer: COMMERCIAL

## 2025-08-18 ENCOUNTER — MEDICAL CORRESPONDENCE (OUTPATIENT)
Dept: HEALTH INFORMATION MANAGEMENT | Facility: CLINIC | Age: 70
End: 2025-08-18
Payer: COMMERCIAL

## 2025-08-21 DIAGNOSIS — M81.0 SENILE OSTEOPOROSIS: Primary | ICD-10-CM

## 2025-08-21 RX ORDER — DIPHENHYDRAMINE HYDROCHLORIDE 50 MG/ML
25 INJECTION, SOLUTION INTRAMUSCULAR; INTRAVENOUS
Start: 2025-08-21

## 2025-08-21 RX ORDER — HEPARIN SODIUM,PORCINE 10 UNIT/ML
5-20 VIAL (ML) INTRAVENOUS DAILY PRN
OUTPATIENT
Start: 2025-08-21

## 2025-08-21 RX ORDER — DIPHENHYDRAMINE HYDROCHLORIDE 50 MG/ML
50 INJECTION, SOLUTION INTRAMUSCULAR; INTRAVENOUS
Start: 2025-08-21

## 2025-08-21 RX ORDER — ZOLEDRONIC ACID 0.05 MG/ML
5 INJECTION, SOLUTION INTRAVENOUS ONCE
Start: 2025-08-21

## 2025-08-21 RX ORDER — HEPARIN SODIUM (PORCINE) LOCK FLUSH IV SOLN 100 UNIT/ML 100 UNIT/ML
5 SOLUTION INTRAVENOUS
OUTPATIENT
Start: 2025-08-21

## 2025-08-21 RX ORDER — ALBUTEROL SULFATE 0.83 MG/ML
2.5 SOLUTION RESPIRATORY (INHALATION)
OUTPATIENT
Start: 2025-08-21

## 2025-08-21 RX ORDER — ALBUTEROL SULFATE 90 UG/1
1-2 INHALANT RESPIRATORY (INHALATION)
Start: 2025-08-21

## 2025-08-21 RX ORDER — MEPERIDINE HYDROCHLORIDE 25 MG/ML
25 INJECTION INTRAMUSCULAR; INTRAVENOUS; SUBCUTANEOUS
OUTPATIENT
Start: 2025-08-21

## 2025-08-21 RX ORDER — EPINEPHRINE 1 MG/ML
0.3 INJECTION, SOLUTION INTRAMUSCULAR; SUBCUTANEOUS EVERY 5 MIN PRN
OUTPATIENT
Start: 2025-08-21

## 2025-08-21 RX ORDER — METHYLPREDNISOLONE SODIUM SUCCINATE 40 MG/ML
40 INJECTION INTRAMUSCULAR; INTRAVENOUS
Start: 2025-08-21

## 2025-09-02 ENCOUNTER — EXTERNAL ORDER RESULTS (OUTPATIENT)
Dept: LAB | Facility: CLINIC | Age: 70
End: 2025-09-02
Payer: COMMERCIAL

## 2025-09-02 ENCOUNTER — TRANSFERRED RECORDS (OUTPATIENT)
Dept: HEALTH INFORMATION MANAGEMENT | Facility: CLINIC | Age: 70
End: 2025-09-02
Payer: COMMERCIAL

## 2025-09-04 ENCOUNTER — HOSPITAL ENCOUNTER (OUTPATIENT)
Dept: CARDIOLOGY | Facility: CLINIC | Age: 70
End: 2025-09-04
Attending: FAMILY MEDICINE
Payer: COMMERCIAL

## 2025-09-04 DIAGNOSIS — I35.0 AORTIC STENOSIS: ICD-10-CM

## 2025-09-04 DIAGNOSIS — R00.1 BRADYCARDIA: ICD-10-CM

## 2025-09-04 DIAGNOSIS — I34.0 MITRAL REGURGITATION: ICD-10-CM

## 2025-09-04 DIAGNOSIS — I35.1 AORTIC REGURGITATION: ICD-10-CM

## 2025-09-04 LAB
ALBUMIN (EXTERNAL): 4 G/DL (ref 3.6–5.1)
ALK PHOSPHATASE (EXTERNAL): 71 U/L (ref 37–153)
ALT SERPL-CCNC: 17 U/L (ref 6–29)
AST SERPL-CCNC: 24 U/L (ref 10–35)
BILIRUB SERPL-MCNC: 0.4 MG/DL (ref 0.2–1.2)
BUN SERPL-MCNC: 21 MG/DL (ref 7–25)
CALCIUM (EXTERNAL): 9.1 MG/DL (ref 8.6–10.4)
CHLORIDE (EXTERNAL): 103 MMOL/L (ref 98–110)
CO2 (EXTERNAL): 31 MMOL/L (ref 20–32)
CREATININE (EXTERNAL): 0.74 MG/DL (ref 0.6–1)
GFR ESTIMATED (EXTERNAL): 86 ML/MIN/1.73M2
GLUCOSE (EXTERNAL): 114 MG/DL (ref 65–99)
LVEF ECHO: NORMAL
POTASSIUM (EXTERNAL): 4.3 MMOL/L (ref 3.5–5.3)
PROTEIN TOTAL (EXTERNAL): 6.2 G/DL (ref 6.1–8.1)
SODIUM (EXTERNAL): 140 MMOL/L (ref 135–146)
T4 FREE SERPL-MCNC: 1.2 NG/DL (ref 0.8–1.8)
TSH SERPL-ACNC: 1.18 MIU/L (ref 0.4–4.5)
VITAMIN D DEFICIENCY SCREENING (EXTERNAL): 65 NG/ML (ref 30–100)

## 2025-09-04 PROCEDURE — 93306 TTE W/DOPPLER COMPLETE: CPT

## (undated) DEVICE — ESU PENCIL W/HOLSTER E2350H

## (undated) DEVICE — ADH SKIN CLOSURE PREMIERPRO EXOFIN 1.0ML 3470

## (undated) DEVICE — ESU GROUND PAD ADULT W/CORD E7507

## (undated) DEVICE — SU VICRYL 4-0 PS-2 18" UND J496H

## (undated) DEVICE — PACK MINOR SBA15MIFSE

## (undated) DEVICE — DRAPE LAP TRANSVERSE 29421

## (undated) DEVICE — SOL WATER IRRIG 1000ML BOTTLE 2F7114

## (undated) DEVICE — DECANTER BAG 2002S

## (undated) DEVICE — LINEN GOWN OVERSIZE 5408

## (undated) DEVICE — SU DERMABOND MINI DHVM12

## (undated) DEVICE — SOL NACL 0.9% IRRIG 1000ML BOTTLE 2F7124

## (undated) DEVICE — GLOVE PROTEXIS W/NEU-THERA 7.5  2D73TE75

## (undated) DEVICE — DECANTER VIAL 2006S

## (undated) DEVICE — DRSG GAUZE 4X4" 3033

## (undated) DEVICE — LINEN TOWEL PACK X5 5464

## (undated) DEVICE — ESU ELEC BLADE 2.75" COATED/INSULATED E1455

## (undated) DEVICE — ESU GROUND PAD UNIVERSAL W/O CORD

## (undated) DEVICE — RAD RX ISOVUE 300 (50ML) 61% IOPAMIDOL CHARGE PER ML

## (undated) DEVICE — SYR 10ML SLIP TIP W/O NDL

## (undated) DEVICE — SU VICRYL 3-0 SH 27" J316H

## (undated) DEVICE — GOWN IMPERVIOUS ZONED LG

## (undated) DEVICE — NDL 19GA 1.5"

## (undated) DEVICE — DRAPE SHEET REV FOLD 3/4 9349

## (undated) DEVICE — PREP CHLORAPREP W/ORANGE TINT 10.5ML 260715

## (undated) DEVICE — PREP CHLORAPREP W/ORANGE TINT 10.5ML 930715

## (undated) DEVICE — NDL CANNULA INTERLINK BLUNT 18GA

## (undated) DEVICE — SOL NACL 0.9% INJ 250ML BAG 2B1322Q

## (undated) DEVICE — SUCTION CANISTER MEDIVAC LINER 3000ML W/LID 65651-530

## (undated) RX ORDER — PROPOFOL 10 MG/ML
INJECTION, EMULSION INTRAVENOUS
Status: DISPENSED
Start: 2019-07-20

## (undated) RX ORDER — PROPOFOL 10 MG/ML
INJECTION, EMULSION INTRAVENOUS
Status: DISPENSED
Start: 2020-08-28

## (undated) RX ORDER — FENTANYL CITRATE 50 UG/ML
INJECTION, SOLUTION INTRAMUSCULAR; INTRAVENOUS
Status: DISPENSED
Start: 2019-07-24

## (undated) RX ORDER — CEFAZOLIN SODIUM 2 G/100ML
INJECTION, SOLUTION INTRAVENOUS
Status: DISPENSED
Start: 2019-10-14

## (undated) RX ORDER — ONDANSETRON 2 MG/ML
INJECTION INTRAMUSCULAR; INTRAVENOUS
Status: DISPENSED
Start: 2019-07-24

## (undated) RX ORDER — PROPOFOL 10 MG/ML
INJECTION, EMULSION INTRAVENOUS
Status: DISPENSED
Start: 2019-07-24

## (undated) RX ORDER — DEXAMETHASONE SODIUM PHOSPHATE 4 MG/ML
INJECTION, SOLUTION INTRA-ARTICULAR; INTRALESIONAL; INTRAMUSCULAR; INTRAVENOUS; SOFT TISSUE
Status: DISPENSED
Start: 2019-07-24

## (undated) RX ORDER — LIDOCAINE HYDROCHLORIDE 20 MG/ML
INJECTION, SOLUTION EPIDURAL; INFILTRATION; INTRACAUDAL; PERINEURAL
Status: DISPENSED
Start: 2019-07-24

## (undated) RX ORDER — GLYCOPYRROLATE 0.2 MG/ML
INJECTION, SOLUTION INTRAMUSCULAR; INTRAVENOUS
Status: DISPENSED
Start: 2019-10-14

## (undated) RX ORDER — LIDOCAINE HYDROCHLORIDE 10 MG/ML
INJECTION, SOLUTION INFILTRATION; PERINEURAL
Status: DISPENSED
Start: 2020-08-28

## (undated) RX ORDER — ONDANSETRON 2 MG/ML
INJECTION INTRAMUSCULAR; INTRAVENOUS
Status: DISPENSED
Start: 2020-08-28

## (undated) RX ORDER — LIDOCAINE HYDROCHLORIDE 10 MG/ML
INJECTION, SOLUTION INFILTRATION; PERINEURAL
Status: DISPENSED
Start: 2019-10-14

## (undated) RX ORDER — FENTANYL CITRATE 50 UG/ML
INJECTION, SOLUTION INTRAMUSCULAR; INTRAVENOUS
Status: DISPENSED
Start: 2019-07-20

## (undated) RX ORDER — HEPARIN SODIUM (PORCINE) LOCK FLUSH IV SOLN 100 UNIT/ML 100 UNIT/ML
SOLUTION INTRAVENOUS
Status: DISPENSED
Start: 2019-10-14

## (undated) RX ORDER — HEPARIN SODIUM 1000 [USP'U]/ML
INJECTION, SOLUTION INTRAVENOUS; SUBCUTANEOUS
Status: DISPENSED
Start: 2019-10-14

## (undated) RX ORDER — LIDOCAINE HYDROCHLORIDE 20 MG/ML
INJECTION, SOLUTION EPIDURAL; INFILTRATION; INTRACAUDAL; PERINEURAL
Status: DISPENSED
Start: 2019-10-14

## (undated) RX ORDER — CEFAZOLIN SODIUM 1 G/3ML
INJECTION, POWDER, FOR SOLUTION INTRAMUSCULAR; INTRAVENOUS
Status: DISPENSED
Start: 2020-08-28

## (undated) RX ORDER — FENTANYL CITRATE 50 UG/ML
INJECTION, SOLUTION INTRAMUSCULAR; INTRAVENOUS
Status: DISPENSED
Start: 2019-10-14

## (undated) RX ORDER — ONDANSETRON 2 MG/ML
INJECTION INTRAMUSCULAR; INTRAVENOUS
Status: DISPENSED
Start: 2019-10-14

## (undated) RX ORDER — FENTANYL CITRATE 50 UG/ML
INJECTION, SOLUTION INTRAMUSCULAR; INTRAVENOUS
Status: DISPENSED
Start: 2020-08-28